# Patient Record
Sex: MALE | Race: WHITE | Employment: UNEMPLOYED | URBAN - METROPOLITAN AREA
[De-identification: names, ages, dates, MRNs, and addresses within clinical notes are randomized per-mention and may not be internally consistent; named-entity substitution may affect disease eponyms.]

---

## 2017-05-03 PROBLEM — R55 SYNCOPE, NEAR: Status: ACTIVE | Noted: 2017-05-03

## 2017-05-03 PROBLEM — F10.10 ALCOHOL ABUSE: Status: ACTIVE | Noted: 2017-05-03

## 2017-05-03 PROBLEM — N17.9 AKI (ACUTE KIDNEY INJURY) (HCC): Status: ACTIVE | Noted: 2017-05-03

## 2017-05-09 ENCOUNTER — TELEPHONE (OUTPATIENT)
Dept: FAMILY MEDICINE CLINIC | Age: 47
End: 2017-05-09

## 2017-05-10 ENCOUNTER — OFFICE VISIT (OUTPATIENT)
Dept: FAMILY MEDICINE CLINIC | Age: 47
End: 2017-05-10

## 2017-05-10 VITALS
SYSTOLIC BLOOD PRESSURE: 110 MMHG | HEART RATE: 100 BPM | WEIGHT: 182.2 LBS | DIASTOLIC BLOOD PRESSURE: 80 MMHG | BODY MASS INDEX: 23.38 KG/M2 | HEIGHT: 74 IN

## 2017-05-10 DIAGNOSIS — Z09 HOSPITAL DISCHARGE FOLLOW-UP: Primary | ICD-10-CM

## 2017-05-10 DIAGNOSIS — B35.1 ONYCHOMYCOSIS OF LEFT GREAT TOE: ICD-10-CM

## 2017-05-10 DIAGNOSIS — F41.9 ANXIETY: ICD-10-CM

## 2017-05-10 DIAGNOSIS — R55 NEAR SYNCOPE: ICD-10-CM

## 2017-05-10 DIAGNOSIS — R79.89 ELEVATED LFTS: ICD-10-CM

## 2017-05-10 DIAGNOSIS — N17.9 ACUTE KIDNEY INJURY (HCC): ICD-10-CM

## 2017-05-10 DIAGNOSIS — F10.10 ALCOHOL ABUSE: ICD-10-CM

## 2017-05-10 DIAGNOSIS — J44.9 CHRONIC OBSTRUCTIVE PULMONARY DISEASE, UNSPECIFIED COPD TYPE (HCC): ICD-10-CM

## 2017-05-10 DIAGNOSIS — I10 ESSENTIAL HYPERTENSION: ICD-10-CM

## 2017-05-10 PROCEDURE — 99214 OFFICE O/P EST MOD 30 MIN: CPT | Performed by: FAMILY MEDICINE

## 2017-05-10 RX ORDER — HYDROXYZINE PAMOATE 25 MG/1
25 CAPSULE ORAL 2 TIMES DAILY PRN
Qty: 60 CAPSULE | Refills: 5 | Status: SHIPPED | OUTPATIENT
Start: 2017-05-10 | End: 2017-10-06 | Stop reason: SDUPTHER

## 2017-05-10 RX ORDER — LISINOPRIL 10 MG/1
10 TABLET ORAL DAILY
Qty: 30 TABLET | Refills: 5 | Status: SHIPPED | OUTPATIENT
Start: 2017-05-10 | End: 2017-10-06 | Stop reason: SDUPTHER

## 2017-05-10 RX ORDER — PAROXETINE HYDROCHLORIDE 20 MG/1
20 TABLET, FILM COATED ORAL DAILY
Qty: 30 TABLET | Refills: 5 | Status: SHIPPED | OUTPATIENT
Start: 2017-05-10 | End: 2017-10-06 | Stop reason: SDUPTHER

## 2017-05-10 RX ORDER — FAMOTIDINE 40 MG/1
20 TABLET, FILM COATED ORAL 2 TIMES DAILY
Qty: 60 TABLET | Refills: 5 | Status: SHIPPED | OUTPATIENT
Start: 2017-05-10 | End: 2017-10-06 | Stop reason: SDUPTHER

## 2017-05-10 RX ORDER — NALTREXONE HYDROCHLORIDE 50 MG/1
50 TABLET, FILM COATED ORAL DAILY
Qty: 30 TABLET | Refills: 0 | Status: SHIPPED | OUTPATIENT
Start: 2017-05-10 | End: 2017-06-05 | Stop reason: SDUPTHER

## 2017-05-10 ASSESSMENT — PATIENT HEALTH QUESTIONNAIRE - PHQ9
SUM OF ALL RESPONSES TO PHQ9 QUESTIONS 1 & 2: 0
1. LITTLE INTEREST OR PLEASURE IN DOING THINGS: 0
SUM OF ALL RESPONSES TO PHQ QUESTIONS 1-9: 0
2. FEELING DOWN, DEPRESSED OR HOPELESS: 0

## 2017-06-05 ENCOUNTER — TELEPHONE (OUTPATIENT)
Dept: FAMILY MEDICINE CLINIC | Age: 47
End: 2017-06-05

## 2017-06-05 ENCOUNTER — OFFICE VISIT (OUTPATIENT)
Dept: FAMILY MEDICINE CLINIC | Age: 47
End: 2017-06-05

## 2017-06-05 VITALS
BODY MASS INDEX: 22.72 KG/M2 | HEIGHT: 74 IN | WEIGHT: 177 LBS | SYSTOLIC BLOOD PRESSURE: 136 MMHG | DIASTOLIC BLOOD PRESSURE: 80 MMHG | HEART RATE: 86 BPM

## 2017-06-05 DIAGNOSIS — F10.10 ALCOHOL ABUSE: ICD-10-CM

## 2017-06-05 PROCEDURE — 99213 OFFICE O/P EST LOW 20 MIN: CPT | Performed by: FAMILY MEDICINE

## 2017-06-05 RX ORDER — NALTREXONE HYDROCHLORIDE 50 MG/1
50 TABLET, FILM COATED ORAL DAILY
Qty: 30 TABLET | Refills: 1 | Status: SHIPPED | OUTPATIENT
Start: 2017-06-05 | End: 2017-10-06

## 2017-06-05 ASSESSMENT — ENCOUNTER SYMPTOMS
VOMITING: 1
NAUSEA: 1

## 2017-06-15 DIAGNOSIS — F41.9 ANXIETY: ICD-10-CM

## 2017-06-15 RX ORDER — TRAZODONE HYDROCHLORIDE 100 MG/1
100 TABLET ORAL NIGHTLY
Qty: 30 TABLET | Refills: 1 | Status: SHIPPED | OUTPATIENT
Start: 2017-06-15 | End: 2018-10-25

## 2017-10-06 ENCOUNTER — TELEPHONE (OUTPATIENT)
Dept: FAMILY MEDICINE CLINIC | Age: 47
End: 2017-10-06

## 2017-10-06 ENCOUNTER — OFFICE VISIT (OUTPATIENT)
Dept: FAMILY MEDICINE CLINIC | Age: 47
End: 2017-10-06

## 2017-10-06 VITALS
WEIGHT: 181.4 LBS | DIASTOLIC BLOOD PRESSURE: 90 MMHG | SYSTOLIC BLOOD PRESSURE: 130 MMHG | BODY MASS INDEX: 23.28 KG/M2 | HEIGHT: 74 IN | HEART RATE: 100 BPM | TEMPERATURE: 98.1 F

## 2017-10-06 DIAGNOSIS — F51.01 PRIMARY INSOMNIA: ICD-10-CM

## 2017-10-06 DIAGNOSIS — F41.9 ANXIETY: ICD-10-CM

## 2017-10-06 DIAGNOSIS — Z23 NEED FOR INFLUENZA VACCINATION: ICD-10-CM

## 2017-10-06 DIAGNOSIS — F10.10 ALCOHOL ABUSE: ICD-10-CM

## 2017-10-06 DIAGNOSIS — J44.9 CHRONIC OBSTRUCTIVE PULMONARY DISEASE, UNSPECIFIED COPD TYPE (HCC): ICD-10-CM

## 2017-10-06 DIAGNOSIS — I10 ESSENTIAL HYPERTENSION: Primary | ICD-10-CM

## 2017-10-06 PROCEDURE — 90471 IMMUNIZATION ADMIN: CPT | Performed by: FAMILY MEDICINE

## 2017-10-06 PROCEDURE — 99214 OFFICE O/P EST MOD 30 MIN: CPT | Performed by: FAMILY MEDICINE

## 2017-10-06 PROCEDURE — 90686 IIV4 VACC NO PRSV 0.5 ML IM: CPT | Performed by: FAMILY MEDICINE

## 2017-10-06 RX ORDER — HYDROXYZINE PAMOATE 25 MG/1
25 CAPSULE ORAL 2 TIMES DAILY PRN
Qty: 60 CAPSULE | Refills: 5 | Status: SHIPPED | OUTPATIENT
Start: 2017-10-06 | End: 2018-05-10 | Stop reason: SDUPTHER

## 2017-10-06 RX ORDER — FAMOTIDINE 40 MG/1
20 TABLET, FILM COATED ORAL 2 TIMES DAILY
Qty: 60 TABLET | Refills: 5 | Status: SHIPPED | OUTPATIENT
Start: 2017-10-06 | End: 2018-05-10 | Stop reason: SDUPTHER

## 2017-10-06 RX ORDER — PAROXETINE HYDROCHLORIDE 20 MG/1
20 TABLET, FILM COATED ORAL DAILY
Qty: 30 TABLET | Refills: 5 | Status: SHIPPED | OUTPATIENT
Start: 2017-10-06 | End: 2018-05-10 | Stop reason: SDUPTHER

## 2017-10-06 RX ORDER — LISINOPRIL 10 MG/1
10 TABLET ORAL DAILY
Qty: 30 TABLET | Refills: 5 | Status: SHIPPED | OUTPATIENT
Start: 2017-10-06 | End: 2018-05-10 | Stop reason: SDUPTHER

## 2017-10-06 RX ORDER — TRAZODONE HYDROCHLORIDE 50 MG/1
50 TABLET ORAL NIGHTLY
Qty: 30 TABLET | Refills: 5 | Status: SHIPPED | OUTPATIENT
Start: 2017-10-06 | End: 2018-05-10 | Stop reason: SDUPTHER

## 2017-10-06 RX ORDER — ALBUTEROL SULFATE 90 UG/1
2 AEROSOL, METERED RESPIRATORY (INHALATION) EVERY 6 HOURS PRN
Qty: 1 INHALER | Refills: 5 | Status: SHIPPED | OUTPATIENT
Start: 2017-10-06 | End: 2018-05-10 | Stop reason: SDUPTHER

## 2017-10-06 RX ORDER — NALTREXONE HYDROCHLORIDE 50 MG/1
50 TABLET, FILM COATED ORAL DAILY
Qty: 30 TABLET | Refills: 1 | Status: SHIPPED | OUTPATIENT
Start: 2017-10-06 | End: 2017-10-06 | Stop reason: CLARIF

## 2017-10-06 ASSESSMENT — ENCOUNTER SYMPTOMS: SHORTNESS OF BREATH: 1

## 2017-10-06 ASSESSMENT — COPD QUESTIONNAIRES: COPD: 1

## 2017-10-06 NOTE — MR AVS SNAPSHOT
After Visit Summary             Jose M Saldaña   10/6/2017 11:00 AM   Office Visit    Description:  Male : 1970   Provider:  Marycruz Man MD   Department[facility]  [facility]              Your Follow-Up and Future Appointments         Below is a list of your follow-up and future appointments. This may not be a complete list as you may have made appointments directly with providers that we are not aware of or your providers may have made some for you. Please call your providers to confirm appointments. It is important to keep your appointments. Please bring your current insurance card, photo ID, co-pay, and all medication bottles to your appointment. If self-pay, payment is expected at the time of service. Your To-Do List     Future Appointments Provider Department Dept Phone    3/29/2018 10:00 AM Marycruz Man MD [address] [place] [address] [phone]    Please arrive 15 minutes prior to appointment, bring photo ID and insurance card. Follow-Up    Return in about 25 weeks (around 3/30/2018) for Insomnia, Anxiety. Information from Your Visit        Department     Name Address Phone Fax    [address] [phone] [address] [place] [address] Were Seen for:         Comments    Essential hypertension   [[id number]]         Vital Signs     Blood Pressure Pulse Temperature Height Weight Body Mass Index    130/90 100 98.1 °F (36.7 °C) 6' 2\" (1.88 m) 181 lb 6.4 oz (82.3 kg) 23.29 kg/m2    Smoking Status                   Former Smoker              Today's Medication Changes          These changes are accurate as of: 10/6/17 11:37 AM.  If you have any questions, ask your nurse or doctor. CHANGE how you take these medications           * traZODone 100 MG tablet   Commonly known as:  DESYREL   Instructions: Take 100 mg by mouth nightly   Refills:  0   What changed:  Another medication with the same name was added.  Make sure you understand how and when to take each. Changed by:  Thamas Pallas, MD       * traZODone 100 MG tablet   Commonly known as:  DESYREL   Instructions: Take 1 tablet by mouth nightly   Quantity:  30 tablet   Refills:  1   What changed:  Another medication with the same name was added. Make sure you understand how and when to take each. Changed by:  Michelle Fried MD       * traZODone 50 MG tablet   Commonly known as:  DESYREL   Instructions: Take 1 tablet by mouth nightly   Quantity:  30 tablet   Refills:  5   What changed: You were already taking a medication with the same name, and this prescription was added. Make sure you understand how and when to take each. Changed by:  Michelle Fried MD       * Notice: This list has 3 medication(s) that are the same as other medications prescribed for you. Read the directions carefully, and ask your doctor or other care provider to review them with you.          Where to Get Your Medications      These medications were sent to [facility] [address] [place] [address]     Phone:  [phone]     albuterol sulfate  (90 Base) MCG/ACT inhaler    famotidine 40 MG tablet    hydrOXYzine 25 MG capsule    lisinopril 10 MG tablet    naltrexone 50 MG tablet    PARoxetine 20 MG tablet    tiotropium 18 MCG inhalation capsule    traZODone 50 MG tablet               Your Current Medications Are              naltrexone (DEPADE) 50 MG tablet Take 1 tablet by mouth daily    lisinopril (PRINIVIL;ZESTRIL) 10 MG tablet Take 1 tablet by mouth daily    tiotropium (SPIRIVA HANDIHALER) 18 MCG inhalation capsule Inhale 1 capsule into the lungs daily    famotidine (PEPCID) 40 MG tablet Take 0.5 tablets by mouth 2 times daily    PARoxetine (PAXIL) 20 MG tablet Take 1 tablet by mouth daily    albuterol sulfate HFA (PROVENTIL HFA) 108 (90 Base) MCG/ACT inhaler

## 2017-10-06 NOTE — TELEPHONE ENCOUNTER
Please let patient know that I sent naltrexone to his pharmacy on Friday. This was an error. I call the pharmacy and canceled it. However the naltrexone may show up on his after visit summary. Please be assured he is not taking this medication.

## 2017-10-06 NOTE — PROGRESS NOTES
Subjective:      Patient ID: Ajith Mercedes is a 50 y.o. male. Hypertension   This is a chronic problem. The current episode started 1 to 4 weeks ago. The problem has been waxing and waning since onset. The problem is uncontrolled (Stopped his lisinopril). Associated symptoms include shortness of breath. Past treatments include ACE inhibitors. The current treatment provides significant improvement. There are no compliance problems. COPD   He complains of shortness of breath. Primary symptoms comments: Has been taking the Spiriva only when he sweated of breath. Has been taking his puffer daily. . This is a chronic problem. The problem occurs every several days. The problem has been unchanged. Mental Health Problem   Primary symptoms comment: Anxiety doing okay. He did stop the Paxil, but realizes he should be taking. He promises to restart it. . This is a chronic problem. Insomnia: Takes the trazodone for this. The trazodone was making his mouth very dry at night. So he would take it sporadically. Now only takes it if he reallycannot have insomnia problems. Alcohol abuse: He has not had a drink in several weeks. Has stopped the naltrexone. Patient Active Problem List   Diagnosis    Panic attack    Anxiety    Abnormal LFTs    Steatohepatitis    Chronic obstructive pulmonary disease (HCC)    Cramps of lower extremity    Alcoholic hepatitis without ascites    Essential hypertension    Simple chronic bronchitis (HCC)    BETTY [facility]acute kidney injury) ([facility] [address]    Syncope, near    Alcohol abuse     Past Surgical History:   Procedure Laterality Date    FOOT SURGERY      right foot tendon release as a baby    TONSILLECTOMY           Review of Systems   Respiratory: Positive for shortness of breath. Cardiovascular: Negative. Endocrine: Positive for polydipsia (only when takes trazodone). Polyuria: only when takes trazodone. Psychiatric/Behavioral: Positive for sleep disturbance.  Negative for behavioral problems. Objective:   Physical Exam   Constitutional: He appears well-developed and well-nourished. HENT:   Head: Normocephalic and atraumatic. Cardiovascular: Normal rate, regular rhythm and normal heart sounds. Pulmonary/Chest: Effort normal and breath sounds normal.   Abdominal: Soft. He exhibits no mass. There is no tenderness. Psychiatric: He has a normal mood and affect.  His behavior is normal. Judgment and thought content normal.     Lab Review   Admission on 05/03/2017, Discharged on 05/05/2017   Component Date Value    Sodium 05/03/2017 136     Potassium 05/03/2017 4.0     Chloride 05/03/2017 88*    CO2 05/03/2017 23     BUN 05/03/2017 13     CREATININE 05/03/2017 1.5*    Glucose 05/03/2017 157*    Calcium 05/03/2017 8.8     Alb 05/03/2017 4.7     Total Protein 05/03/2017 7.3     Total Bilirubin 05/03/2017 1.6*    ALT 05/03/2017 106*    AST 05/03/2017 145*    Alkaline Phosphatase 05/03/2017 84     GFR Non- 05/03/2017 50*    GFR  05/03/2017 >60     Anion Gap 05/03/2017 25*    Color, UA 05/03/2017 DARK YELLOW*    Clarity, UA 05/03/2017 HAZY*    Glucose, Urine 05/03/2017 50*    Bilirubin Urine 05/03/2017 NEGATIVE     Ketones, Urine 05/03/2017 MODERATE*    Specific Gravity, UA 05/03/2017 1.016     Blood, Urine 05/03/2017 SMALL*    pH, Urine 05/03/2017 5.0     Protein, UA 05/03/2017 100*    Urobilinogen, Urine 05/03/2017 NORMAL     Nitrite Urine, Quantitat* 05/03/2017 NEGATIVE     Leukocyte Esterase, Urine 05/03/2017 NEGATIVE     RBC, UA 05/03/2017 1     WBC, UA 05/03/2017 1     Bacteria, UA 05/03/2017 RARE*    Mucus, UA 05/03/2017 MODERATE*    Hyaline Casts, UA 05/03/2017 >20     Granular Casts, UA 05/03/2017 9     Ventricular Rate 05/03/2017 85     Atrial Rate 05/03/2017 85     P-R Interval 05/03/2017 114     QRS Duration 05/03/2017 86     Q-T Interval 05/03/2017 396     QTc Calculation (Bazett) 05/03/2017 471     P Axis 05/03/2017 -15     R Axis 05/03/2017 72     T Axis 05/03/2017 78     Diagnosis 05/03/2017                      Value:Normal sinus rhythm  Normal ECG  No previous ECGs available  Confirmed by DAPHNEY SILVEIRA MD ([id number]) on 5/4/2017 9:23:08 AM      Lipase 05/03/2017 77*    Sodium, Ur 05/03/2017 45     Creatinine, Ur 05/03/2017 297.2     Osmolality, Ur 05/03/2017 470     Amylase 05/04/2017 76     WBC 05/04/2017 6.5     RBC 05/04/2017 4.12*    Hemoglobin 05/04/2017 13.1*    Hematocrit 05/04/2017 39.7*    MCV 05/04/2017 96.4     MCH 05/04/2017 31.8*    MCHC 05/04/2017 33.0     RDW 05/04/2017 14.5     Platelets 91/12/5023 218     MPV 05/04/2017 9.7     Differential Type 05/04/2017 AUTOMATED DIFFERENTIAL     Segs Relative 05/04/2017 71.0*    Lymphocytes % 05/04/2017 17.9*    Monocytes % 05/04/2017 8.4*    Eosinophils % 05/04/2017 1.4     Basophils % 05/04/2017 0.8     Segs Absolute 05/04/2017 4.7     Lymphocytes # 05/04/2017 1.2     Monocytes # 05/04/2017 0.6     Eosinophils # 05/04/2017 0.1     Basophils # 05/04/2017 0.1     Nucleated RBC % 05/04/2017 0.0     Total Nucleated RBC 05/04/2017 0.0     Total Immature Neutrophil 05/04/2017 0.03     Immature Neutrophil % 05/04/2017 0.5*    Sodium 05/04/2017 134*    Potassium 05/04/2017 4.4     Chloride 05/04/2017 94*    CO2 05/04/2017 26     BUN 05/04/2017 13     CREATININE 05/04/2017 0.9     Glucose 05/04/2017 121     Calcium 05/04/2017 8.5     Alb 05/04/2017 3.9     Total Protein 05/04/2017 5.6*    Total Bilirubin 05/04/2017 2.1*    ALT 05/04/2017 74*    AST 05/04/2017 103*    Alkaline Phosphatase 05/04/2017 81     GFR Non- 05/04/2017 >60     GFR  05/04/2017 >60     Anion Gap 05/04/2017 14     Magnesium 05/04/2017 1.5*    Phosphorus 05/04/2017 2.2*    TSH, High Sensitivity 05/04/2017 1.190        Assessment:      1.  Essential hypertension  lisinopril (PRINIVIL;ZESTRIL) 10 MG

## 2017-10-10 ENCOUNTER — TELEPHONE (OUTPATIENT)
Dept: FAMILY MEDICINE CLINIC | Age: 47
End: 2017-10-10

## 2017-10-10 NOTE — TELEPHONE ENCOUNTER
Received denial on Spiriva handihaler it's covered for patients under 23 year of age, called insurance and Spiriva Respimat is covered. Please send.

## 2018-05-10 ENCOUNTER — TELEPHONE (OUTPATIENT)
Dept: FAMILY MEDICINE CLINIC | Age: 48
End: 2018-05-10

## 2018-05-10 ENCOUNTER — OFFICE VISIT (OUTPATIENT)
Dept: FAMILY MEDICINE CLINIC | Age: 48
End: 2018-05-10

## 2018-05-10 VITALS
DIASTOLIC BLOOD PRESSURE: 100 MMHG | SYSTOLIC BLOOD PRESSURE: 150 MMHG | HEIGHT: 76 IN | BODY MASS INDEX: 20.38 KG/M2 | HEART RATE: 116 BPM | WEIGHT: 167.4 LBS

## 2018-05-10 DIAGNOSIS — S76.311A STRAIN OF RIGHT HAMSTRING: ICD-10-CM

## 2018-05-10 DIAGNOSIS — J44.9 CHRONIC OBSTRUCTIVE PULMONARY DISEASE, UNSPECIFIED COPD TYPE (HCC): ICD-10-CM

## 2018-05-10 DIAGNOSIS — I10 ESSENTIAL HYPERTENSION: Primary | ICD-10-CM

## 2018-05-10 DIAGNOSIS — F51.01 PRIMARY INSOMNIA: ICD-10-CM

## 2018-05-10 DIAGNOSIS — S86.111A GASTROCNEMIUS STRAIN, RIGHT, INITIAL ENCOUNTER: ICD-10-CM

## 2018-05-10 DIAGNOSIS — F41.9 ANXIETY: ICD-10-CM

## 2018-05-10 DIAGNOSIS — S89.91XD INJURY OF RIGHT KNEE, SUBSEQUENT ENCOUNTER: ICD-10-CM

## 2018-05-10 PROCEDURE — 99214 OFFICE O/P EST MOD 30 MIN: CPT | Performed by: FAMILY MEDICINE

## 2018-05-10 PROCEDURE — G8420 CALC BMI NORM PARAMETERS: HCPCS | Performed by: FAMILY MEDICINE

## 2018-05-10 PROCEDURE — 3023F SPIROM DOC REV: CPT | Performed by: FAMILY MEDICINE

## 2018-05-10 PROCEDURE — G8427 DOCREV CUR MEDS BY ELIG CLIN: HCPCS | Performed by: FAMILY MEDICINE

## 2018-05-10 PROCEDURE — 1036F TOBACCO NON-USER: CPT | Performed by: FAMILY MEDICINE

## 2018-05-10 PROCEDURE — G8926 SPIRO NO PERF OR DOC: HCPCS | Performed by: FAMILY MEDICINE

## 2018-05-10 RX ORDER — ALBUTEROL SULFATE 90 UG/1
2 AEROSOL, METERED RESPIRATORY (INHALATION) EVERY 6 HOURS PRN
Qty: 1 INHALER | Refills: 0 | Status: SHIPPED | OUTPATIENT
Start: 2018-05-10 | End: 2018-06-29 | Stop reason: SDUPTHER

## 2018-05-10 RX ORDER — TRAZODONE HYDROCHLORIDE 50 MG/1
50 TABLET ORAL NIGHTLY
Qty: 30 TABLET | Refills: 0 | Status: SHIPPED | OUTPATIENT
Start: 2018-05-10 | End: 2018-06-29

## 2018-05-10 RX ORDER — LISINOPRIL 10 MG/1
10 TABLET ORAL DAILY
Qty: 30 TABLET | Refills: 0 | Status: SHIPPED | OUTPATIENT
Start: 2018-05-10 | End: 2018-06-29

## 2018-05-10 RX ORDER — FAMOTIDINE 40 MG/1
20 TABLET, FILM COATED ORAL 2 TIMES DAILY
Qty: 60 TABLET | Refills: 0 | Status: SHIPPED | OUTPATIENT
Start: 2018-05-10 | End: 2018-06-29 | Stop reason: SDUPTHER

## 2018-05-10 RX ORDER — PAROXETINE HYDROCHLORIDE 20 MG/1
20 TABLET, FILM COATED ORAL DAILY
Qty: 30 TABLET | Refills: 0 | Status: SHIPPED | OUTPATIENT
Start: 2018-05-10 | End: 2018-06-29 | Stop reason: SDUPTHER

## 2018-05-10 RX ORDER — HYDROXYZINE PAMOATE 25 MG/1
25 CAPSULE ORAL 2 TIMES DAILY PRN
Qty: 60 CAPSULE | Refills: 0 | Status: SHIPPED | OUTPATIENT
Start: 2018-05-10 | End: 2018-06-29 | Stop reason: SDUPTHER

## 2018-05-10 ASSESSMENT — ENCOUNTER SYMPTOMS
RESPIRATORY NEGATIVE: 1
GASTROINTESTINAL NEGATIVE: 1

## 2018-06-21 ENCOUNTER — OFFICE VISIT (OUTPATIENT)
Dept: ORTHOPEDIC SURGERY | Age: 48
End: 2018-06-21

## 2018-06-21 VITALS — RESPIRATION RATE: 16 BRPM | WEIGHT: 167 LBS | BODY MASS INDEX: 20.76 KG/M2 | HEIGHT: 75 IN

## 2018-06-21 DIAGNOSIS — R52 PAIN: ICD-10-CM

## 2018-06-21 DIAGNOSIS — M54.10 RADICULOPATHY, UNSPECIFIED SPINAL REGION: ICD-10-CM

## 2018-06-21 DIAGNOSIS — S86.111A GASTROCNEMIUS STRAIN, RIGHT, INITIAL ENCOUNTER: Primary | ICD-10-CM

## 2018-06-21 PROCEDURE — G8427 DOCREV CUR MEDS BY ELIG CLIN: HCPCS | Performed by: ORTHOPAEDIC SURGERY

## 2018-06-21 PROCEDURE — 3017F COLORECTAL CA SCREEN DOC REV: CPT | Performed by: ORTHOPAEDIC SURGERY

## 2018-06-21 PROCEDURE — 99244 OFF/OP CNSLTJ NEW/EST MOD 40: CPT | Performed by: ORTHOPAEDIC SURGERY

## 2018-06-21 PROCEDURE — G8420 CALC BMI NORM PARAMETERS: HCPCS | Performed by: ORTHOPAEDIC SURGERY

## 2018-06-21 ASSESSMENT — ENCOUNTER SYMPTOMS
RESPIRATORY NEGATIVE: 1
GASTROINTESTINAL NEGATIVE: 1
EYES NEGATIVE: 1

## 2018-06-25 ENCOUNTER — TELEPHONE (OUTPATIENT)
Dept: FAMILY MEDICINE CLINIC | Age: 48
End: 2018-06-25

## 2018-06-25 NOTE — TELEPHONE ENCOUNTER
I tried to call patient, no answer. [facility]. Please see window for papers.

## 2018-06-28 NOTE — TELEPHONE ENCOUNTER
Patient is filling paroxetine and lisinopril at [facility] the non-adherence is from [facility].

## 2018-06-29 ENCOUNTER — OFFICE VISIT (OUTPATIENT)
Dept: FAMILY MEDICINE CLINIC | Age: 48
End: 2018-06-29

## 2018-06-29 VITALS
WEIGHT: 165 LBS | HEART RATE: 98 BPM | SYSTOLIC BLOOD PRESSURE: 162 MMHG | BODY MASS INDEX: 20.62 KG/M2 | DIASTOLIC BLOOD PRESSURE: 92 MMHG

## 2018-06-29 DIAGNOSIS — R20.2 BILATERAL NUMBNESS AND TINGLING OF ARMS AND LEGS: ICD-10-CM

## 2018-06-29 DIAGNOSIS — I10 ESSENTIAL HYPERTENSION: Primary | ICD-10-CM

## 2018-06-29 DIAGNOSIS — G89.29 CHRONIC PAIN OF RIGHT KNEE: ICD-10-CM

## 2018-06-29 DIAGNOSIS — R20.0 BILATERAL NUMBNESS AND TINGLING OF ARMS AND LEGS: ICD-10-CM

## 2018-06-29 DIAGNOSIS — K70.10 ALCOHOLIC HEPATITIS WITHOUT ASCITES: ICD-10-CM

## 2018-06-29 DIAGNOSIS — M25.561 CHRONIC PAIN OF RIGHT KNEE: ICD-10-CM

## 2018-06-29 DIAGNOSIS — J44.9 CHRONIC OBSTRUCTIVE PULMONARY DISEASE, UNSPECIFIED COPD TYPE (HCC): ICD-10-CM

## 2018-06-29 DIAGNOSIS — F41.9 ANXIETY: ICD-10-CM

## 2018-06-29 DIAGNOSIS — Z13.1 SCREENING FOR DIABETES MELLITUS: ICD-10-CM

## 2018-06-29 LAB
ANION GAP SERPL CALCULATED.3IONS-SCNC: 20 MMOL/L (ref 3–16)
BUN BLDV-MCNC: 13 MG/DL (ref 7–20)
CALCIUM SERPL-MCNC: 9.5 MG/DL (ref 8.3–10.6)
CHLORIDE BLD-SCNC: 95 MMOL/L (ref 99–110)
CHOLESTEROL, TOTAL: 201 MG/DL (ref 0–199)
CO2: 25 MMOL/L (ref 21–32)
CREAT SERPL-MCNC: 0.6 MG/DL (ref 0.9–1.3)
FOLATE: 11.48 NG/ML (ref 4.78–24.2)
GFR AFRICAN AMERICAN: >60
GFR NON-AFRICAN AMERICAN: >60
GLUCOSE BLD-MCNC: 104 MG/DL (ref 70–99)
HDLC SERPL-MCNC: 107 MG/DL (ref 40–60)
LDL CHOLESTEROL CALCULATED: 83 MG/DL
POTASSIUM SERPL-SCNC: 4.1 MMOL/L (ref 3.5–5.1)
SODIUM BLD-SCNC: 140 MMOL/L (ref 136–145)
TRIGL SERPL-MCNC: 53 MG/DL (ref 0–150)
VITAMIN B-12: 375 PG/ML (ref 211–911)
VLDLC SERPL CALC-MCNC: 11 MG/DL

## 2018-06-29 PROCEDURE — G8926 SPIRO NO PERF OR DOC: HCPCS | Performed by: FAMILY MEDICINE

## 2018-06-29 PROCEDURE — 1036F TOBACCO NON-USER: CPT | Performed by: FAMILY MEDICINE

## 2018-06-29 PROCEDURE — 3023F SPIROM DOC REV: CPT | Performed by: FAMILY MEDICINE

## 2018-06-29 PROCEDURE — 3017F COLORECTAL CA SCREEN DOC REV: CPT | Performed by: FAMILY MEDICINE

## 2018-06-29 PROCEDURE — 99214 OFFICE O/P EST MOD 30 MIN: CPT | Performed by: FAMILY MEDICINE

## 2018-06-29 PROCEDURE — G8420 CALC BMI NORM PARAMETERS: HCPCS | Performed by: FAMILY MEDICINE

## 2018-06-29 PROCEDURE — 36415 COLL VENOUS BLD VENIPUNCTURE: CPT | Performed by: FAMILY MEDICINE

## 2018-06-29 PROCEDURE — G8427 DOCREV CUR MEDS BY ELIG CLIN: HCPCS | Performed by: FAMILY MEDICINE

## 2018-06-29 RX ORDER — LISINOPRIL 30 MG/1
30 TABLET ORAL DAILY
Qty: 30 TABLET | Refills: 1 | Status: SHIPPED | OUTPATIENT
Start: 2018-06-29 | End: 2018-10-25 | Stop reason: SDUPTHER

## 2018-06-29 RX ORDER — PAROXETINE HYDROCHLORIDE 20 MG/1
20 TABLET, FILM COATED ORAL DAILY
Qty: 30 TABLET | Refills: 1 | Status: SHIPPED | OUTPATIENT
Start: 2018-06-29 | End: 2018-10-25 | Stop reason: SDUPTHER

## 2018-06-29 RX ORDER — LISINOPRIL 30 MG/1
30 TABLET ORAL DAILY
Qty: 30 TABLET | Refills: 0 | Status: SHIPPED | OUTPATIENT
Start: 2018-06-29 | End: 2018-06-29 | Stop reason: SDUPTHER

## 2018-06-29 RX ORDER — HYDROXYZINE PAMOATE 25 MG/1
25 CAPSULE ORAL 2 TIMES DAILY PRN
Qty: 60 CAPSULE | Refills: 1 | Status: SHIPPED | OUTPATIENT
Start: 2018-06-29 | End: 2018-10-25 | Stop reason: SDUPTHER

## 2018-06-29 RX ORDER — ALBUTEROL SULFATE 90 UG/1
2 AEROSOL, METERED RESPIRATORY (INHALATION) EVERY 6 HOURS PRN
Qty: 1 INHALER | Refills: 1 | Status: SHIPPED | OUTPATIENT
Start: 2018-06-29 | End: 2018-10-25 | Stop reason: SDUPTHER

## 2018-06-29 RX ORDER — FAMOTIDINE 40 MG/1
20 TABLET, FILM COATED ORAL 2 TIMES DAILY
Qty: 60 TABLET | Refills: 1 | Status: SHIPPED | OUTPATIENT
Start: 2018-06-29 | End: 2018-10-25 | Stop reason: SDUPTHER

## 2018-06-29 ASSESSMENT — PATIENT HEALTH QUESTIONNAIRE - PHQ9
SUM OF ALL RESPONSES TO PHQ QUESTIONS 1-9: 0
SUM OF ALL RESPONSES TO PHQ9 QUESTIONS 1 & 2: 0
1. LITTLE INTEREST OR PLEASURE IN DOING THINGS: 0
2. FEELING DOWN, DEPRESSED OR HOPELESS: 0

## 2018-06-29 ASSESSMENT — ENCOUNTER SYMPTOMS
COUGH: 1
SHORTNESS OF BREATH: 1

## 2018-06-29 ASSESSMENT — COPD QUESTIONNAIRES: COPD: 1

## 2018-06-30 LAB
ESTIMATED AVERAGE GLUCOSE: 93.9 MG/DL
HBA1C MFR BLD: 4.9 %

## 2018-07-16 ENCOUNTER — HOSPITAL ENCOUNTER (OUTPATIENT)
Dept: GENERAL RADIOLOGY | Age: 48
Discharge: HOME | End: 2018-07-16
Attending: FAMILY MEDICINE | Admitting: FAMILY MEDICINE

## 2018-07-16 DIAGNOSIS — R20.2 BILATERAL NUMBNESS AND TINGLING OF ARMS AND LEGS: ICD-10-CM

## 2018-07-16 DIAGNOSIS — R20.0 BILATERAL NUMBNESS AND TINGLING OF ARMS AND LEGS: ICD-10-CM

## 2018-10-10 ENCOUNTER — OFFICE VISIT (OUTPATIENT)
Dept: PHYSICAL MEDICINE AND REHAB | Age: 48
End: 2018-10-10
Payer: COMMERCIAL

## 2018-10-10 DIAGNOSIS — R20.2 PARESTHESIA AND PAIN OF BOTH UPPER EXTREMITIES: ICD-10-CM

## 2018-10-10 DIAGNOSIS — M79.602 PARESTHESIA AND PAIN OF BOTH UPPER EXTREMITIES: ICD-10-CM

## 2018-10-10 DIAGNOSIS — M54.17 LUMBOSACRAL RADICULOPATHY AT S1: Primary | ICD-10-CM

## 2018-10-10 DIAGNOSIS — M79.601 PARESTHESIA AND PAIN OF BOTH UPPER EXTREMITIES: ICD-10-CM

## 2018-10-10 DIAGNOSIS — G56.03 BILATERAL CARPAL TUNNEL SYNDROME: ICD-10-CM

## 2018-10-10 DIAGNOSIS — G25.81 RESTLESS LEG SYNDROME: ICD-10-CM

## 2018-10-10 PROCEDURE — 95886 MUSC TEST DONE W/N TEST COMP: CPT | Performed by: PHYSICAL MEDICINE & REHABILITATION

## 2018-10-10 PROCEDURE — 95912 NRV CNDJ TEST 11-12 STUDIES: CPT | Performed by: PHYSICAL MEDICINE & REHABILITATION

## 2018-10-11 ENCOUNTER — TELEPHONE (OUTPATIENT)
Dept: FAMILY MEDICINE CLINIC | Age: 48
End: 2018-10-11

## 2018-10-11 NOTE — TELEPHONE ENCOUNTER
It shows some nerve damage in the lower back nerve root. This would explain the pain in his leg. Will review this further and do another exam at the time of his appt.

## 2018-10-25 ENCOUNTER — OFFICE VISIT (OUTPATIENT)
Dept: FAMILY MEDICINE CLINIC | Age: 48
End: 2018-10-25
Payer: COMMERCIAL

## 2018-10-25 VITALS
BODY MASS INDEX: 21.01 KG/M2 | DIASTOLIC BLOOD PRESSURE: 100 MMHG | SYSTOLIC BLOOD PRESSURE: 140 MMHG | HEART RATE: 133 BPM | WEIGHT: 169 LBS | HEIGHT: 75 IN

## 2018-10-25 DIAGNOSIS — Z23 NEED FOR INFLUENZA VACCINATION: ICD-10-CM

## 2018-10-25 DIAGNOSIS — J44.9 CHRONIC OBSTRUCTIVE PULMONARY DISEASE, UNSPECIFIED COPD TYPE (HCC): ICD-10-CM

## 2018-10-25 DIAGNOSIS — I10 ESSENTIAL HYPERTENSION: Primary | ICD-10-CM

## 2018-10-25 DIAGNOSIS — F41.9 ANXIETY: ICD-10-CM

## 2018-10-25 DIAGNOSIS — R00.0 SINUS TACHYCARDIA: ICD-10-CM

## 2018-10-25 DIAGNOSIS — M54.17 LUMBOSACRAL RADICULOPATHY AT S1: ICD-10-CM

## 2018-10-25 PROBLEM — R55 SYNCOPE, NEAR: Status: RESOLVED | Noted: 2017-05-03 | Resolved: 2018-10-25

## 2018-10-25 PROCEDURE — 99214 OFFICE O/P EST MOD 30 MIN: CPT | Performed by: FAMILY MEDICINE

## 2018-10-25 PROCEDURE — G8482 FLU IMMUNIZE ORDER/ADMIN: HCPCS | Performed by: FAMILY MEDICINE

## 2018-10-25 PROCEDURE — 3017F COLORECTAL CA SCREEN DOC REV: CPT | Performed by: FAMILY MEDICINE

## 2018-10-25 PROCEDURE — G8427 DOCREV CUR MEDS BY ELIG CLIN: HCPCS | Performed by: FAMILY MEDICINE

## 2018-10-25 PROCEDURE — G8926 SPIRO NO PERF OR DOC: HCPCS | Performed by: FAMILY MEDICINE

## 2018-10-25 PROCEDURE — 90686 IIV4 VACC NO PRSV 0.5 ML IM: CPT | Performed by: FAMILY MEDICINE

## 2018-10-25 PROCEDURE — 3023F SPIROM DOC REV: CPT | Performed by: FAMILY MEDICINE

## 2018-10-25 PROCEDURE — G8420 CALC BMI NORM PARAMETERS: HCPCS | Performed by: FAMILY MEDICINE

## 2018-10-25 PROCEDURE — 90471 IMMUNIZATION ADMIN: CPT | Performed by: FAMILY MEDICINE

## 2018-10-25 PROCEDURE — 4004F PT TOBACCO SCREEN RCVD TLK: CPT | Performed by: FAMILY MEDICINE

## 2018-10-25 RX ORDER — PAROXETINE HYDROCHLORIDE 20 MG/1
20 TABLET, FILM COATED ORAL DAILY
Qty: 30 TABLET | Refills: 0 | Status: SHIPPED | OUTPATIENT
Start: 2018-10-25 | End: 2019-05-22 | Stop reason: SDUPTHER

## 2018-10-25 RX ORDER — TRAZODONE HYDROCHLORIDE 50 MG/1
1 TABLET ORAL NIGHTLY
Refills: 5 | COMMUNITY
Start: 2018-09-17 | End: 2018-10-25

## 2018-10-25 RX ORDER — TRAZODONE HYDROCHLORIDE 100 MG/1
100 TABLET ORAL NIGHTLY
Qty: 30 TABLET | Refills: 0 | Status: SHIPPED | OUTPATIENT
Start: 2018-10-25 | End: 2019-06-25

## 2018-10-25 RX ORDER — ALBUTEROL SULFATE 90 UG/1
2 AEROSOL, METERED RESPIRATORY (INHALATION) EVERY 6 HOURS PRN
Qty: 1 INHALER | Refills: 0 | Status: SHIPPED | OUTPATIENT
Start: 2018-10-25 | End: 2019-05-22 | Stop reason: SDUPTHER

## 2018-10-25 RX ORDER — LISINOPRIL 30 MG/1
30 TABLET ORAL DAILY
Qty: 30 TABLET | Refills: 0 | Status: SHIPPED | OUTPATIENT
Start: 2018-10-25 | End: 2019-05-22 | Stop reason: SDUPTHER

## 2018-10-25 RX ORDER — FAMOTIDINE 40 MG/1
20 TABLET, FILM COATED ORAL 2 TIMES DAILY
Qty: 60 TABLET | Refills: 0 | Status: SHIPPED | OUTPATIENT
Start: 2018-10-25 | End: 2019-05-22 | Stop reason: SDUPTHER

## 2018-10-25 RX ORDER — HYDROXYZINE PAMOATE 25 MG/1
25 CAPSULE ORAL 2 TIMES DAILY PRN
Qty: 60 CAPSULE | Refills: 0 | Status: SHIPPED | OUTPATIENT
Start: 2018-10-25 | End: 2019-05-22 | Stop reason: SDUPTHER

## 2018-10-25 RX ORDER — GABAPENTIN 100 MG/1
100 CAPSULE ORAL NIGHTLY
Qty: 30 CAPSULE | Refills: 0 | Status: SHIPPED | OUTPATIENT
Start: 2018-10-25 | End: 2019-05-22 | Stop reason: SDUPTHER

## 2018-10-26 ENCOUNTER — TELEPHONE (OUTPATIENT)
Dept: FAMILY MEDICINE CLINIC | Age: 48
End: 2018-10-26

## 2018-10-26 ASSESSMENT — ENCOUNTER SYMPTOMS
COUGH: 1
SHORTNESS OF BREATH: 1
CHEST TIGHTNESS: 0

## 2018-10-26 ASSESSMENT — COPD QUESTIONNAIRES: COPD: 1

## 2018-11-07 ENCOUNTER — TELEPHONE (OUTPATIENT)
Dept: FAMILY MEDICINE CLINIC | Age: 48
End: 2018-11-07

## 2019-02-25 ENCOUNTER — HOSPITAL ENCOUNTER (EMERGENCY)
Age: 49
Discharge: HOME | End: 2019-02-25
Attending: EMERGENCY MEDICINE
Payer: COMMERCIAL

## 2019-02-25 VITALS
WEIGHT: 169 LBS | RESPIRATION RATE: 12 BRPM | OXYGEN SATURATION: 95 % | DIASTOLIC BLOOD PRESSURE: 76 MMHG | SYSTOLIC BLOOD PRESSURE: 107 MMHG | BODY MASS INDEX: 21.01 KG/M2 | HEART RATE: 94 BPM | HEIGHT: 75 IN | TEMPERATURE: 98 F

## 2019-02-25 DIAGNOSIS — F10.920 ACUTE ALCOHOLIC INTOXICATION WITHOUT COMPLICATION (HCC): Primary | ICD-10-CM

## 2019-02-25 PROCEDURE — 99283 EMERGENCY DEPT VISIT LOW MDM: CPT

## 2019-05-22 ENCOUNTER — OFFICE VISIT (OUTPATIENT)
Dept: FAMILY MEDICINE CLINIC | Age: 49
End: 2019-05-22
Payer: COMMERCIAL

## 2019-05-22 VITALS
WEIGHT: 170.4 LBS | DIASTOLIC BLOOD PRESSURE: 70 MMHG | HEIGHT: 75 IN | SYSTOLIC BLOOD PRESSURE: 110 MMHG | BODY MASS INDEX: 21.19 KG/M2 | HEART RATE: 82 BPM

## 2019-05-22 DIAGNOSIS — M54.17 LUMBOSACRAL RADICULOPATHY AT S1: Primary | ICD-10-CM

## 2019-05-22 DIAGNOSIS — I10 ESSENTIAL HYPERTENSION: ICD-10-CM

## 2019-05-22 DIAGNOSIS — J44.9 CHRONIC OBSTRUCTIVE PULMONARY DISEASE, UNSPECIFIED COPD TYPE (HCC): ICD-10-CM

## 2019-05-22 DIAGNOSIS — F41.9 ANXIETY: ICD-10-CM

## 2019-05-22 PROCEDURE — 1036F TOBACCO NON-USER: CPT | Performed by: FAMILY MEDICINE

## 2019-05-22 PROCEDURE — 3017F COLORECTAL CA SCREEN DOC REV: CPT | Performed by: FAMILY MEDICINE

## 2019-05-22 PROCEDURE — G8926 SPIRO NO PERF OR DOC: HCPCS | Performed by: FAMILY MEDICINE

## 2019-05-22 PROCEDURE — 3023F SPIROM DOC REV: CPT | Performed by: FAMILY MEDICINE

## 2019-05-22 PROCEDURE — G8428 CUR MEDS NOT DOCUMENT: HCPCS | Performed by: FAMILY MEDICINE

## 2019-05-22 PROCEDURE — 36415 COLL VENOUS BLD VENIPUNCTURE: CPT | Performed by: FAMILY MEDICINE

## 2019-05-22 PROCEDURE — G8420 CALC BMI NORM PARAMETERS: HCPCS | Performed by: FAMILY MEDICINE

## 2019-05-22 PROCEDURE — 99214 OFFICE O/P EST MOD 30 MIN: CPT | Performed by: FAMILY MEDICINE

## 2019-05-22 RX ORDER — FAMOTIDINE 40 MG/1
20 TABLET, FILM COATED ORAL 2 TIMES DAILY
Qty: 60 TABLET | Refills: 5 | Status: SHIPPED | OUTPATIENT
Start: 2019-05-22 | End: 2019-12-31 | Stop reason: SDUPTHER

## 2019-05-22 RX ORDER — GABAPENTIN 100 MG/1
100 CAPSULE ORAL NIGHTLY
Qty: 30 CAPSULE | Refills: 5 | Status: SHIPPED | OUTPATIENT
Start: 2019-05-22 | End: 2019-06-25 | Stop reason: DRUGHIGH

## 2019-05-22 RX ORDER — LISINOPRIL 30 MG/1
30 TABLET ORAL DAILY
Qty: 30 TABLET | Refills: 5 | Status: SHIPPED | OUTPATIENT
Start: 2019-05-22 | End: 2019-05-22

## 2019-05-22 RX ORDER — ALBUTEROL SULFATE 90 UG/1
2 AEROSOL, METERED RESPIRATORY (INHALATION) EVERY 6 HOURS PRN
Qty: 1 INHALER | Refills: 5 | Status: SHIPPED | OUTPATIENT
Start: 2019-05-22 | End: 2019-12-31 | Stop reason: SDUPTHER

## 2019-05-22 RX ORDER — HYDROXYZINE PAMOATE 25 MG/1
25 CAPSULE ORAL 2 TIMES DAILY PRN
Qty: 60 CAPSULE | Refills: 5 | Status: SHIPPED | OUTPATIENT
Start: 2019-05-22 | End: 2019-06-19 | Stop reason: RX

## 2019-05-22 RX ORDER — ALBUTEROL SULFATE 90 UG/1
2 AEROSOL, METERED RESPIRATORY (INHALATION) EVERY 6 HOURS PRN
Qty: 1 INHALER | Refills: 0 | Status: SHIPPED | OUTPATIENT
Start: 2019-05-22 | End: 2019-05-22 | Stop reason: SDUPTHER

## 2019-05-22 RX ORDER — PAROXETINE HYDROCHLORIDE 20 MG/1
20 TABLET, FILM COATED ORAL DAILY
Qty: 30 TABLET | Refills: 5 | Status: SHIPPED | OUTPATIENT
Start: 2019-05-22 | End: 2019-12-31 | Stop reason: SDUPTHER

## 2019-05-22 ASSESSMENT — PATIENT HEALTH QUESTIONNAIRE - PHQ9
SUM OF ALL RESPONSES TO PHQ QUESTIONS 1-9: 0
1. LITTLE INTEREST OR PLEASURE IN DOING THINGS: 0
2. FEELING DOWN, DEPRESSED OR HOPELESS: 0
SUM OF ALL RESPONSES TO PHQ QUESTIONS 1-9: 0
SUM OF ALL RESPONSES TO PHQ9 QUESTIONS 1 & 2: 0

## 2019-05-22 NOTE — PROGRESS NOTES
early 2012    History of idiopathic seizure 2012    Hypertension     Panic attack        Past Surgical History:   Procedure Laterality Date    FOOT SURGERY      right foot tendon release as a baby    TONSILLECTOMY         Family History   Problem Relation Age of Onset    High Blood Pressure Mother     Other Mother         MS    COPD Mother     COPD Father     Alcohol Abuse Father          47    Tuberculosis Maternal Grandfather        Current Outpatient Medications on File Prior to Visit   Medication Sig Dispense Refill    traZODone (DESYREL) 100 MG tablet Take 1 tablet by mouth nightly 30 tablet 0    naproxen (NAPROSYN) 500 MG tablet Take 1 tablet by mouth 2 times daily as needed for Pain 20 tablet 0    ondansetron (ZOFRAN ODT) 4 MG disintegrating tablet Take 1 tablet by mouth every 6 hours 10 tablet 0    lidocaine (LIDODERM) 5 % Place 1 patch onto the skin daily 12 hours on, 12 hours off. 3 patch 0     No current facility-administered medications on file prior to visit. Objective:     Physical Exam   Constitutional: He appears well-developed and well-nourished. Obese   HENT:   Head: Normocephalic and atraumatic. Cardiovascular: Normal rate, regular rhythm, S1 normal, S2 normal and normal heart sounds. Pulmonary/Chest: Breath sounds normal. No respiratory distress. He has no wheezes. He has no rales. Neurological: He is alert. Skin: Skin is warm, dry and intact. Psychiatric: He has a normal mood and affect. His speech is normal and behavior is normal. Judgment and thought content normal. Cognition and memory are normal.   Nursing note and vitals reviewed. Vitals:    19 1508   BP: 110/70   Site: Left Upper Arm   Position: Sitting   Cuff Size: Medium Adult   Pulse: 82   Weight: 170 lb 6.4 oz (77.3 kg)   Height: 6' 3\" (1.905 m)     Body mass index is 21.3 kg/m².      Wt Readings from Last 3 Encounters:   19 170 lb 6.4 oz (77.3 kg)   19 169 lb (76.7 kg)   10/25/18 169 lb (76.7 kg)     BP Readings from Last 3 Encounters:   05/22/19 110/70   02/25/19 107/76   10/25/18 (!) 140/100      FINDINGS:      EMG of the lumbar and cervical paraspinals and all 4 limbs demonstrated lumbar paraspinal muscle membrane irritability bilaterally. Mild muscle membrane irritability with positive waves and fibrillations were seen in the left more than right S1 myotome muscles. A diminished number of larger, polyphasic motor units were seen in those irritable muscles. Motor unit configuration and recruitment were well preserved in the upper limbs. Lower limb sensory and motor latencies, evoked amplitudes and conduction velocities were normal. There was minor median sensory latency delays across each wrist while SNAP amplitudes were well preserved. Ulnar conductions were normal.        IMPRESSION:                  1. Abnormal EMG. The most significant finding is that of bilateral S1 spinal nerve root injuries (left more than right S1 radiculopathies). I believe this would explain most of his lower limb symptoms.                 2. Negligible median neuropathies at each wrist (electrically very mild carpal tunnel syndrome bilaterally).                3. No evidence of a concurrent plexopathy, generalized neuropathy or primary muscle disease.         Results for orders placed or performed in visit on 46/40/79   Basic Metabolic Panel   Result Value Ref Range    Sodium 140 136 - 145 mmol/L    Potassium 4.9 3.5 - 5.1 mmol/L    Chloride 98 (L) 99 - 110 mmol/L    CO2 28 21 - 32 mmol/L    Anion Gap 14 3 - 16    Glucose 136 (H) 70 - 99 mg/dL    BUN 26 (H) 7 - 20 mg/dL    CREATININE 0.7 (L) 0.9 - 1.3 mg/dL    GFR Non-African American >60 >60    GFR African American >60 >60    Calcium 10.0 8.3 - 10.6 mg/dL           Assessment:       Diagnosis Orders   1.  Lumbosacral radiculopathy at S1  MRI LUMBAR SPINE WO CONTRAST    gabapentin (NEURONTIN) 100 MG capsule    Amb External Referral To Neurosurgery   2. Anxiety  hydrOXYzine (VISTARIL) 25 MG capsule    PARoxetine (PAXIL) 20 MG tablet   3. Chronic obstructive pulmonary disease, unspecified COPD type (HCC)  tiotropium (SPIRIVA HANDIHALER) 18 MCG inhalation capsule    albuterol sulfate HFA (PROVENTIL HFA) 108 (90 Base) MCG/ACT inhaler   4. Essential hypertension  Basic Metabolic Panel    lisinopril (ZESTRIL) 30 MG tablet    DISCONTINUED: lisinopril (ZESTRIL) 30 MG tablet           Plan:    HTN stable. Continue current medication      See orders    Anxiety stable. RF all meds except Trazadone since sleeping OK and may no longer need it and has been without his medications anyway. COPD very well controlled since he has stopped smoking.

## 2019-05-23 LAB
ANION GAP SERPL CALCULATED.3IONS-SCNC: 14 MMOL/L (ref 3–16)
BUN BLDV-MCNC: 26 MG/DL (ref 7–20)
CALCIUM SERPL-MCNC: 10 MG/DL (ref 8.3–10.6)
CHLORIDE BLD-SCNC: 98 MMOL/L (ref 99–110)
CO2: 28 MMOL/L (ref 21–32)
CREAT SERPL-MCNC: 0.7 MG/DL (ref 0.9–1.3)
GFR AFRICAN AMERICAN: >60
GFR NON-AFRICAN AMERICAN: >60
GLUCOSE BLD-MCNC: 136 MG/DL (ref 70–99)
POTASSIUM SERPL-SCNC: 4.9 MMOL/L (ref 3.5–5.1)
SODIUM BLD-SCNC: 140 MMOL/L (ref 136–145)

## 2019-05-23 RX ORDER — LISINOPRIL 30 MG/1
30 TABLET ORAL DAILY
Qty: 30 TABLET | Refills: 5 | Status: SHIPPED | OUTPATIENT
Start: 2019-05-23 | End: 2019-12-31 | Stop reason: SDUPTHER

## 2019-05-23 ASSESSMENT — ENCOUNTER SYMPTOMS
CONSTIPATION: 0
BACK PAIN: 0
COUGH: 1
SHORTNESS OF BREATH: 1
CHEST TIGHTNESS: 0

## 2019-05-23 ASSESSMENT — COPD QUESTIONNAIRES: COPD: 1

## 2019-06-04 ENCOUNTER — HOSPITAL ENCOUNTER (OUTPATIENT)
Dept: MRI IMAGING | Age: 49
Discharge: HOME | End: 2019-06-04
Payer: COMMERCIAL

## 2019-06-04 DIAGNOSIS — M54.17 LUMBOSACRAL RADICULOPATHY AT S1: ICD-10-CM

## 2019-06-04 PROCEDURE — 72148 MRI LUMBAR SPINE W/O DYE: CPT

## 2019-06-18 ENCOUNTER — TELEPHONE (OUTPATIENT)
Dept: FAMILY MEDICINE CLINIC | Age: 49
End: 2019-06-18

## 2019-06-19 ENCOUNTER — TELEPHONE (OUTPATIENT)
Dept: FAMILY MEDICINE CLINIC | Age: 49
End: 2019-06-19

## 2019-06-19 DIAGNOSIS — F41.9 ANXIETY: Primary | ICD-10-CM

## 2019-06-19 DIAGNOSIS — M54.17 LUMBOSACRAL RADICULOPATHY AT S1: Primary | ICD-10-CM

## 2019-06-19 RX ORDER — HYDROXYZINE HYDROCHLORIDE 25 MG/1
25 TABLET, FILM COATED ORAL 2 TIMES DAILY
Qty: 60 TABLET | Refills: 4 | Status: SHIPPED | OUTPATIENT
Start: 2019-06-19 | End: 2019-12-31 | Stop reason: SDUPTHER

## 2019-06-19 NOTE — TELEPHONE ENCOUNTER
Patient called stating did not get vistaril from pharmacy. I called [facility] and they cannot get it. There is a nationwide back order. Is there anything else that can be sent?

## 2019-06-19 NOTE — TELEPHONE ENCOUNTER
I referred him almost a month ago to Dr. Maximino Gilford who can do a lot of the injections that the pain specialists do. This is why I chose Dr. Anali Quintanilla for him.

## 2019-06-19 NOTE — TELEPHONE ENCOUNTER
Are both the capsule and the tablet unavailable? If not, can the one that is available to be utilized?

## 2019-06-25 ENCOUNTER — OFFICE VISIT (OUTPATIENT)
Dept: FAMILY MEDICINE CLINIC | Age: 49
End: 2019-06-25
Payer: COMMERCIAL

## 2019-06-25 VITALS
SYSTOLIC BLOOD PRESSURE: 110 MMHG | HEIGHT: 75 IN | DIASTOLIC BLOOD PRESSURE: 74 MMHG | HEART RATE: 96 BPM | WEIGHT: 175 LBS | BODY MASS INDEX: 21.76 KG/M2

## 2019-06-25 DIAGNOSIS — M54.17 LUMBOSACRAL RADICULOPATHY AT S1: Primary | ICD-10-CM

## 2019-06-25 PROCEDURE — G8428 CUR MEDS NOT DOCUMENT: HCPCS | Performed by: FAMILY MEDICINE

## 2019-06-25 PROCEDURE — 1036F TOBACCO NON-USER: CPT | Performed by: FAMILY MEDICINE

## 2019-06-25 PROCEDURE — G8420 CALC BMI NORM PARAMETERS: HCPCS | Performed by: FAMILY MEDICINE

## 2019-06-25 PROCEDURE — 99213 OFFICE O/P EST LOW 20 MIN: CPT | Performed by: FAMILY MEDICINE

## 2019-06-25 PROCEDURE — 3017F COLORECTAL CA SCREEN DOC REV: CPT | Performed by: FAMILY MEDICINE

## 2019-06-25 RX ORDER — GABAPENTIN 100 MG/1
100 CAPSULE ORAL EVERY EVENING
COMMUNITY
End: 2019-06-25 | Stop reason: ALTCHOICE

## 2019-06-25 RX ORDER — GABAPENTIN 300 MG/1
300 CAPSULE ORAL 2 TIMES DAILY
Qty: 60 CAPSULE | Refills: 2 | Status: SHIPPED | OUTPATIENT
Start: 2019-06-25 | End: 2019-09-19 | Stop reason: SDUPTHER

## 2019-06-25 ASSESSMENT — ENCOUNTER SYMPTOMS: BACK PAIN: 0

## 2019-06-25 NOTE — PROGRESS NOTES
Patient ID: Perla Muñoz 1970    Chief Complaint   Patient presents with    Leg Pain     right leg         HPI   Right leg pain:   Severe. Can't tolerate it anymore. No back pain. Leg gets weak. Gabapentin helped slightly. Could not go to the neurosurgeon recommended due to insurance. Review of Systems   Musculoskeletal: Positive for gait problem. Negative for back pain. Psychiatric/Behavioral: Positive for sleep disturbance.        Patient Active Problem List   Diagnosis    Panic attack    Anxiety    Abnormal LFTs    Steatohepatitis    Chronic obstructive pulmonary disease (HCC)    Cramps of lower extremity    Alcoholic hepatitis without ascites    Essential hypertension    Simple chronic bronchitis (HCC)    BETTY [facility]) ([facility] [address]    Alcohol abuse    Lumbosacral radiculopathy at S1       Past Medical History:   Diagnosis Date    COPD (chronic obstructive pulmonary disease) ([facility] [address] 2013    History of alcohol abuse     Quit early 2012    History of idiopathic seizure 2012    Hypertension     Panic attack        Past Surgical History:   Procedure Laterality Date    FOOT SURGERY      right foot tendon release as a baby    TONSILLECTOMY         Family History   Problem Relation Age of Onset    High Blood Pressure Mother     Other Mother         MS    COPD Mother     COPD Father     Alcohol Abuse Father          47    Tuberculosis Maternal Grandfather        Current Outpatient Medications on File Prior to Visit   Medication Sig Dispense Refill    hydrOXYzine (ATARAX) 25 MG tablet Take 1 tablet by mouth 2 times daily 60 tablet 4    lisinopril (ZESTRIL) 30 MG tablet Take 1 tablet by mouth daily 30 tablet 5    tiotropium (SPIRIVA HANDIHALER) 18 MCG inhalation capsule Inhale 1 capsule into the lungs daily 30 capsule 5    PARoxetine (PAXIL) 20 MG tablet Take 1 tablet by mouth daily 30 tablet 5    famotidine (PEPCID) 40 MG tablet Take 0.5 tablets by mouth 2

## 2019-06-25 NOTE — PATIENT INSTRUCTIONS
Walking for Exercise: Care Instructions  Your Care Instructions    Walking is one of the easiest ways to get the exercise you need for good health. A brisk, 30-minute walk each day can help you feel better and have more energy. It can help you lower your risk of disease. Walking can help you keep your bones strong and your heart healthy. Check with your doctor before you start a walking plan if you have heart problems, other health issues, or you have not been active in a long time. Follow your doctor's instructions for safe levels of exercise. Follow-up care is a key part of your treatment and safety. Be sure to make and go to all appointments, and call your doctor if you are having problems. It's also a good idea to know your test results and keep a list of the medicines you take. How can you care for yourself at home? Getting started  · Start slowly and set a short-term goal. For example, walk for 5 or 10 minutes every day. · Bit by bit, increase the amount you walk every day. Try for at least 30 minutes on most days of the week. You also may want to swim, bike, or do other activities. · If finding enough time is a problem, it is fine to be active in blocks of 10 minutes or more throughout your day and week. · To get the heart-healthy benefits of walking, you need to walk briskly enough to increase your heart rate and breathing, but not so fast that you cannot talk comfortably. · Wear comfortable shoes that fit well and provide good support for your feet and ankles. Staying with your plan  · After you've made walking a habit, set a longer-term goal. You may want to set a goal of walking briskly for longer or walking farther. Experts say to do 2½ hours of moderate activity a week. A faster heartbeat is what defines moderate-level activity. · To stay motivated, walk with friends, coworkers, or pets. · Use a phone lorena or pedometer to track your steps each day. Set a goal to increase your steps.  Once you get there, set a higher goal. Aim for 10,000 steps a day. · If the weather keeps you from walking outside, go for walks at the mall with a friend. Local schools and churches may have indoor gyms where you can walk. Fitting a walk into your workday  · Park several blocks away from work, or get off the bus a few stops early. · Use the stairs instead of the elevator, at least for a few floors. · Suggest holding meetings with colleagues during a walk inside or outside the building. · Use the restroom that is the farthest from your desk or workstation. · Use your morning and afternoon breaks to take quick 15-minute walks. Staying safe  · Know your surroundings. Walk in a well-lighted, safe place. If it is dark, walk with a partner. Wear light-colored clothing. If you can, buy a vest or jacket that reflects light. · Carry a cell phone for emergencies. · Drink plenty of water. Take a water bottle with you when you walk. This is very important if it is hot out. · Be careful not to slip on wet or icy ground. You can buy \"grippers\" for your shoes to help keep you from slipping. · Pay attention to your walking surface. Use sidewalks and paths. · If you have breathing problems like asthma or COPD, ask your doctor when it is safe for you to walk outdoors. Cold, dry air, smog, pollen, or other things in the air could cause breathing problems. Where can you learn more? Go to https://[employer]. org and sign in to your [employer] account. Enter R159 in the [employer] box to learn more about \"Walking for Exercise: Care Instructions. \"     If you do not have an account, please click on the \"Sign Up Now\" link. Current as of: August 19, 2018  Content Version: 12.0  © 6795-7799 Healthwise, Incorporated. Care instructions adapted under license by [facility] ([facility]).  If you have questions about a medical condition or this instruction, always ask your healthcare professional. Belinda Davies disclaims any warranty or liability for your use of this information.

## 2019-09-18 ENCOUNTER — TELEPHONE (OUTPATIENT)
Dept: FAMILY MEDICINE CLINIC | Age: 49
End: 2019-09-18

## 2019-09-18 DIAGNOSIS — M54.17 LUMBOSACRAL RADICULOPATHY AT S1: ICD-10-CM

## 2019-09-19 DIAGNOSIS — M54.17 LUMBOSACRAL RADICULOPATHY AT S1: ICD-10-CM

## 2019-09-19 RX ORDER — GABAPENTIN 300 MG/1
300 CAPSULE ORAL 2 TIMES DAILY
Qty: 60 CAPSULE | Refills: 1 | Status: SHIPPED | OUTPATIENT
Start: 2019-09-19 | End: 2019-12-31 | Stop reason: SDUPTHER

## 2019-09-20 RX ORDER — GABAPENTIN 300 MG/1
CAPSULE ORAL
Qty: 60 CAPSULE | Refills: 2 | OUTPATIENT
Start: 2019-09-20

## 2019-12-31 ENCOUNTER — OFFICE VISIT (OUTPATIENT)
Dept: FAMILY MEDICINE CLINIC | Age: 49
End: 2019-12-31
Payer: COMMERCIAL

## 2019-12-31 ENCOUNTER — TELEPHONE (OUTPATIENT)
Dept: FAMILY MEDICINE CLINIC | Age: 49
End: 2019-12-31

## 2019-12-31 VITALS
BODY MASS INDEX: 23.38 KG/M2 | SYSTOLIC BLOOD PRESSURE: 120 MMHG | DIASTOLIC BLOOD PRESSURE: 82 MMHG | WEIGHT: 188 LBS | HEIGHT: 75 IN | HEART RATE: 92 BPM

## 2019-12-31 DIAGNOSIS — J44.9 CHRONIC OBSTRUCTIVE PULMONARY DISEASE, UNSPECIFIED COPD TYPE (HCC): ICD-10-CM

## 2019-12-31 DIAGNOSIS — H61.21 IMPACTED CERUMEN, RIGHT EAR: ICD-10-CM

## 2019-12-31 DIAGNOSIS — M54.17 LUMBOSACRAL RADICULOPATHY AT S1: ICD-10-CM

## 2019-12-31 DIAGNOSIS — F41.9 ANXIETY: ICD-10-CM

## 2019-12-31 DIAGNOSIS — R16.0 HEPATOMEGALY: ICD-10-CM

## 2019-12-31 DIAGNOSIS — Z12.11 SCREEN FOR COLON CANCER: ICD-10-CM

## 2019-12-31 DIAGNOSIS — I10 ESSENTIAL HYPERTENSION: Primary | ICD-10-CM

## 2019-12-31 DIAGNOSIS — K70.10 ALCOHOLIC HEPATITIS WITHOUT ASCITES: ICD-10-CM

## 2019-12-31 LAB
A/G RATIO: 2.4 (ref 1.1–2.2)
ALBUMIN SERPL-MCNC: 4.7 G/DL (ref 3.4–5)
ALP BLD-CCNC: 70 U/L (ref 40–129)
ALT SERPL-CCNC: 18 U/L (ref 10–40)
ANION GAP SERPL CALCULATED.3IONS-SCNC: 18 MMOL/L (ref 3–16)
AST SERPL-CCNC: 19 U/L (ref 15–37)
BILIRUB SERPL-MCNC: 1 MG/DL (ref 0–1)
BUN BLDV-MCNC: 11 MG/DL (ref 7–20)
CALCIUM SERPL-MCNC: 9.8 MG/DL (ref 8.3–10.6)
CHLORIDE BLD-SCNC: 98 MMOL/L (ref 99–110)
CO2: 22 MMOL/L (ref 21–32)
CREAT SERPL-MCNC: 0.7 MG/DL (ref 0.9–1.3)
GFR AFRICAN AMERICAN: >60
GFR NON-AFRICAN AMERICAN: >60
GLOBULIN: 2 G/DL
GLUCOSE BLD-MCNC: 109 MG/DL (ref 70–99)
POTASSIUM SERPL-SCNC: 4.5 MMOL/L (ref 3.5–5.1)
SODIUM BLD-SCNC: 138 MMOL/L (ref 136–145)
TOTAL PROTEIN: 6.7 G/DL (ref 6.4–8.2)

## 2019-12-31 PROCEDURE — 99214 OFFICE O/P EST MOD 30 MIN: CPT | Performed by: FAMILY MEDICINE

## 2019-12-31 PROCEDURE — G8926 SPIRO NO PERF OR DOC: HCPCS | Performed by: FAMILY MEDICINE

## 2019-12-31 PROCEDURE — G8427 DOCREV CUR MEDS BY ELIG CLIN: HCPCS | Performed by: FAMILY MEDICINE

## 2019-12-31 PROCEDURE — 1036F TOBACCO NON-USER: CPT | Performed by: FAMILY MEDICINE

## 2019-12-31 PROCEDURE — G8420 CALC BMI NORM PARAMETERS: HCPCS | Performed by: FAMILY MEDICINE

## 2019-12-31 PROCEDURE — G8482 FLU IMMUNIZE ORDER/ADMIN: HCPCS | Performed by: FAMILY MEDICINE

## 2019-12-31 PROCEDURE — 36415 COLL VENOUS BLD VENIPUNCTURE: CPT | Performed by: FAMILY MEDICINE

## 2019-12-31 PROCEDURE — 3017F COLORECTAL CA SCREEN DOC REV: CPT | Performed by: FAMILY MEDICINE

## 2019-12-31 PROCEDURE — 3023F SPIROM DOC REV: CPT | Performed by: FAMILY MEDICINE

## 2019-12-31 RX ORDER — ALBUTEROL SULFATE 90 UG/1
2 AEROSOL, METERED RESPIRATORY (INHALATION) EVERY 6 HOURS PRN
Qty: 1 INHALER | Refills: 5 | Status: SHIPPED | OUTPATIENT
Start: 2019-12-31 | End: 2020-01-01 | Stop reason: SDUPTHER

## 2019-12-31 RX ORDER — HYDROXYZINE HYDROCHLORIDE 25 MG/1
25 TABLET, FILM COATED ORAL 2 TIMES DAILY
Qty: 60 TABLET | Refills: 5 | Status: SHIPPED | OUTPATIENT
Start: 2019-12-31 | End: 2020-01-01

## 2019-12-31 RX ORDER — LISINOPRIL 30 MG/1
30 TABLET ORAL DAILY
Qty: 30 TABLET | Refills: 5 | Status: ON HOLD
Start: 2019-12-31 | End: 2020-01-01 | Stop reason: HOSPADM

## 2019-12-31 RX ORDER — PAROXETINE HYDROCHLORIDE 20 MG/1
20 TABLET, FILM COATED ORAL DAILY
Qty: 30 TABLET | Refills: 5 | Status: SHIPPED | OUTPATIENT
Start: 2019-12-31 | End: 2020-01-01 | Stop reason: SDUPTHER

## 2019-12-31 RX ORDER — GABAPENTIN 300 MG/1
300 CAPSULE ORAL 2 TIMES DAILY
Qty: 60 CAPSULE | Refills: 5 | Status: SHIPPED | OUTPATIENT
Start: 2019-12-31 | End: 2020-01-01

## 2019-12-31 RX ORDER — FAMOTIDINE 40 MG/1
20 TABLET, FILM COATED ORAL 2 TIMES DAILY
Qty: 60 TABLET | Refills: 5 | Status: SHIPPED | OUTPATIENT
Start: 2019-12-31 | End: 2020-01-01

## 2020-01-01 ENCOUNTER — APPOINTMENT (OUTPATIENT)
Dept: ULTRASOUND IMAGING | Age: 50
DRG: 282 | End: 2020-01-01
Payer: COMMERCIAL

## 2020-01-01 ENCOUNTER — HOSPITAL ENCOUNTER (INPATIENT)
Age: 50
LOS: 5 days | Discharge: HOME | End: 2020-10-29
Attending: EMERGENCY MEDICINE | Admitting: INTERNAL MEDICINE
Payer: COMMERCIAL

## 2020-01-01 ENCOUNTER — OFFICE VISIT (OUTPATIENT)
Dept: FAMILY MEDICINE CLINIC | Age: 50
End: 2020-01-01
Payer: COMMERCIAL

## 2020-01-01 ENCOUNTER — APPOINTMENT (OUTPATIENT)
Dept: GENERAL RADIOLOGY | Age: 50
End: 2020-01-01
Payer: COMMERCIAL

## 2020-01-01 ENCOUNTER — APPOINTMENT (OUTPATIENT)
Dept: GENERAL RADIOLOGY | Age: 50
DRG: 282 | End: 2020-01-01
Payer: COMMERCIAL

## 2020-01-01 ENCOUNTER — TELEPHONE (OUTPATIENT)
Dept: FAMILY MEDICINE CLINIC | Age: 50
End: 2020-01-01

## 2020-01-01 ENCOUNTER — APPOINTMENT (OUTPATIENT)
Dept: NUCLEAR MEDICINE | Age: 50
DRG: 282 | End: 2020-01-01
Payer: COMMERCIAL

## 2020-01-01 ENCOUNTER — APPOINTMENT (OUTPATIENT)
Dept: CT IMAGING | Age: 50
DRG: 282 | End: 2020-01-01
Payer: COMMERCIAL

## 2020-01-01 ENCOUNTER — HOSPITAL ENCOUNTER (OUTPATIENT)
Dept: GENERAL RADIOLOGY | Age: 50
Discharge: HOME | End: 2020-06-23
Payer: COMMERCIAL

## 2020-01-01 ENCOUNTER — APPOINTMENT (OUTPATIENT)
Dept: CT IMAGING | Age: 50
End: 2020-01-01
Payer: COMMERCIAL

## 2020-01-01 ENCOUNTER — APPOINTMENT (OUTPATIENT)
Dept: INTERVENTIONAL RADIOLOGY/VASCULAR | Age: 50
DRG: 282 | End: 2020-01-01
Payer: COMMERCIAL

## 2020-01-01 ENCOUNTER — HOSPITAL ENCOUNTER (OUTPATIENT)
Age: 50
Discharge: HOME | End: 2020-06-23
Payer: COMMERCIAL

## 2020-01-01 ENCOUNTER — HOSPITAL ENCOUNTER (INPATIENT)
Age: 50
LOS: 26 days | Discharge: NURSING, REHAB OR LONG-TERM CARE | DRG: 282 | End: 2020-04-11
Attending: EMERGENCY MEDICINE | Admitting: INTERNAL MEDICINE
Payer: COMMERCIAL

## 2020-01-01 VITALS
RESPIRATION RATE: 24 BRPM | SYSTOLIC BLOOD PRESSURE: 144 MMHG | DIASTOLIC BLOOD PRESSURE: 89 MMHG | HEART RATE: 97 BPM | OXYGEN SATURATION: 96 % | TEMPERATURE: 98.7 F | BODY MASS INDEX: 23.56 KG/M2 | HEIGHT: 74 IN | WEIGHT: 183.6 LBS

## 2020-01-01 VITALS
WEIGHT: 196.43 LBS | DIASTOLIC BLOOD PRESSURE: 87 MMHG | HEIGHT: 74 IN | BODY MASS INDEX: 25.21 KG/M2 | HEART RATE: 75 BPM | TEMPERATURE: 98.5 F | RESPIRATION RATE: 16 BRPM | SYSTOLIC BLOOD PRESSURE: 154 MMHG | OXYGEN SATURATION: 99 %

## 2020-01-01 VITALS
TEMPERATURE: 97.3 F | DIASTOLIC BLOOD PRESSURE: 100 MMHG | SYSTOLIC BLOOD PRESSURE: 128 MMHG | HEIGHT: 74 IN | BODY MASS INDEX: 24.79 KG/M2 | HEART RATE: 84 BPM | WEIGHT: 193.2 LBS

## 2020-01-01 VITALS
SYSTOLIC BLOOD PRESSURE: 132 MMHG | HEART RATE: 105 BPM | OXYGEN SATURATION: 96 % | HEIGHT: 74 IN | TEMPERATURE: 97 F | DIASTOLIC BLOOD PRESSURE: 84 MMHG | WEIGHT: 184.8 LBS | BODY MASS INDEX: 23.72 KG/M2

## 2020-01-01 VITALS
OXYGEN SATURATION: 96 % | HEART RATE: 104 BPM | WEIGHT: 189.8 LBS | DIASTOLIC BLOOD PRESSURE: 80 MMHG | SYSTOLIC BLOOD PRESSURE: 120 MMHG | TEMPERATURE: 97.2 F | BODY MASS INDEX: 24.37 KG/M2

## 2020-01-01 VITALS
BODY MASS INDEX: 24.9 KG/M2 | HEIGHT: 74 IN | DIASTOLIC BLOOD PRESSURE: 70 MMHG | TEMPERATURE: 98.1 F | HEART RATE: 96 BPM | SYSTOLIC BLOOD PRESSURE: 122 MMHG | WEIGHT: 194 LBS

## 2020-01-01 LAB
ABO/RH: NORMAL
ADENOVIRUS DETECTION BY PCR: NOT DETECTED
ADENOVIRUS DETECTION BY PCR: NOT DETECTED
ALBUMIN SERPL-MCNC: 1.9 GM/DL (ref 3.4–5)
ALBUMIN SERPL-MCNC: 2.5 GM/DL (ref 3.4–5)
ALBUMIN SERPL-MCNC: 3.6 GM/DL (ref 3.4–5)
ALBUMIN SERPL-MCNC: 3.9 GM/DL (ref 3.4–5)
ALBUMIN SERPL-MCNC: 4.3 GM/DL (ref 3.4–5)
ALBUMIN SERPL-MCNC: 4.5 GM/DL (ref 3.4–5)
ALP BLD-CCNC: 53 IU/L (ref 40–129)
ALP BLD-CCNC: 69 IU/L (ref 40–129)
ALP BLD-CCNC: 79 IU/L (ref 40–129)
ALP BLD-CCNC: 80 IU/L (ref 40–128)
ALP BLD-CCNC: 84 IU/L (ref 40–129)
ALP BLD-CCNC: 93 IU/L (ref 40–129)
ALT SERPL-CCNC: 113 U/L (ref 10–40)
ALT SERPL-CCNC: 208 U/L (ref 10–40)
ALT SERPL-CCNC: 246 U/L (ref 10–40)
ALT SERPL-CCNC: 333 U/L (ref 10–40)
ALT SERPL-CCNC: 50 U/L (ref 10–40)
ALT SERPL-CCNC: 73 U/L (ref 10–40)
AMPHETAMINES: NEGATIVE
AMPHETAMINES: NEGATIVE
ANION GAP SERPL CALCULATED.3IONS-SCNC: 10 MMOL/L (ref 4–16)
ANION GAP SERPL CALCULATED.3IONS-SCNC: 11 MMOL/L (ref 4–16)
ANION GAP SERPL CALCULATED.3IONS-SCNC: 12 MMOL/L (ref 4–16)
ANION GAP SERPL CALCULATED.3IONS-SCNC: 13 MMOL/L (ref 4–16)
ANION GAP SERPL CALCULATED.3IONS-SCNC: 14 MMOL/L (ref 4–16)
ANION GAP SERPL CALCULATED.3IONS-SCNC: 15 MMOL/L (ref 4–16)
ANION GAP SERPL CALCULATED.3IONS-SCNC: 16 MMOL/L (ref 4–16)
ANION GAP SERPL CALCULATED.3IONS-SCNC: 17 MMOL/L (ref 4–16)
ANION GAP SERPL CALCULATED.3IONS-SCNC: 17 MMOL/L (ref 4–16)
ANION GAP SERPL CALCULATED.3IONS-SCNC: 19 MMOL/L (ref 4–16)
ANION GAP SERPL CALCULATED.3IONS-SCNC: 20 MMOL/L (ref 4–16)
ANION GAP SERPL CALCULATED.3IONS-SCNC: 21 MMOL/L (ref 4–16)
ANION GAP SERPL CALCULATED.3IONS-SCNC: 22 MMOL/L (ref 4–16)
ANION GAP SERPL CALCULATED.3IONS-SCNC: 23 MMOL/L (ref 4–16)
ANION GAP SERPL CALCULATED.3IONS-SCNC: 25 MMOL/L (ref 4–16)
ANION GAP SERPL CALCULATED.3IONS-SCNC: 29 MMOL/L (ref 4–16)
ANION GAP SERPL CALCULATED.3IONS-SCNC: 39 MMOL/L (ref 4–16)
ANION GAP SERPL CALCULATED.3IONS-SCNC: 8 MMOL/L (ref 4–16)
ANTIBODY SCREEN: NEGATIVE
APTT: 25.8 SECONDS (ref 25.1–37.1)
APTT: 35 SECONDS (ref 25.1–37.1)
APTT: 47.3 SECONDS (ref 25.1–37.1)
AST SERPL-CCNC: 152 IU/L (ref 15–37)
AST SERPL-CCNC: 190 IU/L (ref 15–37)
AST SERPL-CCNC: 218 IU/L (ref 15–37)
AST SERPL-CCNC: 384 IU/L (ref 15–37)
AST SERPL-CCNC: 43 IU/L (ref 15–37)
AST SERPL-CCNC: 68 IU/L (ref 15–37)
BACTERIA: NEGATIVE /HPF
BANDED NEUTROPHILS ABSOLUTE COUNT: 1.66 K/CU MM
BANDED NEUTROPHILS RELATIVE PERCENT: 6 % (ref 5–11)
BARBITURATE SCREEN URINE: NEGATIVE
BARBITURATE SCREEN URINE: NEGATIVE
BASE EXCESS: ABNORMAL (ref 0–3.3)
BASOPHILS ABSOLUTE: 0 K/CU MM
BASOPHILS ABSOLUTE: 0 K/CU MM
BASOPHILS ABSOLUTE: 0.1 K/CU MM
BASOPHILS RELATIVE PERCENT: 0.2 % (ref 0–1)
BASOPHILS RELATIVE PERCENT: 0.2 % (ref 0–1)
BASOPHILS RELATIVE PERCENT: 0.8 % (ref 0–1)
BASOPHILS RELATIVE PERCENT: 0.8 % (ref 0–1)
BASOPHILS RELATIVE PERCENT: 1.1 % (ref 0–1)
BASOPHILS RELATIVE PERCENT: 1.1 % (ref 0–1)
BENZODIAZEPINE SCREEN, URINE: NEGATIVE
BENZODIAZEPINE SCREEN, URINE: NEGATIVE
BETA-HYDROXYBUTYRATE: >20.8 MG/DL (ref 0–3)
BILIRUB SERPL-MCNC: 0.6 MG/DL (ref 0–1)
BILIRUB SERPL-MCNC: 0.6 MG/DL (ref 0–1)
BILIRUB SERPL-MCNC: 0.9 MG/DL (ref 0–1)
BILIRUB SERPL-MCNC: 2.1 MG/DL (ref 0–1)
BILIRUB SERPL-MCNC: 2.2 MG/DL (ref 0–1)
BILIRUB SERPL-MCNC: 2.5 MG/DL (ref 0–1)
BILIRUBIN DIRECT: 0.3 MG/DL (ref 0–0.3)
BILIRUBIN DIRECT: 0.3 MG/DL (ref 0–0.3)
BILIRUBIN DIRECT: 0.6 MG/DL (ref 0–0.3)
BILIRUBIN URINE: NEGATIVE MG/DL
BILIRUBIN, INDIRECT: 0.3 MG/DL (ref 0–0.7)
BILIRUBIN, INDIRECT: 0.6 MG/DL (ref 0–0.7)
BILIRUBIN, INDIRECT: 1.9 MG/DL (ref 0–0.7)
BLOOD, URINE: ABNORMAL
BLOOD, URINE: ABNORMAL
BLOOD, URINE: NEGATIVE
BORDETELLA PERTUSSIS PCR: NOT DETECTED
BORDETELLA PERTUSSIS PCR: NOT DETECTED
BUN BLDV-MCNC: 11 MG/DL (ref 6–23)
BUN BLDV-MCNC: 11 MG/DL (ref 6–23)
BUN BLDV-MCNC: 12 MG/DL (ref 6–23)
BUN BLDV-MCNC: 15 MG/DL (ref 6–23)
BUN BLDV-MCNC: 16 MG/DL (ref 6–23)
BUN BLDV-MCNC: 20 MG/DL (ref 6–23)
BUN BLDV-MCNC: 20 MG/DL (ref 6–23)
BUN BLDV-MCNC: 28 MG/DL (ref 6–23)
BUN BLDV-MCNC: 36 MG/DL (ref 6–23)
BUN BLDV-MCNC: 43 MG/DL (ref 6–23)
BUN BLDV-MCNC: 46 MG/DL (ref 6–23)
BUN BLDV-MCNC: 46 MG/DL (ref 6–23)
BUN BLDV-MCNC: 48 MG/DL (ref 6–23)
BUN BLDV-MCNC: 50 MG/DL (ref 6–23)
BUN BLDV-MCNC: 51 MG/DL (ref 6–23)
BUN BLDV-MCNC: 53 MG/DL (ref 6–23)
BUN BLDV-MCNC: 58 MG/DL (ref 6–23)
BUN BLDV-MCNC: 6 MG/DL (ref 6–23)
BUN BLDV-MCNC: 60 MG/DL (ref 6–23)
BUN BLDV-MCNC: 60 MG/DL (ref 6–23)
BUN BLDV-MCNC: 63 MG/DL (ref 6–23)
BUN BLDV-MCNC: 65 MG/DL (ref 6–23)
BUN BLDV-MCNC: 69 MG/DL (ref 6–23)
BUN BLDV-MCNC: 7 MG/DL (ref 6–23)
BUN BLDV-MCNC: 70 MG/DL (ref 6–23)
BUN BLDV-MCNC: 70 MG/DL (ref 6–23)
BUN BLDV-MCNC: 71 MG/DL (ref 6–23)
BUN BLDV-MCNC: 72 MG/DL (ref 6–23)
BUN BLDV-MCNC: 73 MG/DL (ref 6–23)
BUN BLDV-MCNC: 74 MG/DL (ref 6–23)
BUN BLDV-MCNC: 76 MG/DL (ref 6–23)
BUN BLDV-MCNC: 76 MG/DL (ref 6–23)
BUN BLDV-MCNC: 83 MG/DL (ref 6–23)
BUN BLDV-MCNC: 90 MG/DL (ref 6–23)
BUN BLDV-MCNC: 95 MG/DL (ref 6–23)
BUN BLDV-MCNC: 97 MG/DL (ref 6–23)
BUN BLDV-MCNC: 99 MG/DL (ref 6–23)
BUN BLDV-MCNC: NORMAL MG/DL (ref 6–23)
BUN BLDV-MCNC: NORMAL MG/DL (ref 6–23)
CALCIUM SERPL-MCNC: 7.1 MG/DL (ref 8.3–10.6)
CALCIUM SERPL-MCNC: 7.2 MG/DL (ref 8.3–10.6)
CALCIUM SERPL-MCNC: 7.2 MG/DL (ref 8.3–10.6)
CALCIUM SERPL-MCNC: 7.3 MG/DL (ref 8.3–10.6)
CALCIUM SERPL-MCNC: 7.3 MG/DL (ref 8.3–10.6)
CALCIUM SERPL-MCNC: 7.4 MG/DL (ref 8.3–10.6)
CALCIUM SERPL-MCNC: 7.5 MG/DL (ref 8.3–10.6)
CALCIUM SERPL-MCNC: 7.6 MG/DL (ref 8.3–10.6)
CALCIUM SERPL-MCNC: 7.7 MG/DL (ref 8.3–10.6)
CALCIUM SERPL-MCNC: 7.8 MG/DL (ref 8.3–10.6)
CALCIUM SERPL-MCNC: 7.8 MG/DL (ref 8.3–10.6)
CALCIUM SERPL-MCNC: 7.9 MG/DL (ref 8.3–10.6)
CALCIUM SERPL-MCNC: 8.1 MG/DL (ref 8.3–10.6)
CALCIUM SERPL-MCNC: 8.4 MG/DL (ref 8.3–10.6)
CALCIUM SERPL-MCNC: 8.4 MG/DL (ref 8.3–10.6)
CALCIUM SERPL-MCNC: 8.5 MG/DL (ref 8.3–10.6)
CALCIUM SERPL-MCNC: 8.6 MG/DL (ref 8.3–10.6)
CALCIUM SERPL-MCNC: 8.6 MG/DL (ref 8.3–10.6)
CALCIUM SERPL-MCNC: 8.7 MG/DL (ref 8.3–10.6)
CALCIUM SERPL-MCNC: 8.8 MG/DL (ref 8.3–10.6)
CALCIUM SERPL-MCNC: 8.9 MG/DL (ref 8.3–10.6)
CALCIUM SERPL-MCNC: NORMAL MG/DL (ref 8.3–10.6)
CALCIUM SERPL-MCNC: NORMAL MG/DL (ref 8.3–10.6)
CANNABINOID SCREEN URINE: NEGATIVE
CANNABINOID SCREEN URINE: NEGATIVE
CARBON MONOXIDE, BLOOD: 2.1 % (ref 0–5)
CELLULAR CASTS: 17 /LPF
CHLAMYDOPHILA PNEUMONIA PCR: NOT DETECTED
CHLAMYDOPHILA PNEUMONIA PCR: NOT DETECTED
CHLORIDE BLD-SCNC: 100 MMOL/L (ref 99–110)
CHLORIDE BLD-SCNC: 101 MMOL/L (ref 99–110)
CHLORIDE BLD-SCNC: 102 MMOL/L (ref 99–110)
CHLORIDE BLD-SCNC: 103 MMOL/L (ref 99–110)
CHLORIDE BLD-SCNC: 104 MMOL/L (ref 99–110)
CHLORIDE BLD-SCNC: 105 MMOL/L (ref 99–110)
CHLORIDE BLD-SCNC: 105 MMOL/L (ref 99–110)
CHLORIDE BLD-SCNC: 108 MMOL/L (ref 99–110)
CHLORIDE BLD-SCNC: 109 MMOL/L (ref 99–110)
CHLORIDE BLD-SCNC: 109 MMOL/L (ref 99–110)
CHLORIDE BLD-SCNC: 112 MMOL/L (ref 99–110)
CHLORIDE BLD-SCNC: 112 MMOL/L (ref 99–110)
CHLORIDE BLD-SCNC: 115 MMOL/L (ref 99–110)
CHLORIDE BLD-SCNC: 120 MMOL/L (ref 99–110)
CHLORIDE BLD-SCNC: 120 MMOL/L (ref 99–110)
CHLORIDE BLD-SCNC: 121 MMOL/L (ref 99–110)
CHLORIDE BLD-SCNC: 121 MMOL/L (ref 99–110)
CHLORIDE BLD-SCNC: 125 MMOL/L (ref 99–110)
CHLORIDE BLD-SCNC: 71 MMOL/L (ref 99–110)
CHLORIDE BLD-SCNC: 78 MMOL/L (ref 99–110)
CHLORIDE BLD-SCNC: 80 MMOL/L (ref 99–110)
CHLORIDE BLD-SCNC: 83 MMOL/L (ref 99–110)
CHLORIDE BLD-SCNC: 84 MMOL/L (ref 99–110)
CHLORIDE BLD-SCNC: 86 MMOL/L (ref 99–110)
CHLORIDE BLD-SCNC: 87 MMOL/L (ref 99–110)
CHLORIDE BLD-SCNC: 87 MMOL/L (ref 99–110)
CHLORIDE BLD-SCNC: 91 MMOL/L (ref 99–110)
CHLORIDE BLD-SCNC: 93 MMOL/L (ref 99–110)
CHLORIDE BLD-SCNC: 95 MMOL/L (ref 99–110)
CHLORIDE BLD-SCNC: 96 MMOL/L (ref 99–110)
CHLORIDE BLD-SCNC: 96 MMOL/L (ref 99–110)
CHLORIDE BLD-SCNC: 97 MMOL/L (ref 99–110)
CHLORIDE BLD-SCNC: 97 MMOL/L (ref 99–110)
CHLORIDE BLD-SCNC: 98 MMOL/L (ref 99–110)
CHLORIDE BLD-SCNC: 99 MMOL/L (ref 99–110)
CHLORIDE BLD-SCNC: NORMAL MMOL/L (ref 99–110)
CHLORIDE BLD-SCNC: NORMAL MMOL/L (ref 99–110)
CHLORIDE URINE RANDOM: 15 MMOL/L (ref 43–210)
CHOLESTEROL: 104 MG/DL
CHP ED QC CHECK: YES
CLARITY: ABNORMAL
CLARITY: CLEAR
CLARITY: CLEAR
CLOSTRIDIUM DIFFICILE, PCR: ABNORMAL
CLOSTRIDIUM DIFFICILE, PCR: ABNORMAL
CO2 CONTENT: 21.6 MMOL/L (ref 19–24)
CO2: 14 MMOL/L (ref 21–32)
CO2: 15 MMOL/L (ref 21–32)
CO2: 16 MMOL/L (ref 21–32)
CO2: 17 MMOL/L (ref 21–32)
CO2: 18 MMOL/L (ref 21–32)
CO2: 19 MMOL/L (ref 21–32)
CO2: 20 MMOL/L (ref 21–32)
CO2: 21 MMOL/L (ref 21–32)
CO2: 21 MMOL/L (ref 21–32)
CO2: 22 MMOL/L (ref 21–32)
CO2: 23 MMOL/L (ref 21–32)
CO2: 24 MMOL/L (ref 21–32)
CO2: 25 MMOL/L (ref 21–32)
CO2: 26 MMOL/L (ref 21–32)
CO2: 27 MMOL/L (ref 21–32)
CO2: 28 MMOL/L (ref 21–32)
CO2: NORMAL MMOL/L (ref 21–32)
CO2: NORMAL MMOL/L (ref 21–32)
COCAINE METABOLITE: NEGATIVE
COCAINE METABOLITE: NEGATIVE
COLOR: ABNORMAL
COLOR: YELLOW
COLOR: YELLOW
COMMENT: ABNORMAL
COMPONENT: NORMAL
CORONAVIRUS 229E PCR: NOT DETECTED
CORONAVIRUS 229E PCR: NOT DETECTED
CORONAVIRUS HKU1 PCR: NOT DETECTED
CORONAVIRUS HKU1 PCR: NOT DETECTED
CORONAVIRUS NL63 PCR: NOT DETECTED
CORONAVIRUS NL63 PCR: NOT DETECTED
CORONAVIRUS OC43 PCR: NOT DETECTED
CORONAVIRUS OC43 PCR: NOT DETECTED
CREAT SERPL-MCNC: 0.6 MG/DL (ref 0.9–1.3)
CREAT SERPL-MCNC: 0.7 MG/DL (ref 0.9–1.3)
CREAT SERPL-MCNC: 0.8 MG/DL (ref 0.9–1.3)
CREAT SERPL-MCNC: 1.1 MG/DL (ref 0.9–1.3)
CREAT SERPL-MCNC: 1.1 MG/DL (ref 0.9–1.3)
CREAT SERPL-MCNC: 1.2 MG/DL (ref 0.9–1.3)
CREAT SERPL-MCNC: 1.4 MG/DL (ref 0.9–1.3)
CREAT SERPL-MCNC: 1.5 MG/DL (ref 0.9–1.3)
CREAT SERPL-MCNC: 1.7 MG/DL (ref 0.9–1.3)
CREAT SERPL-MCNC: 2.1 MG/DL (ref 0.9–1.3)
CREAT SERPL-MCNC: 2.1 MG/DL (ref 0.9–1.3)
CREAT SERPL-MCNC: 2.2 MG/DL (ref 0.9–1.3)
CREAT SERPL-MCNC: 2.2 MG/DL (ref 0.9–1.3)
CREAT SERPL-MCNC: 2.5 MG/DL (ref 0.9–1.3)
CREAT SERPL-MCNC: 2.5 MG/DL (ref 0.9–1.3)
CREAT SERPL-MCNC: 2.7 MG/DL (ref 0.9–1.3)
CREAT SERPL-MCNC: 2.8 MG/DL (ref 0.9–1.3)
CREAT SERPL-MCNC: 2.9 MG/DL (ref 0.9–1.3)
CREAT SERPL-MCNC: 3.3 MG/DL (ref 0.9–1.3)
CREAT SERPL-MCNC: 3.4 MG/DL (ref 0.9–1.3)
CREAT SERPL-MCNC: 3.8 MG/DL (ref 0.9–1.3)
CREAT SERPL-MCNC: 3.9 MG/DL (ref 0.9–1.3)
CREAT SERPL-MCNC: 4.4 MG/DL (ref 0.9–1.3)
CREAT SERPL-MCNC: 4.8 MG/DL (ref 0.9–1.3)
CREAT SERPL-MCNC: 5.1 MG/DL (ref 0.9–1.3)
CREAT SERPL-MCNC: 5.4 MG/DL (ref 0.9–1.3)
CREAT SERPL-MCNC: 5.5 MG/DL (ref 0.9–1.3)
CREAT SERPL-MCNC: 5.7 MG/DL (ref 0.9–1.3)
CREAT SERPL-MCNC: 5.9 MG/DL (ref 0.9–1.3)
CREAT SERPL-MCNC: NORMAL MG/DL (ref 0.9–1.3)
CREAT SERPL-MCNC: NORMAL MG/DL (ref 0.9–1.3)
CROSSMATCH RESULT: NORMAL
CULTURE: NORMAL
D DIMER: 1015 NG/ML(DDU)
DIFFERENTIAL TYPE: ABNORMAL
DOSE AMOUNT: ABNORMAL
DOSE AMOUNT: NORMAL
DOSE TIME: ABNORMAL
DOSE TIME: NORMAL
EKG ATRIAL RATE: 104 BPM
EKG ATRIAL RATE: 107 BPM
EKG ATRIAL RATE: 84 BPM
EKG ATRIAL RATE: 88 BPM
EKG DIAGNOSIS: NORMAL
EKG P AXIS: 28 DEGREES
EKG P AXIS: 33 DEGREES
EKG P AXIS: 73 DEGREES
EKG P AXIS: 76 DEGREES
EKG P-R INTERVAL: 120 MS
EKG P-R INTERVAL: 124 MS
EKG P-R INTERVAL: 132 MS
EKG P-R INTERVAL: 132 MS
EKG Q-T INTERVAL: 398 MS
EKG Q-T INTERVAL: 402 MS
EKG Q-T INTERVAL: 404 MS
EKG Q-T INTERVAL: 424 MS
EKG QRS DURATION: 100 MS
EKG QRS DURATION: 86 MS
EKG QRS DURATION: 88 MS
EKG QRS DURATION: 96 MS
EKG QTC CALCULATION (BAZETT): 475 MS
EKG QTC CALCULATION (BAZETT): 488 MS
EKG QTC CALCULATION (BAZETT): 531 MS
EKG QTC CALCULATION (BAZETT): 557 MS
EKG R AXIS: 33 DEGREES
EKG R AXIS: 55 DEGREES
EKG R AXIS: 80 DEGREES
EKG R AXIS: 82 DEGREES
EKG T AXIS: 53 DEGREES
EKG T AXIS: 63 DEGREES
EKG T AXIS: 80 DEGREES
EKG T AXIS: 89 DEGREES
EKG VENTRICULAR RATE: 104 BPM
EKG VENTRICULAR RATE: 107 BPM
EKG VENTRICULAR RATE: 84 BPM
EKG VENTRICULAR RATE: 88 BPM
EOSINOPHILS ABSOLUTE: 0 K/CU MM
EOSINOPHILS ABSOLUTE: 0 K/CU MM
EOSINOPHILS ABSOLUTE: 0.1 K/CU MM
EOSINOPHILS ABSOLUTE: 0.2 K/CU MM
EOSINOPHILS ABSOLUTE: 0.3 K/CU MM
EOSINOPHILS ABSOLUTE: 0.9 K/CU MM
EOSINOPHILS RELATIVE PERCENT: 0 % (ref 0–3)
EOSINOPHILS RELATIVE PERCENT: 0 % (ref 0–3)
EOSINOPHILS RELATIVE PERCENT: 2.7 % (ref 0–3)
EOSINOPHILS RELATIVE PERCENT: 2.8 % (ref 0–3)
EOSINOPHILS RELATIVE PERCENT: 5.6 % (ref 0–3)
EOSINOPHILS RELATIVE PERCENT: 9.3 % (ref 0–3)
ESTIMATED AVERAGE GLUCOSE: 97 MG/DL
FOLATE: 15.7 NG/ML (ref 3.1–17.5)
GFR AFRICAN AMERICAN: 12 ML/MIN/1.73M2
GFR AFRICAN AMERICAN: 13 ML/MIN/1.73M2
GFR AFRICAN AMERICAN: 13 ML/MIN/1.73M2
GFR AFRICAN AMERICAN: 14 ML/MIN/1.73M2
GFR AFRICAN AMERICAN: 15 ML/MIN/1.73M2
GFR AFRICAN AMERICAN: 16 ML/MIN/1.73M2
GFR AFRICAN AMERICAN: 17 ML/MIN/1.73M2
GFR AFRICAN AMERICAN: 20 ML/MIN/1.73M2
GFR AFRICAN AMERICAN: 20 ML/MIN/1.73M2
GFR AFRICAN AMERICAN: 23 ML/MIN/1.73M2
GFR AFRICAN AMERICAN: 24 ML/MIN/1.73M2
GFR AFRICAN AMERICAN: 28 ML/MIN/1.73M2
GFR AFRICAN AMERICAN: 29 ML/MIN/1.73M2
GFR AFRICAN AMERICAN: 30 ML/MIN/1.73M2
GFR AFRICAN AMERICAN: 33 ML/MIN/1.73M2
GFR AFRICAN AMERICAN: 33 ML/MIN/1.73M2
GFR AFRICAN AMERICAN: 38 ML/MIN/1.73M2
GFR AFRICAN AMERICAN: 38 ML/MIN/1.73M2
GFR AFRICAN AMERICAN: 40 ML/MIN/1.73M2
GFR AFRICAN AMERICAN: 40 ML/MIN/1.73M2
GFR AFRICAN AMERICAN: 52 ML/MIN/1.73M2
GFR AFRICAN AMERICAN: 60 ML/MIN/1.73M2
GFR AFRICAN AMERICAN: >60 ML/MIN/1.73M2
GFR AFRICAN AMERICAN: NORMAL ML/MIN/1.73M2
GFR AFRICAN AMERICAN: NORMAL ML/MIN/1.73M2
GFR NON-AFRICAN AMERICAN: 10 ML/MIN/1.73M2
GFR NON-AFRICAN AMERICAN: 11 ML/MIN/1.73M2
GFR NON-AFRICAN AMERICAN: 12 ML/MIN/1.73M2
GFR NON-AFRICAN AMERICAN: 13 ML/MIN/1.73M2
GFR NON-AFRICAN AMERICAN: 14 ML/MIN/1.73M2
GFR NON-AFRICAN AMERICAN: 16 ML/MIN/1.73M2
GFR NON-AFRICAN AMERICAN: 17 ML/MIN/1.73M2
GFR NON-AFRICAN AMERICAN: 19 ML/MIN/1.73M2
GFR NON-AFRICAN AMERICAN: 20 ML/MIN/1.73M2
GFR NON-AFRICAN AMERICAN: 23 ML/MIN/1.73M2
GFR NON-AFRICAN AMERICAN: 24 ML/MIN/1.73M2
GFR NON-AFRICAN AMERICAN: 25 ML/MIN/1.73M2
GFR NON-AFRICAN AMERICAN: 27 ML/MIN/1.73M2
GFR NON-AFRICAN AMERICAN: 27 ML/MIN/1.73M2
GFR NON-AFRICAN AMERICAN: 32 ML/MIN/1.73M2
GFR NON-AFRICAN AMERICAN: 32 ML/MIN/1.73M2
GFR NON-AFRICAN AMERICAN: 33 ML/MIN/1.73M2
GFR NON-AFRICAN AMERICAN: 33 ML/MIN/1.73M2
GFR NON-AFRICAN AMERICAN: 43 ML/MIN/1.73M2
GFR NON-AFRICAN AMERICAN: 49 ML/MIN/1.73M2
GFR NON-AFRICAN AMERICAN: 53 ML/MIN/1.73M2
GFR NON-AFRICAN AMERICAN: >60 ML/MIN/1.73M2
GFR NON-AFRICAN AMERICAN: NORMAL ML/MIN/1.73M2
GFR NON-AFRICAN AMERICAN: NORMAL ML/MIN/1.73M2
GLUCOSE BLD-MCNC: 101 MG/DL (ref 70–99)
GLUCOSE BLD-MCNC: 101 MG/DL (ref 70–99)
GLUCOSE BLD-MCNC: 105 MG/DL (ref 70–99)
GLUCOSE BLD-MCNC: 106 MG/DL (ref 70–99)
GLUCOSE BLD-MCNC: 108 MG/DL (ref 70–99)
GLUCOSE BLD-MCNC: 108 MG/DL (ref 70–99)
GLUCOSE BLD-MCNC: 109 MG/DL (ref 70–99)
GLUCOSE BLD-MCNC: 109 MG/DL (ref 70–99)
GLUCOSE BLD-MCNC: 110 MG/DL (ref 70–99)
GLUCOSE BLD-MCNC: 111 MG/DL (ref 70–99)
GLUCOSE BLD-MCNC: 112 MG/DL (ref 70–99)
GLUCOSE BLD-MCNC: 112 MG/DL (ref 70–99)
GLUCOSE BLD-MCNC: 113 MG/DL (ref 70–99)
GLUCOSE BLD-MCNC: 114 MG/DL (ref 70–99)
GLUCOSE BLD-MCNC: 115 MG/DL (ref 70–99)
GLUCOSE BLD-MCNC: 117 MG/DL (ref 70–99)
GLUCOSE BLD-MCNC: 118 MG/DL (ref 70–99)
GLUCOSE BLD-MCNC: 119 MG/DL (ref 70–99)
GLUCOSE BLD-MCNC: 119 MG/DL (ref 70–99)
GLUCOSE BLD-MCNC: 120 MG/DL (ref 70–99)
GLUCOSE BLD-MCNC: 120 MG/DL (ref 70–99)
GLUCOSE BLD-MCNC: 121 MG/DL (ref 70–99)
GLUCOSE BLD-MCNC: 122 MG/DL (ref 70–99)
GLUCOSE BLD-MCNC: 123 MG/DL (ref 70–99)
GLUCOSE BLD-MCNC: 123 MG/DL (ref 70–99)
GLUCOSE BLD-MCNC: 124 MG/DL (ref 70–99)
GLUCOSE BLD-MCNC: 125 MG/DL (ref 70–99)
GLUCOSE BLD-MCNC: 128 MG/DL (ref 70–99)
GLUCOSE BLD-MCNC: 136 MG/DL (ref 70–99)
GLUCOSE BLD-MCNC: 140 MG/DL (ref 70–99)
GLUCOSE BLD-MCNC: 142 MG/DL (ref 70–99)
GLUCOSE BLD-MCNC: 144 MG/DL (ref 70–99)
GLUCOSE BLD-MCNC: 145 MG/DL (ref 70–99)
GLUCOSE BLD-MCNC: 145 MG/DL (ref 70–99)
GLUCOSE BLD-MCNC: 146 MG/DL (ref 70–99)
GLUCOSE BLD-MCNC: 146 MG/DL (ref 70–99)
GLUCOSE BLD-MCNC: 147 MG/DL (ref 70–99)
GLUCOSE BLD-MCNC: 148 MG/DL (ref 70–99)
GLUCOSE BLD-MCNC: 149 MG/DL (ref 70–99)
GLUCOSE BLD-MCNC: 154 MG/DL (ref 70–99)
GLUCOSE BLD-MCNC: 156 MG/DL (ref 70–99)
GLUCOSE BLD-MCNC: 158 MG/DL (ref 70–99)
GLUCOSE BLD-MCNC: 162 MG/DL (ref 70–99)
GLUCOSE BLD-MCNC: 162 MG/DL (ref 70–99)
GLUCOSE BLD-MCNC: 167 MG/DL (ref 70–99)
GLUCOSE BLD-MCNC: 180 MG/DL (ref 70–99)
GLUCOSE BLD-MCNC: 187 MG/DL (ref 70–99)
GLUCOSE BLD-MCNC: 189 MG/DL (ref 70–99)
GLUCOSE BLD-MCNC: 196 MG/DL (ref 70–99)
GLUCOSE BLD-MCNC: 196 MG/DL (ref 70–99)
GLUCOSE BLD-MCNC: 218 MG/DL (ref 70–99)
GLUCOSE BLD-MCNC: 530 MG/DL (ref 70–99)
GLUCOSE BLD-MCNC: 66 MG/DL (ref 70–99)
GLUCOSE BLD-MCNC: 664 MG/DL (ref 70–99)
GLUCOSE BLD-MCNC: 73 MG/DL (ref 70–99)
GLUCOSE BLD-MCNC: 84 MG/DL
GLUCOSE BLD-MCNC: 84 MG/DL (ref 70–99)
GLUCOSE BLD-MCNC: 86 MG/DL (ref 70–99)
GLUCOSE BLD-MCNC: 88 MG/DL (ref 70–99)
GLUCOSE BLD-MCNC: 88 MG/DL (ref 70–99)
GLUCOSE BLD-MCNC: 91 MG/DL (ref 70–99)
GLUCOSE BLD-MCNC: 94 MG/DL (ref 70–99)
GLUCOSE BLD-MCNC: 95 MG/DL (ref 70–99)
GLUCOSE BLD-MCNC: 96 MG/DL (ref 70–99)
GLUCOSE BLD-MCNC: 96 MG/DL (ref 70–99)
GLUCOSE BLD-MCNC: 98 MG/DL (ref 70–99)
GLUCOSE BLD-MCNC: 99 MG/DL (ref 70–99)
GLUCOSE BLD-MCNC: NORMAL MG/DL (ref 70–99)
GLUCOSE, URINE: 50 MG/DL
GLUCOSE, URINE: NEGATIVE MG/DL
GLUCOSE, URINE: NEGATIVE MG/DL
GRAM SMEAR: NORMAL
GRANULAR CASTS: 2 /LPF
HBA1C MFR BLD: 5 % (ref 4.2–6.3)
HCO3 ARTERIAL: 20.6 MMOL/L (ref 18–23)
HCT VFR BLD CALC: 21.1 % (ref 42–52)
HCT VFR BLD CALC: 21.6 % (ref 42–52)
HCT VFR BLD CALC: 21.7 % (ref 42–52)
HCT VFR BLD CALC: 22 % (ref 42–52)
HCT VFR BLD CALC: 22.2 % (ref 42–52)
HCT VFR BLD CALC: 22.3 % (ref 42–52)
HCT VFR BLD CALC: 22.3 % (ref 42–52)
HCT VFR BLD CALC: 23.6 % (ref 42–52)
HCT VFR BLD CALC: 25.3 % (ref 42–52)
HCT VFR BLD CALC: 25.4 % (ref 42–52)
HCT VFR BLD CALC: 25.5 % (ref 42–52)
HCT VFR BLD CALC: 25.6 % (ref 42–52)
HCT VFR BLD CALC: 25.6 % (ref 42–52)
HCT VFR BLD CALC: 26.2 % (ref 42–52)
HCT VFR BLD CALC: 26.5 % (ref 42–52)
HCT VFR BLD CALC: 26.7 % (ref 42–52)
HCT VFR BLD CALC: 26.8 % (ref 42–52)
HCT VFR BLD CALC: 28.1 % (ref 42–52)
HCT VFR BLD CALC: 28.1 % (ref 42–52)
HCT VFR BLD CALC: 30.7 % (ref 42–52)
HCT VFR BLD CALC: 32.9 % (ref 42–52)
HCT VFR BLD CALC: 33.5 % (ref 42–52)
HCT VFR BLD CALC: 34 % (ref 42–52)
HCT VFR BLD CALC: 34.1 % (ref 42–52)
HCT VFR BLD CALC: 36 % (ref 42–52)
HCT VFR BLD CALC: 38 % (ref 42–52)
HCT VFR BLD CALC: 43.5 % (ref 42–52)
HDLC SERPL-MCNC: 37 MG/DL
HEMOCCULT STL QL: POSITIVE
HEMOGLOBIN: 10 GM/DL (ref 13.5–18)
HEMOGLOBIN: 10.1 GM/DL (ref 13.5–18)
HEMOGLOBIN: 10.6 GM/DL (ref 13.5–18)
HEMOGLOBIN: 11.1 GM/DL (ref 13.5–18)
HEMOGLOBIN: 11.4 GM/DL (ref 13.5–18)
HEMOGLOBIN: 11.5 GM/DL (ref 13.5–18)
HEMOGLOBIN: 11.9 GM/DL (ref 13.5–18)
HEMOGLOBIN: 13.6 GM/DL (ref 13.5–18)
HEMOGLOBIN: 15.3 GM/DL (ref 13.5–18)
HEMOGLOBIN: 7 GM/DL (ref 13.5–18)
HEMOGLOBIN: 7.4 GM/DL (ref 13.5–18)
HEMOGLOBIN: 7.8 GM/DL (ref 13.5–18)
HEMOGLOBIN: 8.1 GM/DL (ref 13.5–18)
HEMOGLOBIN: 8.2 GM/DL (ref 13.5–18)
HEMOGLOBIN: 8.2 GM/DL (ref 13.5–18)
HEMOGLOBIN: 8.4 GM/DL (ref 13.5–18)
HEMOGLOBIN: 8.5 GM/DL (ref 13.5–18)
HEMOGLOBIN: 8.6 GM/DL (ref 13.5–18)
HEMOGLOBIN: 8.6 GM/DL (ref 13.5–18)
HEMOGLOBIN: 8.9 GM/DL (ref 13.5–18)
HEMOGLOBIN: ABNORMAL GM/DL (ref 13.5–18)
HUMAN METAPNEUMOVIRUS PCR: NOT DETECTED
HUMAN METAPNEUMOVIRUS PCR: NOT DETECTED
HYALINE CASTS: 10 /LPF
HYALINE CASTS: >20 /LPF
IMMATURE NEUTROPHIL %: 0.4 % (ref 0–0.43)
IMMATURE NEUTROPHIL %: 0.6 % (ref 0–0.43)
IMMATURE NEUTROPHIL %: 0.6 % (ref 0–0.43)
IMMATURE NEUTROPHIL %: 0.7 % (ref 0–0.43)
IMMATURE NEUTROPHIL %: 0.8 % (ref 0–0.43)
IMMATURE NEUTROPHIL %: 1.6 % (ref 0–0.43)
INFLUENZA A BY PCR: NOT DETECTED
INFLUENZA A BY PCR: NOT DETECTED
INFLUENZA A H1 (2009) PCR: NOT DETECTED
INFLUENZA A H1 (2009) PCR: NOT DETECTED
INFLUENZA A H1 PANDEMIC PCR: NOT DETECTED
INFLUENZA A H1 PANDEMIC PCR: NOT DETECTED
INFLUENZA A H3 PCR: NOT DETECTED
INFLUENZA A H3 PCR: NOT DETECTED
INFLUENZA B BY PCR: NOT DETECTED
INFLUENZA B BY PCR: NOT DETECTED
INR BLD: 1.07 INDEX
INR BLD: 1.55 INDEX
INR BLD: 2.51 INDEX
IRON: 10 UG/DL (ref 59–158)
KETONES, URINE: ABNORMAL MG/DL
KETONES, URINE: ABNORMAL MG/DL
KETONES, URINE: NEGATIVE MG/DL
LACTATE: 1.7 MMOL/L (ref 0.4–2)
LACTATE: 1.8 MMOL/L (ref 0.4–2)
LACTATE: ABNORMAL MMOL/L (ref 0.4–2)
LDL CHOLESTEROL DIRECT: 45 MG/DL
LEGIONELLA URINARY AG: NEGATIVE
LEUKOCYTE ESTERASE, URINE: NEGATIVE
LIPASE: 246 IU/L (ref 13–60)
LIPASE: >3000 IU/L (ref 13–60)
LV EF: 35 %
LV EF: 53 %
LVEF MODALITY: NORMAL
LVEF MODALITY: NORMAL
LYMPHOCYTES ABSOLUTE: 0.3 K/CU MM
LYMPHOCYTES ABSOLUTE: 0.3 K/CU MM
LYMPHOCYTES ABSOLUTE: 0.4 K/CU MM
LYMPHOCYTES ABSOLUTE: 0.8 K/CU MM
LYMPHOCYTES ABSOLUTE: 0.9 K/CU MM
LYMPHOCYTES ABSOLUTE: 1.1 K/CU MM
LYMPHOCYTES ABSOLUTE: 1.6 K/CU MM
LYMPHOCYTES RELATIVE PERCENT: 1 % (ref 24–44)
LYMPHOCYTES RELATIVE PERCENT: 1.2 % (ref 24–44)
LYMPHOCYTES RELATIVE PERCENT: 1.8 % (ref 24–44)
LYMPHOCYTES RELATIVE PERCENT: 12 % (ref 24–44)
LYMPHOCYTES RELATIVE PERCENT: 16.7 % (ref 24–44)
LYMPHOCYTES RELATIVE PERCENT: 17.8 % (ref 24–44)
LYMPHOCYTES RELATIVE PERCENT: 20.6 % (ref 24–44)
Lab: NORMAL
MAGNESIUM: 1.1 MG/DL (ref 1.8–2.4)
MAGNESIUM: 1.5 MG/DL (ref 1.8–2.4)
MAGNESIUM: 1.5 MG/DL (ref 1.8–2.4)
MAGNESIUM: 1.6 MG/DL (ref 1.8–2.4)
MAGNESIUM: 1.8 MG/DL (ref 1.8–2.4)
MAGNESIUM: 1.9 MG/DL (ref 1.8–2.4)
MAGNESIUM: 2 MG/DL (ref 1.8–2.4)
MAGNESIUM: 2.1 MG/DL (ref 1.8–2.4)
MAGNESIUM: 2.1 MG/DL (ref 1.8–2.4)
MAGNESIUM: 2.3 MG/DL (ref 1.8–2.4)
MCH RBC QN AUTO: 29.8 PG (ref 27–31)
MCH RBC QN AUTO: 30 PG (ref 27–31)
MCH RBC QN AUTO: 30.2 PG (ref 27–31)
MCH RBC QN AUTO: 30.3 PG (ref 27–31)
MCH RBC QN AUTO: 30.4 PG (ref 27–31)
MCH RBC QN AUTO: 30.5 PG (ref 27–31)
MCH RBC QN AUTO: 30.6 PG (ref 27–31)
MCH RBC QN AUTO: 30.6 PG (ref 27–31)
MCH RBC QN AUTO: 30.7 PG (ref 27–31)
MCH RBC QN AUTO: 30.7 PG (ref 27–31)
MCH RBC QN AUTO: 30.8 PG (ref 27–31)
MCH RBC QN AUTO: 31.1 PG (ref 27–31)
MCH RBC QN AUTO: 31.4 PG (ref 27–31)
MCH RBC QN AUTO: 31.9 PG (ref 27–31)
MCH RBC QN AUTO: 31.9 PG (ref 27–31)
MCH RBC QN AUTO: 33.4 PG (ref 27–31)
MCH RBC QN AUTO: 33.8 PG (ref 27–31)
MCH RBC QN AUTO: 33.9 PG (ref 27–31)
MCH RBC QN AUTO: 34 PG (ref 27–31)
MCHC RBC AUTO-ENTMCNC: 31.3 % (ref 32–36)
MCHC RBC AUTO-ENTMCNC: 31.4 % (ref 32–36)
MCHC RBC AUTO-ENTMCNC: 31.5 % (ref 32–36)
MCHC RBC AUTO-ENTMCNC: 31.5 % (ref 32–36)
MCHC RBC AUTO-ENTMCNC: 31.6 % (ref 32–36)
MCHC RBC AUTO-ENTMCNC: 31.7 % (ref 32–36)
MCHC RBC AUTO-ENTMCNC: 31.8 % (ref 32–36)
MCHC RBC AUTO-ENTMCNC: 31.9 % (ref 32–36)
MCHC RBC AUTO-ENTMCNC: 32.2 % (ref 32–36)
MCHC RBC AUTO-ENTMCNC: 32.3 % (ref 32–36)
MCHC RBC AUTO-ENTMCNC: 32.9 % (ref 32–36)
MCHC RBC AUTO-ENTMCNC: 33.1 % (ref 32–36)
MCHC RBC AUTO-ENTMCNC: 33.2 % (ref 32–36)
MCHC RBC AUTO-ENTMCNC: 33.2 % (ref 32–36)
MCHC RBC AUTO-ENTMCNC: 33.4 % (ref 32–36)
MCHC RBC AUTO-ENTMCNC: 33.8 % (ref 32–36)
MCHC RBC AUTO-ENTMCNC: 34 % (ref 32–36)
MCHC RBC AUTO-ENTMCNC: 35.2 % (ref 32–36)
MCHC RBC AUTO-ENTMCNC: 35.6 % (ref 32–36)
MCHC RBC AUTO-ENTMCNC: 35.8 % (ref 32–36)
MCV RBC AUTO: 100 FL (ref 78–100)
MCV RBC AUTO: 101.1 FL (ref 78–100)
MCV RBC AUTO: 101.8 FL (ref 78–100)
MCV RBC AUTO: 102.4 FL (ref 78–100)
MCV RBC AUTO: 87.8 FL (ref 78–100)
MCV RBC AUTO: 88.4 FL (ref 78–100)
MCV RBC AUTO: 90.8 FL (ref 78–100)
MCV RBC AUTO: 92.2 FL (ref 78–100)
MCV RBC AUTO: 93.3 FL (ref 78–100)
MCV RBC AUTO: 93.7 FL (ref 78–100)
MCV RBC AUTO: 93.8 FL (ref 78–100)
MCV RBC AUTO: 94.3 FL (ref 78–100)
MCV RBC AUTO: 94.4 FL (ref 78–100)
MCV RBC AUTO: 94.5 FL (ref 78–100)
MCV RBC AUTO: 94.9 FL (ref 78–100)
MCV RBC AUTO: 95 FL (ref 78–100)
MCV RBC AUTO: 95.1 FL (ref 78–100)
MCV RBC AUTO: 95.3 FL (ref 78–100)
MCV RBC AUTO: 95.5 FL (ref 78–100)
MCV RBC AUTO: 95.6 FL (ref 78–100)
MCV RBC AUTO: 95.7 FL (ref 78–100)
MCV RBC AUTO: 96.1 FL (ref 78–100)
MCV RBC AUTO: 96.4 FL (ref 78–100)
MCV RBC AUTO: 96.4 FL (ref 78–100)
MCV RBC AUTO: 97 FL (ref 78–100)
MCV RBC AUTO: 97 FL (ref 78–100)
METHEMOGLOBIN ARTERIAL: 1.1 %
MONOCYTES ABSOLUTE: 0.5 K/CU MM
MONOCYTES ABSOLUTE: 0.6 K/CU MM
MONOCYTES ABSOLUTE: 0.8 K/CU MM
MONOCYTES ABSOLUTE: 1.2 K/CU MM
MONOCYTES ABSOLUTE: 1.3 K/CU MM
MONOCYTES ABSOLUTE: 1.3 K/CU MM
MONOCYTES ABSOLUTE: 1.7 K/CU MM
MONOCYTES RELATIVE PERCENT: 10.2 % (ref 0–4)
MONOCYTES RELATIVE PERCENT: 10.6 % (ref 0–4)
MONOCYTES RELATIVE PERCENT: 10.9 % (ref 0–4)
MONOCYTES RELATIVE PERCENT: 13.3 % (ref 0–4)
MONOCYTES RELATIVE PERCENT: 4.9 % (ref 0–4)
MONOCYTES RELATIVE PERCENT: 5.6 % (ref 0–4)
MONOCYTES RELATIVE PERCENT: 6 % (ref 0–4)
MUCUS: ABNORMAL HPF
MYCOPLASMA PNEUMONIAE PCR: NOT DETECTED
MYCOPLASMA PNEUMONIAE PCR: NOT DETECTED
NITRITE URINE, QUANTITATIVE: NEGATIVE
NUCLEATED RBC %: 0 %
O2 SATURATION: 90 % (ref 96–97)
OPIATES, URINE: NEGATIVE
OPIATES, URINE: NEGATIVE
OSMOLALITY URINE: 636 MOS/L (ref 292–1090)
OSMOLALITY: 299 MOS/L (ref 280–300)
OXYCODONE: NEGATIVE
OXYCODONE: NORMAL
PARAINFLUENZA 1 PCR: NOT DETECTED
PARAINFLUENZA 1 PCR: NOT DETECTED
PARAINFLUENZA 2 PCR: NOT DETECTED
PARAINFLUENZA 2 PCR: NOT DETECTED
PARAINFLUENZA 3 PCR: NOT DETECTED
PARAINFLUENZA 3 PCR: NOT DETECTED
PARAINFLUENZA 4 PCR: NOT DETECTED
PARAINFLUENZA 4 PCR: NOT DETECTED
PCO2 ARTERIAL: 31 MMHG (ref 32–45)
PCT TRANSFERRIN: 8 % (ref 10–44)
PDW BLD-RTO: 13.2 % (ref 11.7–14.9)
PDW BLD-RTO: 13.2 % (ref 11.7–14.9)
PDW BLD-RTO: 13.4 % (ref 11.7–14.9)
PDW BLD-RTO: 13.6 % (ref 11.7–14.9)
PDW BLD-RTO: 13.8 % (ref 11.7–14.9)
PDW BLD-RTO: 13.9 % (ref 11.7–14.9)
PDW BLD-RTO: 14.3 % (ref 11.7–14.9)
PDW BLD-RTO: 15.5 % (ref 11.7–14.9)
PDW BLD-RTO: 15.6 % (ref 11.7–14.9)
PDW BLD-RTO: 15.7 % (ref 11.7–14.9)
PDW BLD-RTO: 15.8 % (ref 11.7–14.9)
PDW BLD-RTO: 15.8 % (ref 11.7–14.9)
PDW BLD-RTO: 15.9 % (ref 11.7–14.9)
PDW BLD-RTO: 16.1 % (ref 11.7–14.9)
PDW BLD-RTO: 16.1 % (ref 11.7–14.9)
PDW BLD-RTO: 16.4 % (ref 11.7–14.9)
PDW BLD-RTO: 17.1 % (ref 11.7–14.9)
PDW BLD-RTO: 17.2 % (ref 11.7–14.9)
PH BLOOD: 7.43 (ref 7.34–7.45)
PH, URINE: 5 (ref 5–8)
PHENCYCLIDINE, URINE: NEGATIVE
PHENCYCLIDINE, URINE: NEGATIVE
PHOSPHORUS: 3.1 MG/DL (ref 2.5–4.9)
PHOSPHORUS: 3.4 MG/DL (ref 2.5–4.9)
PHOSPHORUS: 4.1 MG/DL (ref 2.5–4.9)
PHOSPHORUS: 4.8 MG/DL (ref 2.5–4.9)
PHOSPHORUS: 4.9 MG/DL (ref 2.5–4.9)
PHOSPHORUS: 5.9 MG/DL (ref 2.5–4.9)
PLATELET # BLD: 165 K/CU MM (ref 140–440)
PLATELET # BLD: 168 K/CU MM (ref 140–440)
PLATELET # BLD: 176 K/CU MM (ref 140–440)
PLATELET # BLD: 182 K/CU MM (ref 140–440)
PLATELET # BLD: 219 K/CU MM (ref 140–440)
PLATELET # BLD: 280 K/CU MM (ref 140–440)
PLATELET # BLD: 298 K/CU MM (ref 140–440)
PLATELET # BLD: 302 K/CU MM (ref 140–440)
PLATELET # BLD: 317 K/CU MM (ref 140–440)
PLATELET # BLD: 325 K/CU MM (ref 140–440)
PLATELET # BLD: 336 K/CU MM (ref 140–440)
PLATELET # BLD: 377 K/CU MM (ref 140–440)
PLATELET # BLD: 385 K/CU MM (ref 140–440)
PLATELET # BLD: 427 K/CU MM (ref 140–440)
PLATELET # BLD: 428 K/CU MM (ref 140–440)
PLATELET # BLD: 458 K/CU MM (ref 140–440)
PLATELET # BLD: 470 K/CU MM (ref 140–440)
PLATELET # BLD: 477 K/CU MM (ref 140–440)
PLATELET # BLD: 541 K/CU MM (ref 140–440)
PLATELET # BLD: 592 K/CU MM (ref 140–440)
PLATELET # BLD: 622 K/CU MM (ref 140–440)
PLATELET # BLD: 628 K/CU MM (ref 140–440)
PLATELET # BLD: 647 K/CU MM (ref 140–440)
PLATELET # BLD: 670 K/CU MM (ref 140–440)
PLATELET # BLD: 675 K/CU MM (ref 140–440)
PLATELET # BLD: 714 K/CU MM (ref 140–440)
PMV BLD AUTO: 10.2 FL (ref 7.5–11.1)
PMV BLD AUTO: 10.3 FL (ref 7.5–11.1)
PMV BLD AUTO: 10.3 FL (ref 7.5–11.1)
PMV BLD AUTO: 10.4 FL (ref 7.5–11.1)
PMV BLD AUTO: 10.5 FL (ref 7.5–11.1)
PMV BLD AUTO: 10.6 FL (ref 7.5–11.1)
PMV BLD AUTO: 10.7 FL (ref 7.5–11.1)
PMV BLD AUTO: 10.8 FL (ref 7.5–11.1)
PMV BLD AUTO: 8.6 FL (ref 7.5–11.1)
PMV BLD AUTO: 8.6 FL (ref 7.5–11.1)
PMV BLD AUTO: 8.8 FL (ref 7.5–11.1)
PMV BLD AUTO: 9 FL (ref 7.5–11.1)
PMV BLD AUTO: 9.1 FL (ref 7.5–11.1)
PMV BLD AUTO: 9.1 FL (ref 7.5–11.1)
PMV BLD AUTO: 9.3 FL (ref 7.5–11.1)
PMV BLD AUTO: 9.4 FL (ref 7.5–11.1)
PMV BLD AUTO: 9.5 FL (ref 7.5–11.1)
PMV BLD AUTO: 9.7 FL (ref 7.5–11.1)
PMV BLD AUTO: 9.7 FL (ref 7.5–11.1)
PMV BLD AUTO: 9.8 FL (ref 7.5–11.1)
PMV BLD AUTO: 9.9 FL (ref 7.5–11.1)
PO2 ARTERIAL: 54 MMHG (ref 75–100)
POTASSIUM SERPL-SCNC: 2.9 MMOL/L (ref 3.5–5.1)
POTASSIUM SERPL-SCNC: 3.2 MMOL/L (ref 3.5–5.1)
POTASSIUM SERPL-SCNC: 3.2 MMOL/L (ref 3.5–5.1)
POTASSIUM SERPL-SCNC: 3.3 MMOL/L (ref 3.5–5.1)
POTASSIUM SERPL-SCNC: 3.4 MMOL/L (ref 3.5–5.1)
POTASSIUM SERPL-SCNC: 3.5 MMOL/L (ref 3.5–5.1)
POTASSIUM SERPL-SCNC: 3.6 MMOL/L (ref 3.5–5.1)
POTASSIUM SERPL-SCNC: 3.7 MMOL/L (ref 3.5–5.1)
POTASSIUM SERPL-SCNC: 3.8 MMOL/L (ref 3.5–5.1)
POTASSIUM SERPL-SCNC: 3.9 MMOL/L (ref 3.5–5.1)
POTASSIUM SERPL-SCNC: 4.1 MMOL/L (ref 3.5–5.1)
POTASSIUM SERPL-SCNC: 4.1 MMOL/L (ref 3.5–5.1)
POTASSIUM SERPL-SCNC: 4.3 MMOL/L (ref 3.5–5.1)
POTASSIUM SERPL-SCNC: 4.4 MMOL/L (ref 3.5–5.1)
POTASSIUM SERPL-SCNC: 4.5 MMOL/L (ref 3.5–5.1)
POTASSIUM SERPL-SCNC: 4.6 MMOL/L (ref 3.5–5.1)
POTASSIUM SERPL-SCNC: 4.6 MMOL/L (ref 3.5–5.1)
POTASSIUM SERPL-SCNC: 5 MMOL/L (ref 3.5–5.1)
POTASSIUM SERPL-SCNC: 5.4 MMOL/L (ref 3.5–5.1)
POTASSIUM SERPL-SCNC: ABNORMAL MMOL/L (ref 3.5–5.1)
POTASSIUM SERPL-SCNC: ABNORMAL MMOL/L (ref 3.5–5.1)
POTASSIUM SERPL-SCNC: NORMAL MMOL/L (ref 3.5–5.1)
POTASSIUM SERPL-SCNC: NORMAL MMOL/L (ref 3.5–5.1)
POTASSIUM, UR: 43.5 MMOL/L (ref 22–119)
PREALBUMIN: ABNORMAL MG/DL (ref 20–40)
PRO-BNP: 2311 PG/ML
PRO-BNP: ABNORMAL PG/ML
PROCALCITONIN: 0.9
PROCALCITONIN: 1.37
PROTEIN UA: 100 MG/DL
PROTEIN UA: 100 MG/DL
PROTEIN UA: 30 MG/DL
PROTHROMBIN TIME: 12.9 SECONDS (ref 11.7–14.5)
PROTHROMBIN TIME: 18.8 SECONDS (ref 11.7–14.5)
PROTHROMBIN TIME: 30.7 SECONDS (ref 11.7–14.5)
RBC # BLD: 2.24 M/CU MM (ref 4.6–6.2)
RBC # BLD: 2.25 M/CU MM (ref 4.6–6.2)
RBC # BLD: 2.3 M/CU MM (ref 4.6–6.2)
RBC # BLD: 2.31 M/CU MM (ref 4.6–6.2)
RBC # BLD: 2.33 M/CU MM (ref 4.6–6.2)
RBC # BLD: 2.35 M/CU MM (ref 4.6–6.2)
RBC # BLD: 2.36 M/CU MM (ref 4.6–6.2)
RBC # BLD: 2.56 M/CU MM (ref 4.6–6.2)
RBC # BLD: 2.64 M/CU MM (ref 4.6–6.2)
RBC # BLD: 2.66 M/CU MM (ref 4.6–6.2)
RBC # BLD: 2.68 M/CU MM (ref 4.6–6.2)
RBC # BLD: 2.7 M/CU MM (ref 4.6–6.2)
RBC # BLD: 2.7 M/CU MM (ref 4.6–6.2)
RBC # BLD: 2.75 M/CU MM (ref 4.6–6.2)
RBC # BLD: 2.8 M/CU MM (ref 4.6–6.2)
RBC # BLD: 2.81 M/CU MM (ref 4.6–6.2)
RBC # BLD: 2.95 M/CU MM (ref 4.6–6.2)
RBC # BLD: 3.18 M/CU MM (ref 4.6–6.2)
RBC # BLD: 3.27 M/CU MM (ref 4.6–6.2)
RBC # BLD: 3.29 M/CU MM (ref 4.6–6.2)
RBC # BLD: 3.35 M/CU MM (ref 4.6–6.2)
RBC # BLD: 3.4 M/CU MM (ref 4.6–6.2)
RBC # BLD: 3.44 M/CU MM (ref 4.6–6.2)
RBC # BLD: 3.56 M/CU MM (ref 4.6–6.2)
RBC # BLD: 4.33 M/CU MM (ref 4.6–6.2)
RBC # BLD: 4.79 M/CU MM (ref 4.6–6.2)
RBC # BLD: ABNORMAL 10*6/UL
RBC URINE: 1 /HPF (ref 0–3)
RBC URINE: ABNORMAL /HPF (ref 0–3)
RBC URINE: ABNORMAL /HPF (ref 0–3)
REASON FOR REJECTION: NORMAL
REJECTED TEST: NORMAL
RHINOVIRUS ENTEROVIRUS PCR: NOT DETECTED
RHINOVIRUS ENTEROVIRUS PCR: NOT DETECTED
RSV PCR: NOT DETECTED
RSV PCR: NOT DETECTED
SEGMENTED NEUTROPHILS ABSOLUTE COUNT: 21.9 K/CU MM
SEGMENTED NEUTROPHILS ABSOLUTE COUNT: 24 K/CU MM
SEGMENTED NEUTROPHILS ABSOLUTE COUNT: 24.9 K/CU MM
SEGMENTED NEUTROPHILS ABSOLUTE COUNT: 3.3 K/CU MM
SEGMENTED NEUTROPHILS ABSOLUTE COUNT: 3.4 K/CU MM
SEGMENTED NEUTROPHILS ABSOLUTE COUNT: 5.2 K/CU MM
SEGMENTED NEUTROPHILS ABSOLUTE COUNT: 5.3 K/CU MM
SEGMENTED NEUTROPHILS RELATIVE PERCENT: 57.8 % (ref 36–66)
SEGMENTED NEUTROPHILS RELATIVE PERCENT: 61.2 % (ref 36–66)
SEGMENTED NEUTROPHILS RELATIVE PERCENT: 69 % (ref 36–66)
SEGMENTED NEUTROPHILS RELATIVE PERCENT: 73.4 % (ref 36–66)
SEGMENTED NEUTROPHILS RELATIVE PERCENT: 87 % (ref 36–66)
SEGMENTED NEUTROPHILS RELATIVE PERCENT: 90.8 % (ref 36–66)
SEGMENTED NEUTROPHILS RELATIVE PERCENT: 92.9 % (ref 36–66)
SODIUM BLD-SCNC: 122 MMOL/L (ref 135–145)
SODIUM BLD-SCNC: 123 MMOL/L (ref 135–145)
SODIUM BLD-SCNC: 124 MMOL/L (ref 135–145)
SODIUM BLD-SCNC: 124 MMOL/L (ref 135–145)
SODIUM BLD-SCNC: 125 MMOL/L (ref 135–145)
SODIUM BLD-SCNC: 126 MMOL/L (ref 135–145)
SODIUM BLD-SCNC: 127 MMOL/L (ref 135–145)
SODIUM BLD-SCNC: 128 MMOL/L (ref 135–145)
SODIUM BLD-SCNC: 130 MMOL/L (ref 135–145)
SODIUM BLD-SCNC: 131 MMOL/L (ref 135–145)
SODIUM BLD-SCNC: 131 MMOL/L (ref 135–145)
SODIUM BLD-SCNC: 132 MMOL/L (ref 135–145)
SODIUM BLD-SCNC: 133 MMOL/L (ref 135–145)
SODIUM BLD-SCNC: 134 MMOL/L (ref 135–145)
SODIUM BLD-SCNC: 134 MMOL/L (ref 135–145)
SODIUM BLD-SCNC: 135 MMOL/L (ref 135–145)
SODIUM BLD-SCNC: 135 MMOL/L (ref 135–145)
SODIUM BLD-SCNC: 136 MMOL/L (ref 135–145)
SODIUM BLD-SCNC: 136 MMOL/L (ref 135–145)
SODIUM BLD-SCNC: 137 MMOL/L (ref 135–145)
SODIUM BLD-SCNC: 138 MMOL/L (ref 135–145)
SODIUM BLD-SCNC: 139 MMOL/L (ref 135–145)
SODIUM BLD-SCNC: 139 MMOL/L (ref 135–145)
SODIUM BLD-SCNC: 140 MMOL/L (ref 135–145)
SODIUM BLD-SCNC: 140 MMOL/L (ref 135–145)
SODIUM BLD-SCNC: 141 MMOL/L (ref 135–145)
SODIUM BLD-SCNC: 143 MMOL/L (ref 135–145)
SODIUM BLD-SCNC: 144 MMOL/L (ref 135–145)
SODIUM BLD-SCNC: 145 MMOL/L (ref 135–145)
SODIUM BLD-SCNC: 147 MMOL/L (ref 135–145)
SODIUM BLD-SCNC: 150 MMOL/L (ref 135–145)
SODIUM BLD-SCNC: 151 MMOL/L (ref 135–145)
SODIUM BLD-SCNC: 151 MMOL/L (ref 135–145)
SODIUM BLD-SCNC: 153 MMOL/L (ref 135–145)
SODIUM BLD-SCNC: 155 MMOL/L (ref 135–145)
SODIUM BLD-SCNC: 156 MMOL/L (ref 135–145)
SODIUM BLD-SCNC: 157 MMOL/L (ref 135–145)
SODIUM BLD-SCNC: NORMAL MMOL/L
SODIUM BLD-SCNC: NORMAL MMOL/L
SODIUM URINE: 27 MMOL/L (ref 35–167)
SPECIFIC GRAVITY UA: 1.01 (ref 1–1.03)
SPECIFIC GRAVITY UA: 1.02 (ref 1–1.03)
SPECIFIC GRAVITY UA: 1.02 (ref 1–1.03)
SPECIMEN: NORMAL
STATUS: NORMAL
STREP PNEUMONIAE ANTIGEN: NORMAL
T4 FREE: 0.98 NG/DL (ref 0.9–1.8)
TARGET CELLS: ABNORMAL
TOTAL CK: 63 IU/L (ref 38–174)
TOTAL IMMATURE NEUTOROPHIL: 0.03 K/CU MM
TOTAL IMMATURE NEUTOROPHIL: 0.06 K/CU MM
TOTAL IMMATURE NEUTOROPHIL: 0.22 K/CU MM
TOTAL IMMATURE NEUTOROPHIL: 0.38 K/CU MM
TOTAL IRON BINDING CAPACITY: 128 UG/DL (ref 250–450)
TOTAL NUCLEATED RBC: 0 K/CU MM
TOTAL PROTEIN: 3.1 GM/DL (ref 6.4–8.2)
TOTAL PROTEIN: 4.7 GM/DL (ref 6.4–8.2)
TOTAL PROTEIN: 5.5 GM/DL (ref 6.4–8.2)
TOTAL PROTEIN: 5.8 GM/DL (ref 6.4–8.2)
TOTAL PROTEIN: 6.4 GM/DL (ref 6.4–8.2)
TOTAL PROTEIN: 6.9 GM/DL (ref 6.4–8.2)
TRANSFUSION STATUS: NORMAL
TRICHOMONAS: ABNORMAL /HPF
TRIGL SERPL-MCNC: 107 MG/DL
TRIGL SERPL-MCNC: 78 MG/DL
TROPONIN T: 0.03 NG/ML
TROPONIN T: <0.01 NG/ML
TSH HIGH SENSITIVITY: 3.57 UIU/ML (ref 0.27–4.2)
UNIT DIVISION: 0
UNIT NUMBER: NORMAL
UNSATURATED IRON BINDING CAPACITY: 118 UG/DL (ref 110–370)
UROBILINOGEN, URINE: 2 MG/DL (ref 0.2–1)
UROBILINOGEN, URINE: NORMAL MG/DL (ref 0.2–1)
UROBILINOGEN, URINE: NORMAL MG/DL (ref 0.2–1)
VANCOMYCIN RANDOM: 16.9 UG/ML
VANCOMYCIN RANDOM: 18.7 UG/ML
VANCOMYCIN RANDOM: 18.9 UG/ML
VANCOMYCIN RANDOM: 25.3 UG/ML
VANCOMYCIN RANDOM: 8.4 UG/ML
VANCOMYCIN RANDOM: 9.6 UG/ML
VANCOMYCIN RANDOM: NORMAL UG/ML
VANCOMYCIN TROUGH: 23.7 UG/ML (ref 10–20)
VITAMIN B-12: 1046 PG/ML (ref 211–911)
WBC # BLD: 10.4 K/CU MM (ref 4–10.5)
WBC # BLD: 11.8 K/CU MM (ref 4–10.5)
WBC # BLD: 12.2 K/CU MM (ref 4–10.5)
WBC # BLD: 13.6 K/CU MM (ref 4–10.5)
WBC # BLD: 13.6 K/CU MM (ref 4–10.5)
WBC # BLD: 13.7 K/CU MM (ref 4–10.5)
WBC # BLD: 14.2 K/CU MM (ref 4–10.5)
WBC # BLD: 14.2 K/CU MM (ref 4–10.5)
WBC # BLD: 15.1 K/CU MM (ref 4–10.5)
WBC # BLD: 15.6 K/CU MM (ref 4–10.5)
WBC # BLD: 16.2 K/CU MM (ref 4–10.5)
WBC # BLD: 16.6 K/CU MM (ref 4–10.5)
WBC # BLD: 17.5 K/CU MM (ref 4–10.5)
WBC # BLD: 17.6 K/CU MM (ref 4–10.5)
WBC # BLD: 18.5 K/CU MM (ref 4–10.5)
WBC # BLD: 20.9 K/CU MM (ref 4–10.5)
WBC # BLD: 24 K/CU MM (ref 4–10.5)
WBC # BLD: 26.8 K/CU MM (ref 4–10.5)
WBC # BLD: 27.7 K/CU MM (ref 4–10.5)
WBC # BLD: 4.8 K/CU MM (ref 4–10.5)
WBC # BLD: 4.9 K/CU MM (ref 4–10.5)
WBC # BLD: 5.4 K/CU MM (ref 4–10.5)
WBC # BLD: 7.1 K/CU MM (ref 4–10.5)
WBC # BLD: 8.8 K/CU MM (ref 4–10.5)
WBC # BLD: 9.7 K/CU MM (ref 4–10.5)
WBC # BLD: ABNORMAL K/CU MM (ref 4–10.5)
WBC UA: 1 /HPF (ref 0–2)
WBC UA: 3 /HPF (ref 0–2)
WBC UA: ABNORMAL /HPF (ref 0–2)
YEAST: ABNORMAL /HPF

## 2020-01-01 PROCEDURE — 2580000003 HC RX 258: Performed by: NURSE PRACTITIONER

## 2020-01-01 PROCEDURE — 85025 COMPLETE CBC W/AUTO DIFF WBC: CPT

## 2020-01-01 PROCEDURE — 2580000003 HC RX 258: Performed by: HOSPITALIST

## 2020-01-01 PROCEDURE — 74018 RADEX ABDOMEN 1 VIEW: CPT

## 2020-01-01 PROCEDURE — 6360000004 HC RX CONTRAST MEDICATION: Performed by: EMERGENCY MEDICINE

## 2020-01-01 PROCEDURE — 6360000002 HC RX W HCPCS: Performed by: NURSE PRACTITIONER

## 2020-01-01 PROCEDURE — 94761 N-INVAS EAR/PLS OXIMETRY MLT: CPT

## 2020-01-01 PROCEDURE — 6370000000 HC RX 637 (ALT 250 FOR IP): Performed by: HOSPITALIST

## 2020-01-01 PROCEDURE — 83735 ASSAY OF MAGNESIUM: CPT

## 2020-01-01 PROCEDURE — 87040 BLOOD CULTURE FOR BACTERIA: CPT

## 2020-01-01 PROCEDURE — 6370000000 HC RX 637 (ALT 250 FOR IP): Performed by: PHYSICIAN ASSISTANT

## 2020-01-01 PROCEDURE — 2140000000 HC CCU INTERMEDIATE R&B

## 2020-01-01 PROCEDURE — 71045 X-RAY EXAM CHEST 1 VIEW: CPT

## 2020-01-01 PROCEDURE — 6370000000 HC RX 637 (ALT 250 FOR IP): Performed by: FAMILY MEDICINE

## 2020-01-01 PROCEDURE — 84300 ASSAY OF URINE SODIUM: CPT

## 2020-01-01 PROCEDURE — 71275 CT ANGIOGRAPHY CHEST: CPT

## 2020-01-01 PROCEDURE — 85027 COMPLETE CBC AUTOMATED: CPT

## 2020-01-01 PROCEDURE — 02HV33Z INSERTION OF INFUSION DEVICE INTO SUPERIOR VENA CAVA, PERCUTANEOUS APPROACH: ICD-10-PCS | Performed by: HOSPITALIST

## 2020-01-01 PROCEDURE — 97530 THERAPEUTIC ACTIVITIES: CPT

## 2020-01-01 PROCEDURE — 84145 PROCALCITONIN (PCT): CPT

## 2020-01-01 PROCEDURE — G8482 FLU IMMUNIZE ORDER/ADMIN: HCPCS | Performed by: FAMILY MEDICINE

## 2020-01-01 PROCEDURE — 80051 ELECTROLYTE PANEL: CPT

## 2020-01-01 PROCEDURE — 6360000002 HC RX W HCPCS: Performed by: FAMILY MEDICINE

## 2020-01-01 PROCEDURE — 76705 ECHO EXAM OF ABDOMEN: CPT

## 2020-01-01 PROCEDURE — 80048 BASIC METABOLIC PNL TOTAL CA: CPT

## 2020-01-01 PROCEDURE — 2060000000 HC ICU INTERMEDIATE R&B

## 2020-01-01 PROCEDURE — 99255 IP/OBS CONSLTJ NEW/EST HI 80: CPT | Performed by: SURGERY

## 2020-01-01 PROCEDURE — 2580000003 HC RX 258: Performed by: EMERGENCY MEDICINE

## 2020-01-01 PROCEDURE — C9113 INJ PANTOPRAZOLE SODIUM, VIA: HCPCS | Performed by: NURSE PRACTITIONER

## 2020-01-01 PROCEDURE — 82962 GLUCOSE BLOOD TEST: CPT

## 2020-01-01 PROCEDURE — 36569 INSJ PICC 5 YR+ W/O IMAGING: CPT

## 2020-01-01 PROCEDURE — 85379 FIBRIN DEGRADATION QUANT: CPT

## 2020-01-01 PROCEDURE — G8427 DOCREV CUR MEDS BY ELIG CLIN: HCPCS | Performed by: FAMILY MEDICINE

## 2020-01-01 PROCEDURE — 2580000003 HC RX 258: Performed by: FAMILY MEDICINE

## 2020-01-01 PROCEDURE — 6370000000 HC RX 637 (ALT 250 FOR IP): Performed by: INTERNAL MEDICINE

## 2020-01-01 PROCEDURE — 80202 ASSAY OF VANCOMYCIN: CPT

## 2020-01-01 PROCEDURE — 83880 ASSAY OF NATRIURETIC PEPTIDE: CPT

## 2020-01-01 PROCEDURE — 99253 IP/OBS CNSLTJ NEW/EST LOW 45: CPT | Performed by: INTERNAL MEDICINE

## 2020-01-01 PROCEDURE — 6360000002 HC RX W HCPCS: Performed by: INTERNAL MEDICINE

## 2020-01-01 PROCEDURE — 93308 TTE F-UP OR LMTD: CPT

## 2020-01-01 PROCEDURE — 70450 CT HEAD/BRAIN W/O DYE: CPT

## 2020-01-01 PROCEDURE — 2000000000 HC ICU R&B

## 2020-01-01 PROCEDURE — 3023F SPIROM DOC REV: CPT | Performed by: FAMILY MEDICINE

## 2020-01-01 PROCEDURE — 93010 ELECTROCARDIOGRAM REPORT: CPT | Performed by: INTERNAL MEDICINE

## 2020-01-01 PROCEDURE — 2500000003 HC RX 250 WO HCPCS: Performed by: NURSE PRACTITIONER

## 2020-01-01 PROCEDURE — 82550 ASSAY OF CK (CPK): CPT

## 2020-01-01 PROCEDURE — 2580000003 HC RX 258

## 2020-01-01 PROCEDURE — 2580000003 HC RX 258: Performed by: INTERNAL MEDICINE

## 2020-01-01 PROCEDURE — 6370000000 HC RX 637 (ALT 250 FOR IP): Performed by: NURSE PRACTITIONER

## 2020-01-01 PROCEDURE — 3017F COLORECTAL CA SCREEN DOC REV: CPT | Performed by: FAMILY MEDICINE

## 2020-01-01 PROCEDURE — 80061 LIPID PANEL: CPT

## 2020-01-01 PROCEDURE — 2500000003 HC RX 250 WO HCPCS: Performed by: HOSPITALIST

## 2020-01-01 PROCEDURE — 78452 HT MUSCLE IMAGE SPECT MULT: CPT

## 2020-01-01 PROCEDURE — 94640 AIRWAY INHALATION TREATMENT: CPT

## 2020-01-01 PROCEDURE — 6360000002 HC RX W HCPCS: Performed by: HOSPITALIST

## 2020-01-01 PROCEDURE — C1729 CATH, DRAINAGE: HCPCS

## 2020-01-01 PROCEDURE — A9540 TC99M MAA: HCPCS | Performed by: HOSPITALIST

## 2020-01-01 PROCEDURE — 97116 GAIT TRAINING THERAPY: CPT

## 2020-01-01 PROCEDURE — 36592 COLLECT BLOOD FROM PICC: CPT

## 2020-01-01 PROCEDURE — 99211 OFF/OP EST MAY X REQ PHY/QHP: CPT

## 2020-01-01 PROCEDURE — 71046 X-RAY EXAM CHEST 2 VIEWS: CPT

## 2020-01-01 PROCEDURE — 6360000002 HC RX W HCPCS

## 2020-01-01 PROCEDURE — 83690 ASSAY OF LIPASE: CPT

## 2020-01-01 PROCEDURE — 93005 ELECTROCARDIOGRAM TRACING: CPT | Performed by: EMERGENCY MEDICINE

## 2020-01-01 PROCEDURE — 36415 COLL VENOUS BLD VENIPUNCTURE: CPT

## 2020-01-01 PROCEDURE — 84100 ASSAY OF PHOSPHORUS: CPT

## 2020-01-01 PROCEDURE — 97535 SELF CARE MNGMENT TRAINING: CPT

## 2020-01-01 PROCEDURE — 93306 TTE W/DOPPLER COMPLETE: CPT

## 2020-01-01 PROCEDURE — 93017 CV STRESS TEST TRACING ONLY: CPT

## 2020-01-01 PROCEDURE — 82010 KETONE BODYS QUAN: CPT

## 2020-01-01 PROCEDURE — 1200000000 HC SEMI PRIVATE

## 2020-01-01 PROCEDURE — 85610 PROTHROMBIN TIME: CPT

## 2020-01-01 PROCEDURE — 0W993ZZ DRAINAGE OF RIGHT PLEURAL CAVITY, PERCUTANEOUS APPROACH: ICD-10-PCS | Performed by: RADIOLOGY

## 2020-01-01 PROCEDURE — 87633 RESP VIRUS 12-25 TARGETS: CPT

## 2020-01-01 PROCEDURE — 84132 ASSAY OF SERUM POTASSIUM: CPT

## 2020-01-01 PROCEDURE — 1036F TOBACCO NON-USER: CPT | Performed by: FAMILY MEDICINE

## 2020-01-01 PROCEDURE — 99232 SBSQ HOSP IP/OBS MODERATE 35: CPT | Performed by: SURGERY

## 2020-01-01 PROCEDURE — 87486 CHLMYD PNEUM DNA AMP PROBE: CPT

## 2020-01-01 PROCEDURE — 84478 ASSAY OF TRIGLYCERIDES: CPT

## 2020-01-01 PROCEDURE — 99253 IP/OBS CNSLTJ NEW/EST LOW 45: CPT | Performed by: SURGERY

## 2020-01-01 PROCEDURE — 83605 ASSAY OF LACTIC ACID: CPT

## 2020-01-01 PROCEDURE — 3430000000 HC RX DIAGNOSTIC RADIOPHARMACEUTICAL: Performed by: HOSPITALIST

## 2020-01-01 PROCEDURE — C1751 CATH, INF, PER/CENT/MIDLINE: HCPCS

## 2020-01-01 PROCEDURE — 82746 ASSAY OF FOLIC ACID SERUM: CPT

## 2020-01-01 PROCEDURE — 80307 DRUG TEST PRSMV CHEM ANLYZR: CPT

## 2020-01-01 PROCEDURE — 2700000000 HC OXYGEN THERAPY PER DAY

## 2020-01-01 PROCEDURE — 94664 DEMO&/EVAL PT USE INHALER: CPT

## 2020-01-01 PROCEDURE — G8420 CALC BMI NORM PARAMETERS: HCPCS | Performed by: FAMILY MEDICINE

## 2020-01-01 PROCEDURE — 80053 COMPREHEN METABOLIC PANEL: CPT

## 2020-01-01 PROCEDURE — 2500000003 HC RX 250 WO HCPCS: Performed by: INTERNAL MEDICINE

## 2020-01-01 PROCEDURE — 36430 TRANSFUSION BLD/BLD COMPNT: CPT

## 2020-01-01 PROCEDURE — 84134 ASSAY OF PREALBUMIN: CPT

## 2020-01-01 PROCEDURE — 87798 DETECT AGENT NOS DNA AMP: CPT

## 2020-01-01 PROCEDURE — 82272 OCCULT BLD FECES 1-3 TESTS: CPT

## 2020-01-01 PROCEDURE — 87899 AGENT NOS ASSAY W/OPTIC: CPT

## 2020-01-01 PROCEDURE — 97162 PT EVAL MOD COMPLEX 30 MIN: CPT

## 2020-01-01 PROCEDURE — 84484 ASSAY OF TROPONIN QUANT: CPT

## 2020-01-01 PROCEDURE — 97110 THERAPEUTIC EXERCISES: CPT

## 2020-01-01 PROCEDURE — 85730 THROMBOPLASTIN TIME PARTIAL: CPT

## 2020-01-01 PROCEDURE — 99285 EMERGENCY DEPT VISIT HI MDM: CPT

## 2020-01-01 PROCEDURE — 76775 US EXAM ABDO BACK WALL LIM: CPT

## 2020-01-01 PROCEDURE — 99222 1ST HOSP IP/OBS MODERATE 55: CPT | Performed by: INTERNAL MEDICINE

## 2020-01-01 PROCEDURE — 93970 EXTREMITY STUDY: CPT

## 2020-01-01 PROCEDURE — 84443 ASSAY THYROID STIM HORMONE: CPT

## 2020-01-01 PROCEDURE — 6370000000 HC RX 637 (ALT 250 FOR IP): Performed by: SURGERY

## 2020-01-01 PROCEDURE — 83550 IRON BINDING TEST: CPT

## 2020-01-01 PROCEDURE — 96372 THER/PROPH/DIAG INJ SC/IM: CPT | Performed by: FAMILY MEDICINE

## 2020-01-01 PROCEDURE — 85018 HEMOGLOBIN: CPT

## 2020-01-01 PROCEDURE — 86901 BLOOD TYPING SEROLOGIC RH(D): CPT

## 2020-01-01 PROCEDURE — 93005 ELECTROCARDIOGRAM TRACING: CPT | Performed by: FAMILY MEDICINE

## 2020-01-01 PROCEDURE — G8926 SPIRO NO PERF OR DOC: HCPCS | Performed by: FAMILY MEDICINE

## 2020-01-01 PROCEDURE — G0328 FECAL BLOOD SCRN IMMUNOASSAY: HCPCS

## 2020-01-01 PROCEDURE — 86900 BLOOD TYPING SEROLOGIC ABO: CPT

## 2020-01-01 PROCEDURE — 90686 IIV4 VACC NO PRSV 0.5 ML IM: CPT | Performed by: FAMILY MEDICINE

## 2020-01-01 PROCEDURE — 2709999900 HC NON-CHARGEABLE SUPPLY

## 2020-01-01 PROCEDURE — 99215 OFFICE O/P EST HI 40 MIN: CPT | Performed by: FAMILY MEDICINE

## 2020-01-01 PROCEDURE — 71250 CT THORAX DX C-: CPT

## 2020-01-01 PROCEDURE — 82803 BLOOD GASES ANY COMBINATION: CPT

## 2020-01-01 PROCEDURE — 83540 ASSAY OF IRON: CPT

## 2020-01-01 PROCEDURE — 99213 OFFICE O/P EST LOW 20 MIN: CPT | Performed by: FAMILY MEDICINE

## 2020-01-01 PROCEDURE — 6360000002 HC RX W HCPCS: Performed by: EMERGENCY MEDICINE

## 2020-01-01 PROCEDURE — 80076 HEPATIC FUNCTION PANEL: CPT

## 2020-01-01 PROCEDURE — 87324 CLOSTRIDIUM AG IA: CPT

## 2020-01-01 PROCEDURE — 81001 URINALYSIS AUTO W/SCOPE: CPT

## 2020-01-01 PROCEDURE — 86922 COMPATIBILITY TEST ANTIGLOB: CPT

## 2020-01-01 PROCEDURE — 99291 CRITICAL CARE FIRST HOUR: CPT

## 2020-01-01 PROCEDURE — 82607 VITAMIN B-12: CPT

## 2020-01-01 PROCEDURE — 83935 ASSAY OF URINE OSMOLALITY: CPT

## 2020-01-01 PROCEDURE — 99232 SBSQ HOSP IP/OBS MODERATE 35: CPT | Performed by: INTERNAL MEDICINE

## 2020-01-01 PROCEDURE — 32555 ASPIRATE PLEURA W/ IMAGING: CPT

## 2020-01-01 PROCEDURE — 97161 PT EVAL LOW COMPLEX 20 MIN: CPT

## 2020-01-01 PROCEDURE — 83721 ASSAY OF BLOOD LIPOPROTEIN: CPT

## 2020-01-01 PROCEDURE — 84133 ASSAY OF URINE POTASSIUM: CPT

## 2020-01-01 PROCEDURE — 83930 ASSAY OF BLOOD OSMOLALITY: CPT

## 2020-01-01 PROCEDURE — 0D9670Z DRAINAGE OF STOMACH WITH DRAINAGE DEVICE, VIA NATURAL OR ARTIFICIAL OPENING: ICD-10-PCS | Performed by: SURGERY

## 2020-01-01 PROCEDURE — 99214 OFFICE O/P EST MOD 30 MIN: CPT | Performed by: FAMILY MEDICINE

## 2020-01-01 PROCEDURE — 85014 HEMATOCRIT: CPT

## 2020-01-01 PROCEDURE — 86850 RBC ANTIBODY SCREEN: CPT

## 2020-01-01 PROCEDURE — 2580000003 HC RX 258: Performed by: PHYSICIAN ASSISTANT

## 2020-01-01 PROCEDURE — 0W9B3ZZ DRAINAGE OF LEFT PLEURAL CAVITY, PERCUTANEOUS APPROACH: ICD-10-PCS | Performed by: RADIOLOGY

## 2020-01-01 PROCEDURE — 90471 IMMUNIZATION ADMIN: CPT | Performed by: FAMILY MEDICINE

## 2020-01-01 PROCEDURE — 74176 CT ABD & PELVIS W/O CONTRAST: CPT

## 2020-01-01 PROCEDURE — 93925 LOWER EXTREMITY STUDY: CPT

## 2020-01-01 PROCEDURE — 82436 ASSAY OF URINE CHLORIDE: CPT

## 2020-01-01 PROCEDURE — C9113 INJ PANTOPRAZOLE SODIUM, VIA: HCPCS

## 2020-01-01 PROCEDURE — 85007 BL SMEAR W/DIFF WBC COUNT: CPT

## 2020-01-01 PROCEDURE — A9500 TC99M SESTAMIBI: HCPCS | Performed by: INTERNAL MEDICINE

## 2020-01-01 PROCEDURE — 3430000000 HC RX DIAGNOSTIC RADIOPHARMACEUTICAL: Performed by: INTERNAL MEDICINE

## 2020-01-01 PROCEDURE — 87581 M.PNEUMON DNA AMP PROBE: CPT

## 2020-01-01 PROCEDURE — 96374 THER/PROPH/DIAG INJ IV PUSH: CPT

## 2020-01-01 PROCEDURE — 6360000002 HC RX W HCPCS: Performed by: PHYSICIAN ASSISTANT

## 2020-01-01 PROCEDURE — 83036 HEMOGLOBIN GLYCOSYLATED A1C: CPT

## 2020-01-01 PROCEDURE — 82248 BILIRUBIN DIRECT: CPT

## 2020-01-01 PROCEDURE — 87449 NOS EACH ORGANISM AG IA: CPT

## 2020-01-01 PROCEDURE — 78580 LUNG PERFUSION IMAGING: CPT

## 2020-01-01 PROCEDURE — P9016 RBC LEUKOCYTES REDUCED: HCPCS

## 2020-01-01 PROCEDURE — 76937 US GUIDE VASCULAR ACCESS: CPT

## 2020-01-01 PROCEDURE — 97165 OT EVAL LOW COMPLEX 30 MIN: CPT

## 2020-01-01 PROCEDURE — 97166 OT EVAL MOD COMPLEX 45 MIN: CPT

## 2020-01-01 PROCEDURE — 73630 X-RAY EXAM OF FOOT: CPT

## 2020-01-01 PROCEDURE — 84439 ASSAY OF FREE THYROXINE: CPT

## 2020-01-01 RX ORDER — LORAZEPAM 1 MG/1
1 TABLET ORAL
Status: DISCONTINUED | OUTPATIENT
Start: 2020-01-01 | End: 2020-01-01 | Stop reason: HOSPADM

## 2020-01-01 RX ORDER — CARVEDILOL 25 MG/1
25 TABLET ORAL 2 TIMES DAILY WITH MEALS
Status: DISCONTINUED | OUTPATIENT
Start: 2020-01-01 | End: 2020-01-01 | Stop reason: HOSPADM

## 2020-01-01 RX ORDER — POTASSIUM CHLORIDE 7.45 MG/ML
10 INJECTION INTRAVENOUS PRN
Status: DISCONTINUED | OUTPATIENT
Start: 2020-01-01 | End: 2020-01-01 | Stop reason: HOSPADM

## 2020-01-01 RX ORDER — PAROXETINE HYDROCHLORIDE 20 MG/1
20 TABLET, FILM COATED ORAL DAILY
Status: DISCONTINUED | OUTPATIENT
Start: 2020-01-01 | End: 2020-01-01 | Stop reason: HOSPADM

## 2020-01-01 RX ORDER — SODIUM CHLORIDE 9 MG/ML
INJECTION, SOLUTION INTRAVENOUS CONTINUOUS
Status: DISCONTINUED | OUTPATIENT
Start: 2020-01-01 | End: 2020-01-01

## 2020-01-01 RX ORDER — DEXTROSE MONOHYDRATE 25 G/50ML
12.5 INJECTION, SOLUTION INTRAVENOUS PRN
Status: DISCONTINUED | OUTPATIENT
Start: 2020-01-01 | End: 2020-01-01 | Stop reason: HOSPADM

## 2020-01-01 RX ORDER — ALBUTEROL SULFATE 90 UG/1
2 AEROSOL, METERED RESPIRATORY (INHALATION) EVERY 6 HOURS PRN
Status: DISCONTINUED | OUTPATIENT
Start: 2020-01-01 | End: 2020-01-01

## 2020-01-01 RX ORDER — SODIUM CHLORIDE 0.9 % (FLUSH) 0.9 %
10 SYRINGE (ML) INJECTION EVERY 12 HOURS SCHEDULED
Status: DISCONTINUED | OUTPATIENT
Start: 2020-01-01 | End: 2020-01-01 | Stop reason: HOSPADM

## 2020-01-01 RX ORDER — PANTOPRAZOLE SODIUM 40 MG/1
40 TABLET, DELAYED RELEASE ORAL DAILY PRN
Status: DISCONTINUED | OUTPATIENT
Start: 2020-01-01 | End: 2020-01-01 | Stop reason: HOSPADM

## 2020-01-01 RX ORDER — LISINOPRIL 20 MG/1
20 TABLET ORAL DAILY
Status: DISCONTINUED | OUTPATIENT
Start: 2020-01-01 | End: 2020-01-01 | Stop reason: HOSPADM

## 2020-01-01 RX ORDER — DOXYCYCLINE HYCLATE 100 MG
100 TABLET ORAL 2 TIMES DAILY
Qty: 20 TABLET | Refills: 0 | OUTPATIENT
Start: 2020-01-01 | End: 2020-01-01

## 2020-01-01 RX ORDER — AMLODIPINE BESYLATE 5 MG/1
5 TABLET ORAL DAILY
Status: DISCONTINUED | OUTPATIENT
Start: 2020-01-01 | End: 2020-01-01

## 2020-01-01 RX ORDER — LISINOPRIL 10 MG/1
10 TABLET ORAL DAILY
Qty: 30 TABLET | Refills: 2 | Status: SHIPPED | OUTPATIENT
Start: 2020-01-01 | End: 2020-01-01 | Stop reason: SDUPTHER

## 2020-01-01 RX ORDER — CARVEDILOL 25 MG/1
25 TABLET ORAL 2 TIMES DAILY WITH MEALS
Qty: 60 TABLET | Refills: 3 | Status: SHIPPED | OUTPATIENT
Start: 2020-01-01

## 2020-01-01 RX ORDER — GABAPENTIN 300 MG/1
300 CAPSULE ORAL 3 TIMES DAILY
Qty: 90 CAPSULE | Refills: 0 | Status: SHIPPED
Start: 2020-01-01 | End: 2020-01-01 | Stop reason: DRUGHIGH

## 2020-01-01 RX ORDER — HYDRALAZINE HYDROCHLORIDE 50 MG/1
50 TABLET, FILM COATED ORAL EVERY 8 HOURS SCHEDULED
Qty: 90 TABLET | Refills: 3 | Status: SHIPPED | OUTPATIENT
Start: 2020-01-01

## 2020-01-01 RX ORDER — POTASSIUM CHLORIDE 20 MEQ/1
40 TABLET, EXTENDED RELEASE ORAL ONCE
Status: COMPLETED | OUTPATIENT
Start: 2020-01-01 | End: 2020-01-01

## 2020-01-01 RX ORDER — SODIUM BICARBONATE 650 MG/1
650 TABLET ORAL 3 TIMES DAILY
Status: DISCONTINUED | OUTPATIENT
Start: 2020-01-01 | End: 2020-01-01

## 2020-01-01 RX ORDER — HYDRALAZINE HYDROCHLORIDE 50 MG/1
50 TABLET, FILM COATED ORAL EVERY 8 HOURS SCHEDULED
Status: DISCONTINUED | OUTPATIENT
Start: 2020-01-01 | End: 2020-01-01 | Stop reason: HOSPADM

## 2020-01-01 RX ORDER — GABAPENTIN 600 MG/1
600 TABLET ORAL 2 TIMES DAILY
Qty: 60 TABLET | Refills: 5 | Status: SHIPPED | OUTPATIENT
Start: 2020-01-01 | End: 2020-01-01

## 2020-01-01 RX ORDER — POLYETHYLENE GLYCOL 3350 17 G/17G
17 POWDER, FOR SOLUTION ORAL DAILY PRN
Status: DISCONTINUED | OUTPATIENT
Start: 2020-01-01 | End: 2020-01-01 | Stop reason: HOSPADM

## 2020-01-01 RX ORDER — LORAZEPAM 2 MG/ML
1 INJECTION INTRAMUSCULAR
Status: DISCONTINUED | OUTPATIENT
Start: 2020-01-01 | End: 2020-01-01

## 2020-01-01 RX ORDER — SODIUM CHLORIDE 0.9 % (FLUSH) 0.9 %
10 SYRINGE (ML) INJECTION EVERY 12 HOURS SCHEDULED
Status: DISCONTINUED | OUTPATIENT
Start: 2020-01-01 | End: 2020-01-01

## 2020-01-01 RX ORDER — NICOTINE POLACRILEX 4 MG
15 LOZENGE BUCCAL PRN
Status: DISCONTINUED | OUTPATIENT
Start: 2020-01-01 | End: 2020-01-01 | Stop reason: SDUPTHER

## 2020-01-01 RX ORDER — MAGNESIUM SULFATE IN WATER 40 MG/ML
2 INJECTION, SOLUTION INTRAVENOUS ONCE
Status: COMPLETED | OUTPATIENT
Start: 2020-01-01 | End: 2020-01-01

## 2020-01-01 RX ORDER — PHENOBARBITAL SODIUM 65 MG/ML
65 INJECTION INTRAMUSCULAR EVERY 8 HOURS SCHEDULED
Status: COMPLETED | OUTPATIENT
Start: 2020-01-01 | End: 2020-01-01

## 2020-01-01 RX ORDER — CEFTRIAXONE SODIUM 250 MG/1
250 INJECTION, POWDER, FOR SOLUTION INTRAMUSCULAR; INTRAVENOUS ONCE
Status: COMPLETED | OUTPATIENT
Start: 2020-01-01 | End: 2020-01-01

## 2020-01-01 RX ORDER — MAGNESIUM SULFATE 4 G/50ML
4 INJECTION INTRAVENOUS ONCE
Status: COMPLETED | OUTPATIENT
Start: 2020-01-01 | End: 2020-01-01

## 2020-01-01 RX ORDER — MULTIVITAMIN WITH FOLIC ACID 400 MCG
1 TABLET ORAL DAILY
Status: DISCONTINUED | OUTPATIENT
Start: 2020-01-01 | End: 2020-01-01

## 2020-01-01 RX ORDER — CEFDINIR 300 MG/1
300 CAPSULE ORAL EVERY 12 HOURS SCHEDULED
Status: COMPLETED | OUTPATIENT
Start: 2020-01-01 | End: 2020-01-01

## 2020-01-01 RX ORDER — LORAZEPAM 1 MG/1
1 TABLET ORAL
Status: DISCONTINUED | OUTPATIENT
Start: 2020-01-01 | End: 2020-01-01

## 2020-01-01 RX ORDER — PAROXETINE HYDROCHLORIDE 20 MG/1
20 TABLET, FILM COATED ORAL DAILY
Qty: 30 TABLET | Refills: 0 | Status: SHIPPED | OUTPATIENT
Start: 2020-01-01 | End: 2020-01-01 | Stop reason: SDUPTHER

## 2020-01-01 RX ORDER — LORAZEPAM 2 MG/ML
4 INJECTION INTRAMUSCULAR
Status: DISCONTINUED | OUTPATIENT
Start: 2020-01-01 | End: 2020-01-01

## 2020-01-01 RX ORDER — LORAZEPAM 1 MG/1
3 TABLET ORAL
Status: DISCONTINUED | OUTPATIENT
Start: 2020-01-01 | End: 2020-01-01

## 2020-01-01 RX ORDER — HYDROXYZINE HYDROCHLORIDE 25 MG/1
1 TABLET, FILM COATED ORAL 2 TIMES DAILY
COMMUNITY
Start: 2020-01-01 | End: 2020-01-01 | Stop reason: DRUGHIGH

## 2020-01-01 RX ORDER — LANOLIN ALCOHOL/MO/W.PET/CERES
100 CREAM (GRAM) TOPICAL DAILY
Qty: 30 TABLET | Refills: 0 | Status: SHIPPED | OUTPATIENT
Start: 2020-01-01

## 2020-01-01 RX ORDER — ACETAMINOPHEN 650 MG/1
650 SUPPOSITORY RECTAL EVERY 6 HOURS PRN
Status: CANCELLED | OUTPATIENT
Start: 2020-01-01

## 2020-01-01 RX ORDER — LANOLIN ALCOHOL/MO/W.PET/CERES
100 CREAM (GRAM) TOPICAL DAILY
Qty: 30 TABLET | Refills: 3 | Status: ON HOLD | OUTPATIENT
Start: 2020-01-01 | End: 2020-01-01 | Stop reason: HOSPADM

## 2020-01-01 RX ORDER — SODIUM CHLORIDE 9 MG/ML
1000 INJECTION, SOLUTION INTRAVENOUS CONTINUOUS
Status: DISCONTINUED | OUTPATIENT
Start: 2020-01-01 | End: 2020-01-01

## 2020-01-01 RX ORDER — SODIUM BICARBONATE 650 MG/1
1300 TABLET ORAL 3 TIMES DAILY
Status: DISCONTINUED | OUTPATIENT
Start: 2020-01-01 | End: 2020-01-01

## 2020-01-01 RX ORDER — LORAZEPAM 2 MG/1
2 TABLET ORAL 2 TIMES DAILY
COMMUNITY
End: 2020-01-01 | Stop reason: ALTCHOICE

## 2020-01-01 RX ORDER — LORAZEPAM 1 MG/1
4 TABLET ORAL
Status: DISCONTINUED | OUTPATIENT
Start: 2020-01-01 | End: 2020-01-01

## 2020-01-01 RX ORDER — PANTOPRAZOLE SODIUM 40 MG/10ML
40 INJECTION, POWDER, LYOPHILIZED, FOR SOLUTION INTRAVENOUS DAILY
Status: DISCONTINUED | OUTPATIENT
Start: 2020-01-01 | End: 2020-01-01

## 2020-01-01 RX ORDER — PAROXETINE HYDROCHLORIDE 20 MG/1
20 TABLET, FILM COATED ORAL DAILY
Qty: 30 TABLET | Refills: 5 | Status: SHIPPED | OUTPATIENT
Start: 2020-01-01

## 2020-01-01 RX ORDER — THIAMINE HYDROCHLORIDE 100 MG/ML
100 INJECTION, SOLUTION INTRAMUSCULAR; INTRAVENOUS DAILY
Status: DISCONTINUED | OUTPATIENT
Start: 2020-01-01 | End: 2020-01-01

## 2020-01-01 RX ORDER — AMLODIPINE BESYLATE 10 MG/1
10 TABLET ORAL DAILY
Status: DISCONTINUED | OUTPATIENT
Start: 2020-01-01 | End: 2020-01-01 | Stop reason: HOSPADM

## 2020-01-01 RX ORDER — FOLIC ACID 1 MG/1
1 TABLET ORAL DAILY
Status: DISCONTINUED | OUTPATIENT
Start: 2020-01-01 | End: 2020-01-01 | Stop reason: HOSPADM

## 2020-01-01 RX ORDER — FUROSEMIDE 10 MG/ML
40 INJECTION INTRAMUSCULAR; INTRAVENOUS ONCE
Status: COMPLETED | OUTPATIENT
Start: 2020-01-01 | End: 2020-01-01

## 2020-01-01 RX ORDER — ALBUMIN, HUMAN INJ 5% 5 %
SOLUTION INTRAVENOUS
Status: DISPENSED
Start: 2020-01-01 | End: 2020-01-01

## 2020-01-01 RX ORDER — POTASSIUM CHLORIDE AND SODIUM CHLORIDE 900; 300 MG/100ML; MG/100ML
INJECTION, SOLUTION INTRAVENOUS CONTINUOUS
Status: DISCONTINUED | OUTPATIENT
Start: 2020-01-01 | End: 2020-01-01

## 2020-01-01 RX ORDER — TRAMADOL HYDROCHLORIDE 50 MG/1
50 TABLET ORAL 2 TIMES DAILY
COMMUNITY
End: 2020-01-01

## 2020-01-01 RX ORDER — AMOXICILLIN AND CLAVULANATE POTASSIUM 875; 125 MG/1; MG/1
1 TABLET, FILM COATED ORAL 2 TIMES DAILY
Qty: 20 TABLET | Refills: 0 | Status: SHIPPED | OUTPATIENT
Start: 2020-01-01 | End: 2020-01-01

## 2020-01-01 RX ORDER — ACETAMINOPHEN 325 MG/1
650 TABLET ORAL EVERY 4 HOURS PRN
Status: DISCONTINUED | OUTPATIENT
Start: 2020-01-01 | End: 2020-01-01 | Stop reason: HOSPADM

## 2020-01-01 RX ORDER — 0.9 % SODIUM CHLORIDE 0.9 %
20 INTRAVENOUS SOLUTION INTRAVENOUS ONCE
Status: COMPLETED | OUTPATIENT
Start: 2020-01-01 | End: 2020-01-01

## 2020-01-01 RX ORDER — HYDROXYZINE HYDROCHLORIDE 25 MG/1
25 TABLET, FILM COATED ORAL 2 TIMES DAILY
COMMUNITY
End: 2020-01-01 | Stop reason: SDUPTHER

## 2020-01-01 RX ORDER — LANOLIN ALCOHOL/MO/W.PET/CERES
3 CREAM (GRAM) TOPICAL ONCE
Status: COMPLETED | OUTPATIENT
Start: 2020-01-01 | End: 2020-01-01

## 2020-01-01 RX ORDER — POTASSIUM CHLORIDE 20 MEQ/1
20 TABLET, EXTENDED RELEASE ORAL ONCE
Status: COMPLETED | OUTPATIENT
Start: 2020-01-01 | End: 2020-01-01

## 2020-01-01 RX ORDER — SODIUM CHLORIDE 0.9 % (FLUSH) 0.9 %
10 SYRINGE (ML) INJECTION PRN
Status: DISCONTINUED | OUTPATIENT
Start: 2020-01-01 | End: 2020-01-01 | Stop reason: HOSPADM

## 2020-01-01 RX ORDER — M-VIT,TX,IRON,MINS/CALC/FOLIC 27MG-0.4MG
1 TABLET ORAL DAILY
Status: DISCONTINUED | OUTPATIENT
Start: 2020-01-01 | End: 2020-01-01 | Stop reason: HOSPADM

## 2020-01-01 RX ORDER — DEXTROSE MONOHYDRATE 25 G/50ML
12.5 INJECTION, SOLUTION INTRAVENOUS PRN
Status: DISCONTINUED | OUTPATIENT
Start: 2020-01-01 | End: 2020-01-01 | Stop reason: SDUPTHER

## 2020-01-01 RX ORDER — THIAMINE MONONITRATE (VIT B1) 100 MG
100 TABLET ORAL DAILY
Status: DISCONTINUED | OUTPATIENT
Start: 2020-01-01 | End: 2020-01-01 | Stop reason: HOSPADM

## 2020-01-01 RX ORDER — LINEZOLID 2 MG/ML
600 INJECTION, SOLUTION INTRAVENOUS EVERY 12 HOURS
Status: DISCONTINUED | OUTPATIENT
Start: 2020-01-01 | End: 2020-01-01

## 2020-01-01 RX ORDER — LORAZEPAM 2 MG/ML
2 INJECTION INTRAMUSCULAR
Status: DISCONTINUED | OUTPATIENT
Start: 2020-01-01 | End: 2020-01-01

## 2020-01-01 RX ORDER — SODIUM CHLORIDE 0.9 % (FLUSH) 0.9 %
10 SYRINGE (ML) INJECTION PRN
Status: DISCONTINUED | OUTPATIENT
Start: 2020-01-01 | End: 2020-01-01

## 2020-01-01 RX ORDER — GABAPENTIN 300 MG/1
600 CAPSULE ORAL 2 TIMES DAILY
Status: DISCONTINUED | OUTPATIENT
Start: 2020-01-01 | End: 2020-01-01 | Stop reason: HOSPADM

## 2020-01-01 RX ORDER — LORAZEPAM 2 MG/ML
1 INJECTION INTRAMUSCULAR ONCE
Status: COMPLETED | OUTPATIENT
Start: 2020-01-01 | End: 2020-01-01

## 2020-01-01 RX ORDER — LORAZEPAM 2 MG/ML
3 INJECTION INTRAMUSCULAR
Status: DISCONTINUED | OUTPATIENT
Start: 2020-01-01 | End: 2020-01-01

## 2020-01-01 RX ORDER — TRAMADOL HYDROCHLORIDE 50 MG/1
50 TABLET ORAL EVERY 8 HOURS PRN
Qty: 30 TABLET | Refills: 0 | Status: SHIPPED | OUTPATIENT
Start: 2020-01-01 | End: 2020-01-01

## 2020-01-01 RX ORDER — HYDROXYZINE 50 MG/1
50 TABLET, FILM COATED ORAL 3 TIMES DAILY PRN
COMMUNITY
End: 2020-01-01 | Stop reason: DRUGHIGH

## 2020-01-01 RX ORDER — DEXTROSE MONOHYDRATE 50 MG/ML
INJECTION, SOLUTION INTRAVENOUS CONTINUOUS
Status: DISCONTINUED | OUTPATIENT
Start: 2020-01-01 | End: 2020-01-01

## 2020-01-01 RX ORDER — DEXTROSE MONOHYDRATE 50 MG/ML
INJECTION, SOLUTION INTRAVENOUS
Status: COMPLETED
Start: 2020-01-01 | End: 2020-01-01

## 2020-01-01 RX ORDER — TRAMADOL HYDROCHLORIDE 50 MG/1
50 TABLET ORAL EVERY 8 HOURS PRN
Qty: 15 TABLET | Refills: 0 | Status: SHIPPED | OUTPATIENT
Start: 2020-01-01 | End: 2020-01-01

## 2020-01-01 RX ORDER — DEXTROSE MONOHYDRATE 25 G/50ML
25 INJECTION, SOLUTION INTRAVENOUS ONCE
Status: COMPLETED | OUTPATIENT
Start: 2020-01-01 | End: 2020-01-01

## 2020-01-01 RX ORDER — MAGNESIUM SULFATE 1 G/100ML
1 INJECTION INTRAVENOUS PRN
Status: DISCONTINUED | OUTPATIENT
Start: 2020-01-01 | End: 2020-01-01 | Stop reason: HOSPADM

## 2020-01-01 RX ORDER — METOPROLOL TARTRATE 50 MG/1
50 TABLET, FILM COATED ORAL 2 TIMES DAILY
Status: DISCONTINUED | OUTPATIENT
Start: 2020-01-01 | End: 2020-01-01

## 2020-01-01 RX ORDER — TRISODIUM CITRATE DIHYDRATE AND CITRIC ACID MONOHYDRATE 500; 334 MG/5ML; MG/5ML
30 SOLUTION ORAL 4 TIMES DAILY
Status: DISCONTINUED | OUTPATIENT
Start: 2020-01-01 | End: 2020-01-01

## 2020-01-01 RX ORDER — FERROUS SULFATE 325(65) MG
325 TABLET ORAL 2 TIMES DAILY WITH MEALS
Qty: 30 TABLET | Refills: 3 | Status: SHIPPED | OUTPATIENT
Start: 2020-01-01

## 2020-01-01 RX ORDER — 0.9 % SODIUM CHLORIDE 0.9 %
1000 INTRAVENOUS SOLUTION INTRAVENOUS ONCE
Status: COMPLETED | OUTPATIENT
Start: 2020-01-01 | End: 2020-01-01

## 2020-01-01 RX ORDER — LISINOPRIL 10 MG/1
10 TABLET ORAL DAILY
Qty: 30 TABLET | Refills: 5 | Status: ON HOLD | OUTPATIENT
Start: 2020-01-01 | End: 2020-01-01 | Stop reason: HOSPADM

## 2020-01-01 RX ORDER — LORAZEPAM 1 MG/1
1 TABLET ORAL EVERY 8 HOURS PRN
Status: DISCONTINUED | OUTPATIENT
Start: 2020-01-01 | End: 2020-01-01 | Stop reason: HOSPADM

## 2020-01-01 RX ORDER — METOPROLOL TARTRATE 50 MG/1
100 TABLET, FILM COATED ORAL 2 TIMES DAILY
Status: DISCONTINUED | OUTPATIENT
Start: 2020-01-01 | End: 2020-01-01

## 2020-01-01 RX ORDER — POTASSIUM CHLORIDE 20 MEQ/1
20 TABLET, EXTENDED RELEASE ORAL
Status: COMPLETED | OUTPATIENT
Start: 2020-01-01 | End: 2020-01-01

## 2020-01-01 RX ORDER — AZITHROMYCIN 500 MG/1
500 TABLET, FILM COATED ORAL DAILY
Qty: 1 PACKET | Refills: 0 | Status: SHIPPED | OUTPATIENT
Start: 2020-01-01 | End: 2020-01-01

## 2020-01-01 RX ORDER — LISINOPRIL 20 MG/1
20 TABLET ORAL DAILY
Qty: 30 TABLET | Refills: 0 | Status: SHIPPED | OUTPATIENT
Start: 2020-01-01

## 2020-01-01 RX ORDER — LORAZEPAM 1 MG/1
4 TABLET ORAL
Status: DISCONTINUED | OUTPATIENT
Start: 2020-01-01 | End: 2020-01-01 | Stop reason: HOSPADM

## 2020-01-01 RX ORDER — LORAZEPAM 1 MG/1
2 TABLET ORAL
Status: DISCONTINUED | OUTPATIENT
Start: 2020-01-01 | End: 2020-01-01

## 2020-01-01 RX ORDER — TOLVAPTAN 15 MG/1
15 TABLET ORAL ONCE
Status: COMPLETED | OUTPATIENT
Start: 2020-01-01 | End: 2020-01-01

## 2020-01-01 RX ORDER — MORPHINE SULFATE 4 MG/ML
4 INJECTION, SOLUTION INTRAMUSCULAR; INTRAVENOUS ONCE
Status: COMPLETED | OUTPATIENT
Start: 2020-01-01 | End: 2020-01-01

## 2020-01-01 RX ORDER — TIOTROPIUM BROMIDE 18 UG/1
18 CAPSULE ORAL; RESPIRATORY (INHALATION) DAILY
Qty: 30 CAPSULE | Refills: 5 | Status: SHIPPED | OUTPATIENT
Start: 2020-01-01

## 2020-01-01 RX ORDER — NICOTINE 21 MG/24HR
1 PATCH, TRANSDERMAL 24 HOURS TRANSDERMAL DAILY
Status: CANCELLED | OUTPATIENT
Start: 2020-01-01

## 2020-01-01 RX ORDER — PANTOPRAZOLE SODIUM 40 MG/1
40 TABLET, DELAYED RELEASE ORAL
Status: DISCONTINUED | OUTPATIENT
Start: 2020-01-01 | End: 2020-01-01 | Stop reason: HOSPADM

## 2020-01-01 RX ORDER — LISINOPRIL 10 MG/1
10 TABLET ORAL DAILY
Qty: 30 TABLET | Refills: 0 | Status: SHIPPED | OUTPATIENT
Start: 2020-01-01 | End: 2020-01-01 | Stop reason: SDUPTHER

## 2020-01-01 RX ORDER — DEXTROSE MONOHYDRATE 50 MG/ML
100 INJECTION, SOLUTION INTRAVENOUS PRN
Status: DISCONTINUED | OUTPATIENT
Start: 2020-01-01 | End: 2020-01-01 | Stop reason: HOSPADM

## 2020-01-01 RX ORDER — PANTOPRAZOLE SODIUM 40 MG/10ML
INJECTION, POWDER, LYOPHILIZED, FOR SOLUTION INTRAVENOUS
Status: COMPLETED
Start: 2020-01-01 | End: 2020-01-01

## 2020-01-01 RX ORDER — PAROXETINE HYDROCHLORIDE 20 MG/1
20 TABLET, FILM COATED ORAL DAILY
Qty: 30 TABLET | Refills: 2 | Status: SHIPPED | OUTPATIENT
Start: 2020-01-01 | End: 2020-01-01 | Stop reason: SDUPTHER

## 2020-01-01 RX ORDER — FERROUS SULFATE 325(65) MG
325 TABLET ORAL 2 TIMES DAILY WITH MEALS
Status: DISCONTINUED | OUTPATIENT
Start: 2020-01-01 | End: 2020-01-01 | Stop reason: HOSPADM

## 2020-01-01 RX ORDER — MORPHINE SULFATE 2 MG/ML
2 INJECTION, SOLUTION INTRAMUSCULAR; INTRAVENOUS EVERY 4 HOURS PRN
Status: DISCONTINUED | OUTPATIENT
Start: 2020-01-01 | End: 2020-01-01

## 2020-01-01 RX ORDER — OXYCODONE HYDROCHLORIDE 5 MG/1
5 TABLET ORAL EVERY 6 HOURS PRN
Status: DISCONTINUED | OUTPATIENT
Start: 2020-01-01 | End: 2020-01-01

## 2020-01-01 RX ORDER — ONDANSETRON 4 MG/1
4 TABLET, ORALLY DISINTEGRATING ORAL EVERY 8 HOURS PRN
Status: DISCONTINUED | OUTPATIENT
Start: 2020-01-01 | End: 2020-01-01 | Stop reason: HOSPADM

## 2020-01-01 RX ORDER — LISINOPRIL 10 MG/1
10 TABLET ORAL DAILY
Status: DISCONTINUED | OUTPATIENT
Start: 2020-01-01 | End: 2020-01-01

## 2020-01-01 RX ORDER — ALBUTEROL SULFATE 2.5 MG/3ML
SOLUTION RESPIRATORY (INHALATION)
Status: COMPLETED
Start: 2020-01-01 | End: 2020-01-01

## 2020-01-01 RX ORDER — TIOTROPIUM BROMIDE 18 UG/1
18 CAPSULE ORAL; RESPIRATORY (INHALATION) DAILY
Qty: 30 CAPSULE | Refills: 2 | Status: SHIPPED | OUTPATIENT
Start: 2020-01-01 | End: 2020-01-01 | Stop reason: SDUPTHER

## 2020-01-01 RX ORDER — SODIUM CHLORIDE, SODIUM LACTATE, POTASSIUM CHLORIDE, CALCIUM CHLORIDE 600; 310; 30; 20 MG/100ML; MG/100ML; MG/100ML; MG/100ML
INJECTION, SOLUTION INTRAVENOUS CONTINUOUS
Status: DISCONTINUED | OUTPATIENT
Start: 2020-01-01 | End: 2020-01-01

## 2020-01-01 RX ORDER — PANTOPRAZOLE SODIUM 40 MG/1
40 TABLET, DELAYED RELEASE ORAL DAILY PRN
Qty: 30 TABLET | Refills: 2 | Status: SHIPPED | OUTPATIENT
Start: 2020-01-01

## 2020-01-01 RX ORDER — VANCOMYCIN HYDROCHLORIDE 1 G/200ML
1000 INJECTION, SOLUTION INTRAVENOUS ONCE
Status: COMPLETED | OUTPATIENT
Start: 2020-01-01 | End: 2020-01-01

## 2020-01-01 RX ORDER — ACETAMINOPHEN 325 MG/1
650 TABLET ORAL EVERY 6 HOURS PRN
Status: CANCELLED | OUTPATIENT
Start: 2020-01-01

## 2020-01-01 RX ORDER — AMLODIPINE BESYLATE 10 MG/1
10 TABLET ORAL DAILY
Qty: 30 TABLET | Refills: 0 | Status: SHIPPED | OUTPATIENT
Start: 2020-01-01

## 2020-01-01 RX ORDER — POTASSIUM CHLORIDE 20 MEQ/1
20 TABLET, EXTENDED RELEASE ORAL 2 TIMES DAILY WITH MEALS
Status: DISCONTINUED | OUTPATIENT
Start: 2020-01-01 | End: 2020-01-01

## 2020-01-01 RX ORDER — DEXTROSE MONOHYDRATE 50 MG/ML
100 INJECTION, SOLUTION INTRAVENOUS PRN
Status: DISCONTINUED | OUTPATIENT
Start: 2020-01-01 | End: 2020-01-01 | Stop reason: SDUPTHER

## 2020-01-01 RX ORDER — PROMETHAZINE HYDROCHLORIDE 12.5 MG/1
12.5 TABLET ORAL EVERY 6 HOURS PRN
Status: DISCONTINUED | OUTPATIENT
Start: 2020-01-01 | End: 2020-01-01 | Stop reason: HOSPADM

## 2020-01-01 RX ORDER — ALBUTEROL SULFATE 90 UG/1
2 AEROSOL, METERED RESPIRATORY (INHALATION) EVERY 6 HOURS PRN
Qty: 1 INHALER | Refills: 1 | Status: SHIPPED | OUTPATIENT
Start: 2020-01-01

## 2020-01-01 RX ORDER — LORAZEPAM 2 MG/ML
4 INJECTION INTRAMUSCULAR
Status: DISCONTINUED | OUTPATIENT
Start: 2020-01-01 | End: 2020-01-01 | Stop reason: HOSPADM

## 2020-01-01 RX ORDER — LIDOCAINE 4 G/G
1 PATCH TOPICAL DAILY
Qty: 15 PATCH | Refills: 0 | Status: SHIPPED | OUTPATIENT
Start: 2020-01-01

## 2020-01-01 RX ORDER — POTASSIUM CHLORIDE 20 MEQ/1
40 TABLET, EXTENDED RELEASE ORAL PRN
Status: DISCONTINUED | OUTPATIENT
Start: 2020-01-01 | End: 2020-01-01 | Stop reason: HOSPADM

## 2020-01-01 RX ORDER — HYDROXYZINE HYDROCHLORIDE 25 MG/1
25 TABLET, FILM COATED ORAL 2 TIMES DAILY PRN
Qty: 60 TABLET | Refills: 5 | Status: SHIPPED | OUTPATIENT
Start: 2020-01-01

## 2020-01-01 RX ORDER — TRAMADOL HYDROCHLORIDE 50 MG/1
50 TABLET ORAL EVERY 6 HOURS PRN
Status: DISCONTINUED | OUTPATIENT
Start: 2020-01-01 | End: 2020-01-01 | Stop reason: HOSPADM

## 2020-01-01 RX ORDER — GABAPENTIN 300 MG/1
300 CAPSULE ORAL 2 TIMES DAILY
Status: DISCONTINUED | OUTPATIENT
Start: 2020-01-01 | End: 2020-01-01

## 2020-01-01 RX ORDER — LORAZEPAM 1 MG/1
3 TABLET ORAL
Status: DISCONTINUED | OUTPATIENT
Start: 2020-01-01 | End: 2020-01-01 | Stop reason: HOSPADM

## 2020-01-01 RX ORDER — NICOTINE POLACRILEX 4 MG
15 LOZENGE BUCCAL PRN
Status: DISCONTINUED | OUTPATIENT
Start: 2020-01-01 | End: 2020-01-01 | Stop reason: HOSPADM

## 2020-01-01 RX ORDER — NICOTINE 21 MG/24HR
1 PATCH, TRANSDERMAL 24 HOURS TRANSDERMAL DAILY
Status: DISCONTINUED | OUTPATIENT
Start: 2020-01-01 | End: 2020-01-01 | Stop reason: HOSPADM

## 2020-01-01 RX ORDER — GABAPENTIN 600 MG/1
600 TABLET ORAL 3 TIMES DAILY
Qty: 90 TABLET | Refills: 2 | Status: SHIPPED | OUTPATIENT
Start: 2020-01-01 | End: 2020-01-01 | Stop reason: SDUPTHER

## 2020-01-01 RX ORDER — PANTOPRAZOLE SODIUM 40 MG/1
40 TABLET, DELAYED RELEASE ORAL DAILY PRN
Qty: 30 TABLET | Refills: 1 | Status: SHIPPED | OUTPATIENT
Start: 2020-01-01 | End: 2020-01-01 | Stop reason: SDUPTHER

## 2020-01-01 RX ORDER — DOXYCYCLINE HYCLATE 100 MG
100 TABLET ORAL 2 TIMES DAILY
Qty: 20 TABLET | Refills: 0 | Status: SHIPPED | OUTPATIENT
Start: 2020-01-01 | End: 2020-01-01

## 2020-01-01 RX ORDER — LEVOFLOXACIN 500 MG/1
500 TABLET, FILM COATED ORAL DAILY
Status: DISCONTINUED | OUTPATIENT
Start: 2020-01-01 | End: 2020-01-01

## 2020-01-01 RX ORDER — LORAZEPAM 2 MG/ML
3 INJECTION INTRAMUSCULAR
Status: DISCONTINUED | OUTPATIENT
Start: 2020-01-01 | End: 2020-01-01 | Stop reason: HOSPADM

## 2020-01-01 RX ORDER — NICOTINE 21 MG/24HR
1 PATCH, TRANSDERMAL 24 HOURS TRANSDERMAL DAILY
Qty: 30 PATCH | Refills: 3 | Status: SHIPPED | OUTPATIENT
Start: 2020-01-01

## 2020-01-01 RX ORDER — LORAZEPAM 2 MG/ML
INJECTION INTRAMUSCULAR
Status: DISCONTINUED
Start: 2020-01-01 | End: 2020-01-01 | Stop reason: WASHOUT

## 2020-01-01 RX ORDER — LIDOCAINE HYDROCHLORIDE 10 MG/ML
5 INJECTION, SOLUTION EPIDURAL; INFILTRATION; INTRACAUDAL; PERINEURAL ONCE
Status: COMPLETED | OUTPATIENT
Start: 2020-01-01 | End: 2020-01-01

## 2020-01-01 RX ORDER — M-VIT,TX,IRON,MINS/CALC/FOLIC 27MG-0.4MG
1 TABLET ORAL DAILY
Qty: 30 TABLET | Refills: 0 | Status: SHIPPED | OUTPATIENT
Start: 2020-01-01

## 2020-01-01 RX ORDER — PANTOPRAZOLE SODIUM 40 MG/1
40 TABLET, DELAYED RELEASE ORAL DAILY
Qty: 60 TABLET | Refills: 0 | Status: SHIPPED | OUTPATIENT
Start: 2020-01-01 | End: 2020-01-01 | Stop reason: SDUPTHER

## 2020-01-01 RX ORDER — LIDOCAINE 4 G/G
1 PATCH TOPICAL DAILY
Status: DISCONTINUED | OUTPATIENT
Start: 2020-01-01 | End: 2020-01-01 | Stop reason: HOSPADM

## 2020-01-01 RX ORDER — FOLIC ACID 1 MG/1
1 TABLET ORAL DAILY
Qty: 30 TABLET | Refills: 3 | Status: SHIPPED | OUTPATIENT
Start: 2020-01-01

## 2020-01-01 RX ORDER — NICOTINE 21 MG/24HR
1 PATCH, TRANSDERMAL 24 HOURS TRANSDERMAL ONCE
Status: COMPLETED | OUTPATIENT
Start: 2020-01-01 | End: 2020-01-01

## 2020-01-01 RX ORDER — LORAZEPAM 1 MG/1
2 TABLET ORAL
Status: DISCONTINUED | OUTPATIENT
Start: 2020-01-01 | End: 2020-01-01 | Stop reason: HOSPADM

## 2020-01-01 RX ORDER — LORAZEPAM 2 MG/ML
2 INJECTION INTRAMUSCULAR
Status: DISCONTINUED | OUTPATIENT
Start: 2020-01-01 | End: 2020-01-01 | Stop reason: HOSPADM

## 2020-01-01 RX ORDER — OXYMETAZOLINE HYDROCHLORIDE 0.05 G/100ML
2 SPRAY NASAL ONCE
Status: COMPLETED | OUTPATIENT
Start: 2020-01-01 | End: 2020-01-01

## 2020-01-01 RX ORDER — POTASSIUM CHLORIDE 20 MEQ/1
40 TABLET, EXTENDED RELEASE ORAL PRN
Status: DISCONTINUED | OUTPATIENT
Start: 2020-01-01 | End: 2020-01-01

## 2020-01-01 RX ORDER — KETOROLAC TROMETHAMINE 30 MG/ML
15 INJECTION, SOLUTION INTRAMUSCULAR; INTRAVENOUS ONCE
Status: COMPLETED | OUTPATIENT
Start: 2020-01-01 | End: 2020-01-01

## 2020-01-01 RX ORDER — LORAZEPAM 2 MG/ML
1 INJECTION INTRAMUSCULAR
Status: DISCONTINUED | OUTPATIENT
Start: 2020-01-01 | End: 2020-01-01 | Stop reason: HOSPADM

## 2020-01-01 RX ORDER — HEPARIN SODIUM 5000 [USP'U]/ML
5000 INJECTION, SOLUTION INTRAVENOUS; SUBCUTANEOUS EVERY 8 HOURS SCHEDULED
Status: DISCONTINUED | OUTPATIENT
Start: 2020-01-01 | End: 2020-01-01

## 2020-01-01 RX ORDER — POTASSIUM CHLORIDE 7.45 MG/ML
10 INJECTION INTRAVENOUS PRN
Status: DISCONTINUED | OUTPATIENT
Start: 2020-01-01 | End: 2020-01-01

## 2020-01-01 RX ORDER — GABAPENTIN 300 MG/1
300 CAPSULE ORAL 2 TIMES DAILY
COMMUNITY
End: 2020-01-01 | Stop reason: SDUPTHER

## 2020-01-01 RX ORDER — OMEPRAZOLE 20 MG/1
20 CAPSULE, DELAYED RELEASE ORAL DAILY
Status: ON HOLD | COMMUNITY
End: 2020-01-01 | Stop reason: HOSPADM

## 2020-01-01 RX ORDER — ONDANSETRON 2 MG/ML
4 INJECTION INTRAMUSCULAR; INTRAVENOUS EVERY 6 HOURS PRN
Status: DISCONTINUED | OUTPATIENT
Start: 2020-01-01 | End: 2020-01-01 | Stop reason: HOSPADM

## 2020-01-01 RX ORDER — PHENOBARBITAL SODIUM 65 MG/ML
65 INJECTION INTRAMUSCULAR EVERY 8 HOURS SCHEDULED
Status: DISCONTINUED | OUTPATIENT
Start: 2020-01-01 | End: 2020-01-01

## 2020-01-01 RX ORDER — LIDOCAINE HYDROCHLORIDE 20 MG/ML
JELLY TOPICAL ONCE
Status: COMPLETED | OUTPATIENT
Start: 2020-01-01 | End: 2020-01-01

## 2020-01-01 RX ORDER — TIOTROPIUM BROMIDE 18 UG/1
18 CAPSULE ORAL; RESPIRATORY (INHALATION) DAILY
Qty: 30 CAPSULE | Refills: 0 | Status: SHIPPED | OUTPATIENT
Start: 2020-01-01 | End: 2020-01-01 | Stop reason: SDUPTHER

## 2020-01-01 RX ADMIN — GABAPENTIN 600 MG: 300 CAPSULE ORAL at 09:32

## 2020-01-01 RX ADMIN — TRAMADOL HYDROCHLORIDE 50 MG: 50 TABLET, FILM COATED ORAL at 23:02

## 2020-01-01 RX ADMIN — TRAMADOL HYDROCHLORIDE 50 MG: 50 TABLET, FILM COATED ORAL at 19:51

## 2020-01-01 RX ADMIN — METOPROLOL TARTRATE 100 MG: 50 TABLET, FILM COATED ORAL at 10:04

## 2020-01-01 RX ADMIN — HYDRALAZINE HYDROCHLORIDE 50 MG: 50 TABLET, FILM COATED ORAL at 21:16

## 2020-01-01 RX ADMIN — HYDRALAZINE HYDROCHLORIDE 50 MG: 50 TABLET, FILM COATED ORAL at 06:14

## 2020-01-01 RX ADMIN — SODIUM BICARBONATE 650 MG TABLET 1300 MG: at 08:12

## 2020-01-01 RX ADMIN — CEFEPIME HYDROCHLORIDE 1 G: 2 INJECTION, POWDER, FOR SOLUTION INTRAVENOUS at 23:46

## 2020-01-01 RX ADMIN — TIOTROPIUM BROMIDE INHALATION SPRAY 2 PUFF: 3.12 SPRAY, METERED RESPIRATORY (INHALATION) at 08:26

## 2020-01-01 RX ADMIN — SODIUM CHLORIDE: 9 INJECTION, SOLUTION INTRAVENOUS at 22:42

## 2020-01-01 RX ADMIN — TRAMADOL HYDROCHLORIDE 50 MG: 50 TABLET, FILM COATED ORAL at 22:02

## 2020-01-01 RX ADMIN — POTASSIUM CHLORIDE AND SODIUM CHLORIDE: 900; 300 INJECTION, SOLUTION INTRAVENOUS at 12:52

## 2020-01-01 RX ADMIN — AMLODIPINE BESYLATE 10 MG: 10 TABLET ORAL at 08:47

## 2020-01-01 RX ADMIN — FOLIC ACID 1 MG: 1 TABLET ORAL at 10:14

## 2020-01-01 RX ADMIN — PHENOBARBITAL SODIUM 65 MG: 65 INJECTION INTRAMUSCULAR at 13:49

## 2020-01-01 RX ADMIN — ACETAMINOPHEN 650 MG: 325 TABLET ORAL at 18:22

## 2020-01-01 RX ADMIN — LORAZEPAM 1 MG: 1 TABLET ORAL at 09:46

## 2020-01-01 RX ADMIN — CARVEDILOL 25 MG: 25 TABLET, FILM COATED ORAL at 08:47

## 2020-01-01 RX ADMIN — AZITHROMYCIN MONOHYDRATE 500 MG: 500 INJECTION, POWDER, LYOPHILIZED, FOR SOLUTION INTRAVENOUS at 14:53

## 2020-01-01 RX ADMIN — INSULIN LISPRO 2 UNITS: 100 INJECTION, SOLUTION INTRAVENOUS; SUBCUTANEOUS at 00:43

## 2020-01-01 RX ADMIN — GABAPENTIN 300 MG: 300 CAPSULE ORAL at 08:50

## 2020-01-01 RX ADMIN — Medication 100 MG: at 09:11

## 2020-01-01 RX ADMIN — Medication 100 MG: at 08:37

## 2020-01-01 RX ADMIN — I.V. FAT EMULSION 250 ML: 20 EMULSION INTRAVENOUS at 18:56

## 2020-01-01 RX ADMIN — DEXTROSE MONOHYDRATE: 50 INJECTION, SOLUTION INTRAVENOUS at 03:24

## 2020-01-01 RX ADMIN — PAROXETINE HYDROCHLORIDE 20 MG: 20 TABLET, FILM COATED ORAL at 08:38

## 2020-01-01 RX ADMIN — PANTOPRAZOLE SODIUM 40 MG: 40 INJECTION, POWDER, FOR SOLUTION INTRAVENOUS at 08:10

## 2020-01-01 RX ADMIN — POTASSIUM CHLORIDE 10 MEQ: 7.46 INJECTION, SOLUTION INTRAVENOUS at 22:26

## 2020-01-01 RX ADMIN — GABAPENTIN 300 MG: 300 CAPSULE ORAL at 09:44

## 2020-01-01 RX ADMIN — HYDRALAZINE HYDROCHLORIDE 50 MG: 50 TABLET, FILM COATED ORAL at 23:03

## 2020-01-01 RX ADMIN — SODIUM CITRATE AND CITRIC ACID MONOHYDRATE 30 ML: 500; 334 SOLUTION ORAL at 21:19

## 2020-01-01 RX ADMIN — SODIUM CHLORIDE: 9 INJECTION, SOLUTION INTRAVENOUS at 00:09

## 2020-01-01 RX ADMIN — MORPHINE SULFATE 2 MG: 2 INJECTION, SOLUTION INTRAMUSCULAR; INTRAVENOUS at 22:42

## 2020-01-01 RX ADMIN — GABAPENTIN 300 MG: 300 CAPSULE ORAL at 08:53

## 2020-01-01 RX ADMIN — POTASSIUM BICARBONATE 40 MEQ: 782 TABLET, EFFERVESCENT ORAL at 19:30

## 2020-01-01 RX ADMIN — LORAZEPAM 2 MG: 1 TABLET ORAL at 08:46

## 2020-01-01 RX ADMIN — PAROXETINE HYDROCHLORIDE 20 MG: 20 TABLET, FILM COATED ORAL at 08:48

## 2020-01-01 RX ADMIN — TRAMADOL HYDROCHLORIDE 50 MG: 50 TABLET, FILM COATED ORAL at 09:34

## 2020-01-01 RX ADMIN — SODIUM CHLORIDE, PRESERVATIVE FREE 10 ML: 5 INJECTION INTRAVENOUS at 08:29

## 2020-01-01 RX ADMIN — HYDRALAZINE HYDROCHLORIDE 50 MG: 50 TABLET ORAL at 21:17

## 2020-01-01 RX ADMIN — CEFEPIME 2 G: 20 INJECTION, POWDER, FOR SOLUTION INTRAVENOUS at 09:03

## 2020-01-01 RX ADMIN — HYDRALAZINE HYDROCHLORIDE 50 MG: 50 TABLET, FILM COATED ORAL at 14:47

## 2020-01-01 RX ADMIN — POTASSIUM CHLORIDE 10 MEQ: 7.46 INJECTION, SOLUTION INTRAVENOUS at 13:40

## 2020-01-01 RX ADMIN — LORAZEPAM 1 MG: 1 TABLET ORAL at 09:34

## 2020-01-01 RX ADMIN — SODIUM CHLORIDE, PRESERVATIVE FREE 10 ML: 5 INJECTION INTRAVENOUS at 11:32

## 2020-01-01 RX ADMIN — SODIUM CHLORIDE, PRESERVATIVE FREE 10 ML: 5 INJECTION INTRAVENOUS at 21:15

## 2020-01-01 RX ADMIN — HEPARIN SODIUM 5000 UNITS: 5000 INJECTION, SOLUTION INTRAVENOUS; SUBCUTANEOUS at 06:12

## 2020-01-01 RX ADMIN — AZITHROMYCIN MONOHYDRATE 500 MG: 500 INJECTION, POWDER, LYOPHILIZED, FOR SOLUTION INTRAVENOUS at 15:44

## 2020-01-01 RX ADMIN — HEPARIN SODIUM 5000 UNITS: 5000 INJECTION, SOLUTION INTRAVENOUS; SUBCUTANEOUS at 23:37

## 2020-01-01 RX ADMIN — FOLIC ACID 1 MG: 1 TABLET ORAL at 08:38

## 2020-01-01 RX ADMIN — OXYMETAZOLINE HYDROCHLORIDE 2 SPRAY: 5 SPRAY NASAL at 20:19

## 2020-01-01 RX ADMIN — HYDRALAZINE HYDROCHLORIDE 50 MG: 50 TABLET ORAL at 22:17

## 2020-01-01 RX ADMIN — SODIUM BICARBONATE 650 MG TABLET 1300 MG: at 21:04

## 2020-01-01 RX ADMIN — TRAMADOL HYDROCHLORIDE 50 MG: 50 TABLET, FILM COATED ORAL at 08:46

## 2020-01-01 RX ADMIN — HYDRALAZINE HYDROCHLORIDE 50 MG: 50 TABLET, FILM COATED ORAL at 21:14

## 2020-01-01 RX ADMIN — LINEZOLID 600 MG: 600 INJECTION, SOLUTION INTRAVENOUS at 01:05

## 2020-01-01 RX ADMIN — LORAZEPAM 1 MG: 1 TABLET ORAL at 11:15

## 2020-01-01 RX ADMIN — HYDRALAZINE HYDROCHLORIDE 50 MG: 50 TABLET, FILM COATED ORAL at 05:37

## 2020-01-01 RX ADMIN — ACETAMINOPHEN 650 MG: 325 TABLET ORAL at 11:02

## 2020-01-01 RX ADMIN — HEPARIN SODIUM 5000 UNITS: 5000 INJECTION, SOLUTION INTRAVENOUS; SUBCUTANEOUS at 22:42

## 2020-01-01 RX ADMIN — SODIUM BICARBONATE 650 MG TABLET 650 MG: at 13:50

## 2020-01-01 RX ADMIN — TIOTROPIUM BROMIDE INHALATION SPRAY 2 PUFF: 3.12 SPRAY, METERED RESPIRATORY (INHALATION) at 10:09

## 2020-01-01 RX ADMIN — Medication 100 MG: at 08:52

## 2020-01-01 RX ADMIN — KETOROLAC TROMETHAMINE 15 MG: 30 INJECTION, SOLUTION INTRAMUSCULAR; INTRAVENOUS at 22:26

## 2020-01-01 RX ADMIN — SODIUM CHLORIDE, PRESERVATIVE FREE 10 ML: 5 INJECTION INTRAVENOUS at 08:15

## 2020-01-01 RX ADMIN — CEFEPIME 2 G: 20 INJECTION, POWDER, FOR SOLUTION INTRAVENOUS at 21:19

## 2020-01-01 RX ADMIN — THIAMINE HYDROCHLORIDE 100 MG: 100 INJECTION, SOLUTION INTRAMUSCULAR; INTRAVENOUS at 09:59

## 2020-01-01 RX ADMIN — GABAPENTIN 300 MG: 300 CAPSULE ORAL at 08:14

## 2020-01-01 RX ADMIN — TRAMADOL HYDROCHLORIDE 50 MG: 50 TABLET, FILM COATED ORAL at 23:55

## 2020-01-01 RX ADMIN — METOPROLOL TARTRATE 50 MG: 50 TABLET, FILM COATED ORAL at 20:49

## 2020-01-01 RX ADMIN — AMLODIPINE BESYLATE 5 MG: 5 TABLET ORAL at 07:58

## 2020-01-01 RX ADMIN — VANCOMYCIN HYDROCHLORIDE 1250 MG: 5 INJECTION, POWDER, LYOPHILIZED, FOR SOLUTION INTRAVENOUS at 03:13

## 2020-01-01 RX ADMIN — HEPARIN SODIUM 5000 UNITS: 5000 INJECTION, SOLUTION INTRAVENOUS; SUBCUTANEOUS at 20:49

## 2020-01-01 RX ADMIN — CEFEPIME 2 G: 20 INJECTION, POWDER, FOR SOLUTION INTRAVENOUS at 20:48

## 2020-01-01 RX ADMIN — HEPARIN SODIUM 5000 UNITS: 5000 INJECTION, SOLUTION INTRAVENOUS; SUBCUTANEOUS at 14:20

## 2020-01-01 RX ADMIN — PANTOPRAZOLE SODIUM 40 MG: 40 INJECTION, POWDER, FOR SOLUTION INTRAVENOUS at 08:37

## 2020-01-01 RX ADMIN — TRAMADOL HYDROCHLORIDE 50 MG: 50 TABLET, FILM COATED ORAL at 10:36

## 2020-01-01 RX ADMIN — IRON SUCROSE 300 MG: 20 INJECTION, SOLUTION INTRAVENOUS at 11:57

## 2020-01-01 RX ADMIN — FOLIC ACID: 5 INJECTION, SOLUTION INTRAMUSCULAR; INTRAVENOUS; SUBCUTANEOUS at 21:36

## 2020-01-01 RX ADMIN — FERROUS SULFATE TAB 325 MG (65 MG ELEMENTAL FE) 325 MG: 325 (65 FE) TAB at 17:28

## 2020-01-01 RX ADMIN — HYDRALAZINE HYDROCHLORIDE 50 MG: 50 TABLET, FILM COATED ORAL at 13:50

## 2020-01-01 RX ADMIN — PANTOPRAZOLE SODIUM 40 MG: 40 INJECTION, POWDER, FOR SOLUTION INTRAVENOUS at 08:52

## 2020-01-01 RX ADMIN — CARVEDILOL 25 MG: 25 TABLET, FILM COATED ORAL at 17:38

## 2020-01-01 RX ADMIN — CEFDINIR 300 MG: 300 CAPSULE ORAL at 08:51

## 2020-01-01 RX ADMIN — METOPROLOL TARTRATE 100 MG: 50 TABLET, FILM COATED ORAL at 08:48

## 2020-01-01 RX ADMIN — MAGNESIUM SULFATE HEPTAHYDRATE 2 G: 40 INJECTION, SOLUTION INTRAVENOUS at 11:39

## 2020-01-01 RX ADMIN — HYDRALAZINE HYDROCHLORIDE 50 MG: 50 TABLET, FILM COATED ORAL at 05:27

## 2020-01-01 RX ADMIN — FIDAXOMICIN 200 MG: 200 TABLET, FILM COATED ORAL at 21:47

## 2020-01-01 RX ADMIN — HYDRALAZINE HYDROCHLORIDE 50 MG: 50 TABLET, FILM COATED ORAL at 06:44

## 2020-01-01 RX ADMIN — HYDRALAZINE HYDROCHLORIDE 50 MG: 50 TABLET, FILM COATED ORAL at 13:19

## 2020-01-01 RX ADMIN — SODIUM CHLORIDE 20 ML: 9 INJECTION, SOLUTION INTRAVENOUS at 16:54

## 2020-01-01 RX ADMIN — SODIUM CHLORIDE, POTASSIUM CHLORIDE, SODIUM LACTATE AND CALCIUM CHLORIDE: 600; 310; 30; 20 INJECTION, SOLUTION INTRAVENOUS at 09:39

## 2020-01-01 RX ADMIN — HEPARIN SODIUM 5000 UNITS: 5000 INJECTION, SOLUTION INTRAVENOUS; SUBCUTANEOUS at 05:46

## 2020-01-01 RX ADMIN — POTASSIUM CHLORIDE 20 MEQ: 1500 TABLET, EXTENDED RELEASE ORAL at 18:07

## 2020-01-01 RX ADMIN — Medication 100 MG: at 10:04

## 2020-01-01 RX ADMIN — LORAZEPAM 1 MG: 1 TABLET ORAL at 21:14

## 2020-01-01 RX ADMIN — SODIUM CHLORIDE, PRESERVATIVE FREE 10 ML: 5 INJECTION INTRAVENOUS at 21:57

## 2020-01-01 RX ADMIN — HYDRALAZINE HYDROCHLORIDE 50 MG: 50 TABLET, FILM COATED ORAL at 06:53

## 2020-01-01 RX ADMIN — LORAZEPAM 1 MG: 1 TABLET ORAL at 05:00

## 2020-01-01 RX ADMIN — SODIUM BICARBONATE 650 MG TABLET 1300 MG: at 08:28

## 2020-01-01 RX ADMIN — METOPROLOL TARTRATE 100 MG: 50 TABLET, FILM COATED ORAL at 21:07

## 2020-01-01 RX ADMIN — HYDRALAZINE HYDROCHLORIDE 50 MG: 50 TABLET, FILM COATED ORAL at 21:47

## 2020-01-01 RX ADMIN — LORAZEPAM 4 MG: 2 INJECTION INTRAMUSCULAR; INTRAVENOUS at 00:21

## 2020-01-01 RX ADMIN — FERROUS SULFATE TAB 325 MG (65 MG ELEMENTAL FE) 325 MG: 325 (65 FE) TAB at 09:33

## 2020-01-01 RX ADMIN — SODIUM CHLORIDE: 9 INJECTION, SOLUTION INTRAVENOUS at 23:37

## 2020-01-01 RX ADMIN — LORAZEPAM 1 MG: 1 TABLET ORAL at 22:02

## 2020-01-01 RX ADMIN — METOPROLOL TARTRATE 100 MG: 50 TABLET, FILM COATED ORAL at 20:53

## 2020-01-01 RX ADMIN — SODIUM CHLORIDE, PRESERVATIVE FREE 10 ML: 5 INJECTION INTRAVENOUS at 08:52

## 2020-01-01 RX ADMIN — AZITHROMYCIN MONOHYDRATE 500 MG: 500 INJECTION, POWDER, LYOPHILIZED, FOR SOLUTION INTRAVENOUS at 14:12

## 2020-01-01 RX ADMIN — TRAMADOL HYDROCHLORIDE 50 MG: 50 TABLET, FILM COATED ORAL at 02:33

## 2020-01-01 RX ADMIN — MAGNESIUM SULFATE IN WATER 2 G: 40 INJECTION, SOLUTION INTRAVENOUS at 15:51

## 2020-01-01 RX ADMIN — ALBUTEROL SULFATE 2.5 MG: 2.5 SOLUTION RESPIRATORY (INHALATION) at 03:44

## 2020-01-01 RX ADMIN — CEFEPIME HYDROCHLORIDE 2 G: 2 INJECTION, POWDER, FOR SOLUTION INTRAVENOUS at 20:21

## 2020-01-01 RX ADMIN — FERROUS SULFATE TAB 325 MG (65 MG ELEMENTAL FE) 325 MG: 325 (65 FE) TAB at 17:32

## 2020-01-01 RX ADMIN — POTASSIUM CHLORIDE 10 MEQ: 7.46 INJECTION, SOLUTION INTRAVENOUS at 02:22

## 2020-01-01 RX ADMIN — LIDOCAINE HYDROCHLORIDE ANHYDROUS 5 ML: 10 INJECTION, SOLUTION INFILTRATION at 18:08

## 2020-01-01 RX ADMIN — SODIUM BICARBONATE 650 MG TABLET 1300 MG: at 20:41

## 2020-01-01 RX ADMIN — TRAMADOL HYDROCHLORIDE 50 MG: 50 TABLET, FILM COATED ORAL at 00:16

## 2020-01-01 RX ADMIN — HYDRALAZINE HYDROCHLORIDE 50 MG: 50 TABLET ORAL at 05:18

## 2020-01-01 RX ADMIN — TRAMADOL HYDROCHLORIDE 50 MG: 50 TABLET, FILM COATED ORAL at 16:49

## 2020-01-01 RX ADMIN — LINEZOLID 600 MG: 600 INJECTION, SOLUTION INTRAVENOUS at 13:27

## 2020-01-01 RX ADMIN — HYDRALAZINE HYDROCHLORIDE 50 MG: 50 TABLET, FILM COATED ORAL at 21:42

## 2020-01-01 RX ADMIN — THERA TABS 1 TABLET: TAB at 07:59

## 2020-01-01 RX ADMIN — FOLIC ACID 1 MG: 1 TABLET ORAL at 09:28

## 2020-01-01 RX ADMIN — TIOTROPIUM BROMIDE INHALATION SPRAY 2 PUFF: 3.12 SPRAY, METERED RESPIRATORY (INHALATION) at 08:23

## 2020-01-01 RX ADMIN — PANTOPRAZOLE SODIUM 40 MG: 40 INJECTION, POWDER, FOR SOLUTION INTRAVENOUS at 10:05

## 2020-01-01 RX ADMIN — HEPARIN SODIUM 5000 UNITS: 5000 INJECTION, SOLUTION INTRAVENOUS; SUBCUTANEOUS at 13:40

## 2020-01-01 RX ADMIN — SODIUM CHLORIDE, PRESERVATIVE FREE 10 ML: 5 INJECTION INTRAVENOUS at 20:54

## 2020-01-01 RX ADMIN — CARVEDILOL 25 MG: 25 TABLET, FILM COATED ORAL at 10:54

## 2020-01-01 RX ADMIN — CEFDINIR 300 MG: 300 CAPSULE ORAL at 20:53

## 2020-01-01 RX ADMIN — SODIUM CHLORIDE, PRESERVATIVE FREE 10 ML: 5 INJECTION INTRAVENOUS at 08:18

## 2020-01-01 RX ADMIN — HEPARIN SODIUM 5000 UNITS: 5000 INJECTION, SOLUTION INTRAVENOUS; SUBCUTANEOUS at 22:53

## 2020-01-01 RX ADMIN — SODIUM CHLORIDE, PRESERVATIVE FREE 10 ML: 5 INJECTION INTRAVENOUS at 21:21

## 2020-01-01 RX ADMIN — PANTOPRAZOLE SODIUM 40 MG: 40 TABLET, DELAYED RELEASE ORAL at 07:08

## 2020-01-01 RX ADMIN — CEFEPIME HYDROCHLORIDE 1 G: 2 INJECTION, POWDER, FOR SOLUTION INTRAVENOUS at 12:04

## 2020-01-01 RX ADMIN — GABAPENTIN 600 MG: 300 CAPSULE ORAL at 09:57

## 2020-01-01 RX ADMIN — SODIUM CHLORIDE, PRESERVATIVE FREE 10 ML: 5 INJECTION INTRAVENOUS at 15:49

## 2020-01-01 RX ADMIN — SODIUM CHLORIDE: 9 INJECTION, SOLUTION INTRAVENOUS at 19:16

## 2020-01-01 RX ADMIN — ACETAMINOPHEN 650 MG: 325 TABLET ORAL at 09:04

## 2020-01-01 RX ADMIN — PANTOPRAZOLE SODIUM 40 MG: 40 INJECTION, POWDER, FOR SOLUTION INTRAVENOUS at 08:46

## 2020-01-01 RX ADMIN — CARVEDILOL 25 MG: 25 TABLET, FILM COATED ORAL at 18:38

## 2020-01-01 RX ADMIN — SODIUM CHLORIDE, PRESERVATIVE FREE 10 ML: 5 INJECTION INTRAVENOUS at 21:58

## 2020-01-01 RX ADMIN — FERROUS SULFATE TAB 325 MG (65 MG ELEMENTAL FE) 325 MG: 325 (65 FE) TAB at 08:46

## 2020-01-01 RX ADMIN — SODIUM BICARBONATE 650 MG TABLET 1300 MG: at 10:05

## 2020-01-01 RX ADMIN — TRAMADOL HYDROCHLORIDE 50 MG: 50 TABLET, FILM COATED ORAL at 21:42

## 2020-01-01 RX ADMIN — ENOXAPARIN SODIUM 40 MG: 40 INJECTION SUBCUTANEOUS at 09:58

## 2020-01-01 RX ADMIN — SODIUM CHLORIDE: 9 INJECTION, SOLUTION INTRAVENOUS at 20:32

## 2020-01-01 RX ADMIN — INSULIN LISPRO 2 UNITS: 100 INJECTION, SOLUTION INTRAVENOUS; SUBCUTANEOUS at 13:12

## 2020-01-01 RX ADMIN — PAROXETINE HYDROCHLORIDE 20 MG: 20 TABLET, FILM COATED ORAL at 08:47

## 2020-01-01 RX ADMIN — HYDRALAZINE HYDROCHLORIDE 50 MG: 50 TABLET, FILM COATED ORAL at 20:48

## 2020-01-01 RX ADMIN — METOPROLOL TARTRATE 100 MG: 50 TABLET, FILM COATED ORAL at 20:41

## 2020-01-01 RX ADMIN — LINEZOLID 600 MG: 600 INJECTION, SOLUTION INTRAVENOUS at 14:59

## 2020-01-01 RX ADMIN — SODIUM CHLORIDE, PRESERVATIVE FREE 10 ML: 5 INJECTION INTRAVENOUS at 21:53

## 2020-01-01 RX ADMIN — HEPARIN SODIUM 5000 UNITS: 5000 INJECTION, SOLUTION INTRAVENOUS; SUBCUTANEOUS at 04:52

## 2020-01-01 RX ADMIN — OXYCODONE HYDROCHLORIDE 5 MG: 5 TABLET ORAL at 15:59

## 2020-01-01 RX ADMIN — DEXTROSE 50 % IN WATER (D50W) INTRAVENOUS SYRINGE 6.25 G: at 07:05

## 2020-01-01 RX ADMIN — CEFEPIME HYDROCHLORIDE 1 G: 2 INJECTION, POWDER, FOR SOLUTION INTRAVENOUS at 00:50

## 2020-01-01 RX ADMIN — SODIUM CHLORIDE, PRESERVATIVE FREE 10 ML: 5 INJECTION INTRAVENOUS at 08:36

## 2020-01-01 RX ADMIN — SODIUM CHLORIDE, PRESERVATIVE FREE 10 ML: 5 INJECTION INTRAVENOUS at 08:51

## 2020-01-01 RX ADMIN — SODIUM CHLORIDE: 9 INJECTION, SOLUTION INTRAVENOUS at 10:50

## 2020-01-01 RX ADMIN — HEPARIN SODIUM 5000 UNITS: 5000 INJECTION, SOLUTION INTRAVENOUS; SUBCUTANEOUS at 21:24

## 2020-01-01 RX ADMIN — Medication 100 MG: at 08:14

## 2020-01-01 RX ADMIN — FOLIC ACID 1 MG: 1 TABLET ORAL at 08:45

## 2020-01-01 RX ADMIN — Medication 100 MG: at 11:30

## 2020-01-01 RX ADMIN — SODIUM BICARBONATE 650 MG TABLET 1300 MG: at 13:28

## 2020-01-01 RX ADMIN — CEFEPIME 2 G: 20 INJECTION, POWDER, FOR SOLUTION INTRAVENOUS at 10:03

## 2020-01-01 RX ADMIN — Medication 100 MG: at 08:46

## 2020-01-01 RX ADMIN — SODIUM CHLORIDE, PRESERVATIVE FREE 10 ML: 5 INJECTION INTRAVENOUS at 10:14

## 2020-01-01 RX ADMIN — LORAZEPAM 1 MG: 1 TABLET ORAL at 11:23

## 2020-01-01 RX ADMIN — PAROXETINE HYDROCHLORIDE 20 MG: 20 TABLET, FILM COATED ORAL at 07:58

## 2020-01-01 RX ADMIN — POTASSIUM CHLORIDE 20 MEQ: 1500 TABLET, EXTENDED RELEASE ORAL at 08:47

## 2020-01-01 RX ADMIN — PANTOPRAZOLE SODIUM 40 MG: 40 INJECTION, POWDER, FOR SOLUTION INTRAVENOUS at 08:50

## 2020-01-01 RX ADMIN — SODIUM CHLORIDE, PRESERVATIVE FREE 10 ML: 5 INJECTION INTRAVENOUS at 10:05

## 2020-01-01 RX ADMIN — CEFEPIME HYDROCHLORIDE 1 G: 2 INJECTION, POWDER, FOR SOLUTION INTRAVENOUS at 12:05

## 2020-01-01 RX ADMIN — SODIUM CHLORIDE, PRESERVATIVE FREE 10 ML: 5 INJECTION INTRAVENOUS at 08:11

## 2020-01-01 RX ADMIN — SODIUM CHLORIDE, PRESERVATIVE FREE 10 ML: 5 INJECTION INTRAVENOUS at 20:57

## 2020-01-01 RX ADMIN — HEPARIN SODIUM 5000 UNITS: 5000 INJECTION, SOLUTION INTRAVENOUS; SUBCUTANEOUS at 06:16

## 2020-01-01 RX ADMIN — HYDRALAZINE HYDROCHLORIDE 50 MG: 50 TABLET ORAL at 05:30

## 2020-01-01 RX ADMIN — TRAMADOL HYDROCHLORIDE 50 MG: 50 TABLET, FILM COATED ORAL at 15:48

## 2020-01-01 RX ADMIN — GABAPENTIN 300 MG: 300 CAPSULE ORAL at 09:11

## 2020-01-01 RX ADMIN — CARVEDILOL 25 MG: 25 TABLET, FILM COATED ORAL at 17:28

## 2020-01-01 RX ADMIN — CEFDINIR 300 MG: 300 CAPSULE ORAL at 21:39

## 2020-01-01 RX ADMIN — PAROXETINE HYDROCHLORIDE 20 MG: 20 TABLET, FILM COATED ORAL at 10:02

## 2020-01-01 RX ADMIN — SODIUM BICARBONATE 650 MG TABLET 1300 MG: at 13:40

## 2020-01-01 RX ADMIN — FIDAXOMICIN 200 MG: 200 TABLET, FILM COATED ORAL at 08:36

## 2020-01-01 RX ADMIN — TIOTROPIUM BROMIDE INHALATION SPRAY 2 PUFF: 3.12 SPRAY, METERED RESPIRATORY (INHALATION) at 08:16

## 2020-01-01 RX ADMIN — LORAZEPAM 2 MG: 2 INJECTION, SOLUTION INTRAMUSCULAR; INTRAVENOUS at 05:47

## 2020-01-01 RX ADMIN — POTASSIUM CHLORIDE AND SODIUM CHLORIDE: 900; 300 INJECTION, SOLUTION INTRAVENOUS at 10:04

## 2020-01-01 RX ADMIN — GABAPENTIN 600 MG: 300 CAPSULE ORAL at 08:47

## 2020-01-01 RX ADMIN — PAROXETINE HYDROCHLORIDE 20 MG: 20 TABLET, FILM COATED ORAL at 11:31

## 2020-01-01 RX ADMIN — LORAZEPAM 1 MG: 1 TABLET ORAL at 01:43

## 2020-01-01 RX ADMIN — SODIUM CHLORIDE, PRESERVATIVE FREE 10 ML: 5 INJECTION INTRAVENOUS at 20:58

## 2020-01-01 RX ADMIN — TRAMADOL HYDROCHLORIDE 50 MG: 50 TABLET, FILM COATED ORAL at 09:46

## 2020-01-01 RX ADMIN — Medication 100 MG: at 09:23

## 2020-01-01 RX ADMIN — HYDRALAZINE HYDROCHLORIDE 50 MG: 50 TABLET ORAL at 20:26

## 2020-01-01 RX ADMIN — SODIUM CHLORIDE, PRESERVATIVE FREE 10 ML: 5 INJECTION INTRAVENOUS at 20:42

## 2020-01-01 RX ADMIN — MORPHINE SULFATE 2 MG: 2 INJECTION, SOLUTION INTRAMUSCULAR; INTRAVENOUS at 17:56

## 2020-01-01 RX ADMIN — LORAZEPAM 1 MG: 1 TABLET ORAL at 11:51

## 2020-01-01 RX ADMIN — HYDRALAZINE HYDROCHLORIDE 50 MG: 50 TABLET, FILM COATED ORAL at 06:12

## 2020-01-01 RX ADMIN — TRAMADOL HYDROCHLORIDE 50 MG: 50 TABLET, FILM COATED ORAL at 05:14

## 2020-01-01 RX ADMIN — FOLIC ACID 1 MG: 1 TABLET ORAL at 08:48

## 2020-01-01 RX ADMIN — HYDRALAZINE HYDROCHLORIDE 50 MG: 50 TABLET, FILM COATED ORAL at 21:53

## 2020-01-01 RX ADMIN — HEPARIN SODIUM 5000 UNITS: 5000 INJECTION, SOLUTION INTRAVENOUS; SUBCUTANEOUS at 05:27

## 2020-01-01 RX ADMIN — TIOTROPIUM BROMIDE INHALATION SPRAY 2 PUFF: 3.12 SPRAY, METERED RESPIRATORY (INHALATION) at 07:44

## 2020-01-01 RX ADMIN — MORPHINE SULFATE 2 MG: 2 INJECTION, SOLUTION INTRAMUSCULAR; INTRAVENOUS at 04:36

## 2020-01-01 RX ADMIN — SODIUM CHLORIDE, PRESERVATIVE FREE 10 ML: 5 INJECTION INTRAVENOUS at 08:47

## 2020-01-01 RX ADMIN — SODIUM CHLORIDE, PRESERVATIVE FREE 10 ML: 5 INJECTION INTRAVENOUS at 09:29

## 2020-01-01 RX ADMIN — POTASSIUM CHLORIDE 10 MEQ: 7.46 INJECTION, SOLUTION INTRAVENOUS at 19:16

## 2020-01-01 RX ADMIN — FERROUS SULFATE TAB 325 MG (65 MG ELEMENTAL FE) 325 MG: 325 (65 FE) TAB at 16:36

## 2020-01-01 RX ADMIN — ASCORBIC ACID, VITAMIN A PALMITATE, CHOLECALCIFEROL, THIAMINE HYDROCHLORIDE, RIBOFLAVIN-5 PHOSPHATE SODIUM, PYRIDOXINE HYDROCHLORIDE, NIACINAMIDE, DEXPANTHENOL, ALPHA-TOCOPHEROL ACETATE, VITAMIN K1, FOLIC ACID, BIOTIN, CYANOCOBALAMIN: 200; 3300; 200; 6; 3.6; 6; 40; 15; 10; 150; 600; 60; 5 INJECTION, SOLUTION INTRAVENOUS at 18:14

## 2020-01-01 RX ADMIN — HYDRALAZINE HYDROCHLORIDE 50 MG: 50 TABLET, FILM COATED ORAL at 15:48

## 2020-01-01 RX ADMIN — LIDOCAINE HYDROCHLORIDE: 20 JELLY TOPICAL at 20:00

## 2020-01-01 RX ADMIN — LINEZOLID 600 MG: 600 INJECTION, SOLUTION INTRAVENOUS at 01:12

## 2020-01-01 RX ADMIN — PAROXETINE HYDROCHLORIDE 20 MG: 20 TABLET, FILM COATED ORAL at 08:37

## 2020-01-01 RX ADMIN — HYDRALAZINE HYDROCHLORIDE 50 MG: 50 TABLET, FILM COATED ORAL at 05:58

## 2020-01-01 RX ADMIN — PAROXETINE HYDROCHLORIDE 20 MG: 20 TABLET, FILM COATED ORAL at 09:24

## 2020-01-01 RX ADMIN — FERROUS SULFATE TAB 325 MG (65 MG ELEMENTAL FE) 325 MG: 325 (65 FE) TAB at 18:04

## 2020-01-01 RX ADMIN — PAROXETINE HYDROCHLORIDE 20 MG: 20 TABLET, FILM COATED ORAL at 08:10

## 2020-01-01 RX ADMIN — TRAMADOL HYDROCHLORIDE 50 MG: 50 TABLET, FILM COATED ORAL at 23:20

## 2020-01-01 RX ADMIN — TRAMADOL HYDROCHLORIDE 50 MG: 50 TABLET, FILM COATED ORAL at 14:48

## 2020-01-01 RX ADMIN — TRAMADOL HYDROCHLORIDE 50 MG: 50 TABLET, FILM COATED ORAL at 17:21

## 2020-01-01 RX ADMIN — SODIUM CHLORIDE, PRESERVATIVE FREE 10 ML: 5 INJECTION INTRAVENOUS at 23:04

## 2020-01-01 RX ADMIN — PAROXETINE HYDROCHLORIDE 20 MG: 20 TABLET, FILM COATED ORAL at 08:28

## 2020-01-01 RX ADMIN — SODIUM CHLORIDE, PRESERVATIVE FREE 10 ML: 5 INJECTION INTRAVENOUS at 21:13

## 2020-01-01 RX ADMIN — Medication 100 MG: at 08:50

## 2020-01-01 RX ADMIN — SODIUM CHLORIDE, PRESERVATIVE FREE 10 ML: 5 INJECTION INTRAVENOUS at 09:10

## 2020-01-01 RX ADMIN — FOLIC ACID 1 MG: 1 TABLET ORAL at 13:24

## 2020-01-01 RX ADMIN — SODIUM CHLORIDE, PRESERVATIVE FREE 10 ML: 5 INJECTION INTRAVENOUS at 09:11

## 2020-01-01 RX ADMIN — FOLIC ACID 1 MG: 1 TABLET ORAL at 09:44

## 2020-01-01 RX ADMIN — AZITHROMYCIN MONOHYDRATE 500 MG: 500 INJECTION, POWDER, LYOPHILIZED, FOR SOLUTION INTRAVENOUS at 14:59

## 2020-01-01 RX ADMIN — PAROXETINE HYDROCHLORIDE 20 MG: 20 TABLET, FILM COATED ORAL at 08:50

## 2020-01-01 RX ADMIN — Medication 100 MG: at 08:12

## 2020-01-01 RX ADMIN — SODIUM CHLORIDE, PRESERVATIVE FREE 10 ML: 5 INJECTION INTRAVENOUS at 09:13

## 2020-01-01 RX ADMIN — HYDRALAZINE HYDROCHLORIDE 50 MG: 50 TABLET, FILM COATED ORAL at 13:40

## 2020-01-01 RX ADMIN — METOPROLOL TARTRATE 100 MG: 50 TABLET, FILM COATED ORAL at 08:52

## 2020-01-01 RX ADMIN — Medication 100 MG: at 20:18

## 2020-01-01 RX ADMIN — LORAZEPAM 2 MG: 1 TABLET ORAL at 11:41

## 2020-01-01 RX ADMIN — CEFEPIME HYDROCHLORIDE 1 G: 2 INJECTION, POWDER, FOR SOLUTION INTRAVENOUS at 14:13

## 2020-01-01 RX ADMIN — VANCOMYCIN HYDROCHLORIDE 1250 MG: 5 INJECTION, POWDER, LYOPHILIZED, FOR SOLUTION INTRAVENOUS at 10:58

## 2020-01-01 RX ADMIN — HYDRALAZINE HYDROCHLORIDE 50 MG: 50 TABLET ORAL at 05:31

## 2020-01-01 RX ADMIN — Medication 100 MG: at 08:28

## 2020-01-01 RX ADMIN — SODIUM CHLORIDE, PRESERVATIVE FREE 10 ML: 5 INJECTION INTRAVENOUS at 21:03

## 2020-01-01 RX ADMIN — MORPHINE SULFATE 2 MG: 2 INJECTION, SOLUTION INTRAMUSCULAR; INTRAVENOUS at 21:43

## 2020-01-01 RX ADMIN — FOLIC ACID 1 MG: 1 TABLET ORAL at 08:37

## 2020-01-01 RX ADMIN — FERROUS SULFATE TAB 325 MG (65 MG ELEMENTAL FE) 325 MG: 325 (65 FE) TAB at 08:47

## 2020-01-01 RX ADMIN — METOPROLOL TARTRATE 100 MG: 50 TABLET, FILM COATED ORAL at 21:39

## 2020-01-01 RX ADMIN — SODIUM CHLORIDE, PRESERVATIVE FREE 10 ML: 5 INJECTION INTRAVENOUS at 21:51

## 2020-01-01 RX ADMIN — HYDRALAZINE HYDROCHLORIDE 50 MG: 50 TABLET, FILM COATED ORAL at 05:14

## 2020-01-01 RX ADMIN — HYDRALAZINE HYDROCHLORIDE 50 MG: 50 TABLET, FILM COATED ORAL at 04:52

## 2020-01-01 RX ADMIN — HEPARIN SODIUM 5000 UNITS: 5000 INJECTION, SOLUTION INTRAVENOUS; SUBCUTANEOUS at 15:58

## 2020-01-01 RX ADMIN — METOPROLOL TARTRATE 100 MG: 50 TABLET, FILM COATED ORAL at 21:57

## 2020-01-01 RX ADMIN — SODIUM CHLORIDE, PRESERVATIVE FREE 10 ML: 5 INJECTION INTRAVENOUS at 08:28

## 2020-01-01 RX ADMIN — SODIUM CHLORIDE, PRESERVATIVE FREE 10 ML: 5 INJECTION INTRAVENOUS at 09:05

## 2020-01-01 RX ADMIN — GABAPENTIN 300 MG: 300 CAPSULE ORAL at 18:40

## 2020-01-01 RX ADMIN — LORAZEPAM 1 MG: 1 TABLET ORAL at 18:46

## 2020-01-01 RX ADMIN — PAROXETINE HYDROCHLORIDE 20 MG: 20 TABLET, FILM COATED ORAL at 08:15

## 2020-01-01 RX ADMIN — FOLIC ACID 1 MG: 1 TABLET ORAL at 08:50

## 2020-01-01 RX ADMIN — SODIUM CHLORIDE, PRESERVATIVE FREE 10 ML: 5 INJECTION INTRAVENOUS at 21:29

## 2020-01-01 RX ADMIN — SODIUM BICARBONATE 650 MG TABLET 650 MG: at 17:20

## 2020-01-01 RX ADMIN — FOLIC ACID 1 MG: 1 TABLET ORAL at 08:46

## 2020-01-01 RX ADMIN — TRAMADOL HYDROCHLORIDE 50 MG: 50 TABLET, FILM COATED ORAL at 06:16

## 2020-01-01 RX ADMIN — ASCORBIC ACID, VITAMIN A PALMITATE, CHOLECALCIFEROL, THIAMINE HYDROCHLORIDE, RIBOFLAVIN-5 PHOSPHATE SODIUM, PYRIDOXINE HYDROCHLORIDE, NIACINAMIDE, DEXPANTHENOL, ALPHA-TOCOPHEROL ACETATE, VITAMIN K1, FOLIC ACID, BIOTIN, CYANOCOBALAMIN: 200; 3300; 200; 6; 3.6; 6; 40; 15; 10; 150; 600; 60; 5 INJECTION, SOLUTION INTRAVENOUS at 18:30

## 2020-01-01 RX ADMIN — HEPARIN SODIUM 5000 UNITS: 5000 INJECTION, SOLUTION INTRAVENOUS; SUBCUTANEOUS at 05:47

## 2020-01-01 RX ADMIN — ONDANSETRON HYDROCHLORIDE 4 MG: 2 SOLUTION INTRAMUSCULAR; INTRAVENOUS at 04:12

## 2020-01-01 RX ADMIN — MULTIPLE VITAMINS W/ MINERALS TAB 1 TABLET: TAB at 20:18

## 2020-01-01 RX ADMIN — LORAZEPAM 3 MG: 2 INJECTION INTRAMUSCULAR; INTRAVENOUS at 20:50

## 2020-01-01 RX ADMIN — PANTOPRAZOLE SODIUM 40 MG: 40 INJECTION, POWDER, FOR SOLUTION INTRAVENOUS at 08:19

## 2020-01-01 RX ADMIN — THERA TABS 1 TABLET: TAB at 08:50

## 2020-01-01 RX ADMIN — Medication 100 MG: at 09:46

## 2020-01-01 RX ADMIN — LISINOPRIL 10 MG: 10 TABLET ORAL at 09:34

## 2020-01-01 RX ADMIN — SODIUM CHLORIDE, PRESERVATIVE FREE 10 ML: 5 INJECTION INTRAVENOUS at 10:16

## 2020-01-01 RX ADMIN — MORPHINE SULFATE 2 MG: 2 INJECTION, SOLUTION INTRAMUSCULAR; INTRAVENOUS at 05:48

## 2020-01-01 RX ADMIN — TRAMADOL HYDROCHLORIDE 50 MG: 50 TABLET, FILM COATED ORAL at 16:14

## 2020-01-01 RX ADMIN — FIDAXOMICIN 200 MG: 200 TABLET, FILM COATED ORAL at 21:53

## 2020-01-01 RX ADMIN — POTASSIUM CHLORIDE 20 MEQ: 1500 TABLET, EXTENDED RELEASE ORAL at 09:56

## 2020-01-01 RX ADMIN — HEPARIN SODIUM 5000 UNITS: 5000 INJECTION, SOLUTION INTRAVENOUS; SUBCUTANEOUS at 21:53

## 2020-01-01 RX ADMIN — OXYCODONE HYDROCHLORIDE 5 MG: 5 TABLET ORAL at 06:26

## 2020-01-01 RX ADMIN — TRAMADOL HYDROCHLORIDE 50 MG: 50 TABLET, FILM COATED ORAL at 10:14

## 2020-01-01 RX ADMIN — FIDAXOMICIN 200 MG: 200 TABLET, FILM COATED ORAL at 09:23

## 2020-01-01 RX ADMIN — FIDAXOMICIN 200 MG: 200 TABLET, FILM COATED ORAL at 21:13

## 2020-01-01 RX ADMIN — HYDRALAZINE HYDROCHLORIDE 50 MG: 50 TABLET, FILM COATED ORAL at 21:13

## 2020-01-01 RX ADMIN — SODIUM CHLORIDE, PRESERVATIVE FREE 10 ML: 5 INJECTION INTRAVENOUS at 20:26

## 2020-01-01 RX ADMIN — FERROUS SULFATE TAB 325 MG (65 MG ELEMENTAL FE) 325 MG: 325 (65 FE) TAB at 18:38

## 2020-01-01 RX ADMIN — HYDRALAZINE HYDROCHLORIDE 50 MG: 50 TABLET, FILM COATED ORAL at 05:26

## 2020-01-01 RX ADMIN — FERROUS SULFATE TAB 325 MG (65 MG ELEMENTAL FE) 325 MG: 325 (65 FE) TAB at 11:31

## 2020-01-01 RX ADMIN — THERA TABS 1 TABLET: TAB at 08:25

## 2020-01-01 RX ADMIN — INSULIN LISPRO 2 UNITS: 100 INJECTION, SOLUTION INTRAVENOUS; SUBCUTANEOUS at 01:28

## 2020-01-01 RX ADMIN — CEFEPIME HYDROCHLORIDE 1 G: 1 INJECTION, POWDER, FOR SOLUTION INTRAMUSCULAR; INTRAVENOUS at 12:57

## 2020-01-01 RX ADMIN — PAROXETINE HYDROCHLORIDE 20 MG: 20 TABLET, FILM COATED ORAL at 10:53

## 2020-01-01 RX ADMIN — SODIUM CHLORIDE: 9 INJECTION, SOLUTION INTRAVENOUS at 09:44

## 2020-01-01 RX ADMIN — HEPARIN SODIUM 5000 UNITS: 5000 INJECTION, SOLUTION INTRAVENOUS; SUBCUTANEOUS at 15:04

## 2020-01-01 RX ADMIN — POTASSIUM CHLORIDE 20 MEQ: 1500 TABLET, EXTENDED RELEASE ORAL at 18:03

## 2020-01-01 RX ADMIN — LORAZEPAM 1 MG: 2 INJECTION, SOLUTION INTRAMUSCULAR; INTRAVENOUS at 16:07

## 2020-01-01 RX ADMIN — SODIUM CHLORIDE, PRESERVATIVE FREE 10 ML: 5 INJECTION INTRAVENOUS at 21:28

## 2020-01-01 RX ADMIN — ONDANSETRON HYDROCHLORIDE 4 MG: 2 SOLUTION INTRAMUSCULAR; INTRAVENOUS at 12:05

## 2020-01-01 RX ADMIN — LINEZOLID 600 MG: 600 INJECTION, SOLUTION INTRAVENOUS at 01:59

## 2020-01-01 RX ADMIN — SODIUM CHLORIDE: 9 INJECTION, SOLUTION INTRAVENOUS at 04:47

## 2020-01-01 RX ADMIN — FIDAXOMICIN 200 MG: 200 TABLET, FILM COATED ORAL at 13:56

## 2020-01-01 RX ADMIN — Medication 100 MG: at 09:04

## 2020-01-01 RX ADMIN — VANCOMYCIN HYDROCHLORIDE 1250 MG: 5 INJECTION, POWDER, LYOPHILIZED, FOR SOLUTION INTRAVENOUS at 15:58

## 2020-01-01 RX ADMIN — POTASSIUM CHLORIDE 20 MEQ: 1500 TABLET, EXTENDED RELEASE ORAL at 21:13

## 2020-01-01 RX ADMIN — HEPARIN SODIUM 5000 UNITS: 5000 INJECTION, SOLUTION INTRAVENOUS; SUBCUTANEOUS at 22:10

## 2020-01-01 RX ADMIN — SODIUM CHLORIDE, PRESERVATIVE FREE 10 ML: 5 INJECTION INTRAVENOUS at 21:59

## 2020-01-01 RX ADMIN — SODIUM CHLORIDE, PRESERVATIVE FREE 10 ML: 5 INJECTION INTRAVENOUS at 19:48

## 2020-01-01 RX ADMIN — SODIUM CHLORIDE, PRESERVATIVE FREE 10 ML: 5 INJECTION INTRAVENOUS at 08:48

## 2020-01-01 RX ADMIN — ENOXAPARIN SODIUM 40 MG: 40 INJECTION SUBCUTANEOUS at 08:47

## 2020-01-01 RX ADMIN — AMLODIPINE BESYLATE 10 MG: 10 TABLET ORAL at 09:56

## 2020-01-01 RX ADMIN — TIOTROPIUM BROMIDE INHALATION SPRAY 2 PUFF: 3.12 SPRAY, METERED RESPIRATORY (INHALATION) at 08:19

## 2020-01-01 RX ADMIN — FERROUS SULFATE TAB 325 MG (65 MG ELEMENTAL FE) 325 MG: 325 (65 FE) TAB at 16:30

## 2020-01-01 RX ADMIN — FERROUS SULFATE TAB 325 MG (65 MG ELEMENTAL FE) 325 MG: 325 (65 FE) TAB at 17:58

## 2020-01-01 RX ADMIN — Medication 100 MG: at 08:53

## 2020-01-01 RX ADMIN — CARVEDILOL 25 MG: 25 TABLET, FILM COATED ORAL at 08:27

## 2020-01-01 RX ADMIN — TIOTROPIUM BROMIDE INHALATION SPRAY 2 PUFF: 3.12 SPRAY, METERED RESPIRATORY (INHALATION) at 07:20

## 2020-01-01 RX ADMIN — INSULIN LISPRO 2 UNITS: 100 INJECTION, SOLUTION INTRAVENOUS; SUBCUTANEOUS at 10:15

## 2020-01-01 RX ADMIN — SODIUM CHLORIDE, PRESERVATIVE FREE 10 ML: 5 INJECTION INTRAVENOUS at 09:50

## 2020-01-01 RX ADMIN — FERROUS SULFATE TAB 325 MG (65 MG ELEMENTAL FE) 325 MG: 325 (65 FE) TAB at 18:07

## 2020-01-01 RX ADMIN — TOLVAPTAN 15 MG: 15 TABLET ORAL at 22:04

## 2020-01-01 RX ADMIN — PAROXETINE HYDROCHLORIDE 20 MG: 20 TABLET, FILM COATED ORAL at 08:45

## 2020-01-01 RX ADMIN — POTASSIUM CHLORIDE 10 MEQ: 7.46 INJECTION, SOLUTION INTRAVENOUS at 01:10

## 2020-01-01 RX ADMIN — FOLIC ACID 1 MG: 1 TABLET ORAL at 11:30

## 2020-01-01 RX ADMIN — ACETAMINOPHEN 650 MG: 325 TABLET ORAL at 10:26

## 2020-01-01 RX ADMIN — HYDRALAZINE HYDROCHLORIDE 50 MG: 50 TABLET, FILM COATED ORAL at 15:32

## 2020-01-01 RX ADMIN — HEPARIN SODIUM 5000 UNITS: 5000 INJECTION, SOLUTION INTRAVENOUS; SUBCUTANEOUS at 13:24

## 2020-01-01 RX ADMIN — SODIUM BICARBONATE 650 MG TABLET 1300 MG: at 21:16

## 2020-01-01 RX ADMIN — FOLIC ACID 1 MG: 1 TABLET ORAL at 08:52

## 2020-01-01 RX ADMIN — METOPROLOL TARTRATE 100 MG: 50 TABLET, FILM COATED ORAL at 20:55

## 2020-01-01 RX ADMIN — GABAPENTIN 600 MG: 300 CAPSULE ORAL at 10:02

## 2020-01-01 RX ADMIN — METOPROLOL TARTRATE 50 MG: 50 TABLET, FILM COATED ORAL at 21:11

## 2020-01-01 RX ADMIN — LORAZEPAM 1 MG: 2 INJECTION INTRAMUSCULAR; INTRAVENOUS at 05:39

## 2020-01-01 RX ADMIN — SODIUM CHLORIDE, PRESERVATIVE FREE 10 ML: 5 INJECTION INTRAVENOUS at 10:06

## 2020-01-01 RX ADMIN — AMLODIPINE BESYLATE 5 MG: 5 TABLET ORAL at 08:53

## 2020-01-01 RX ADMIN — HYDRALAZINE HYDROCHLORIDE 50 MG: 50 TABLET, FILM COATED ORAL at 06:21

## 2020-01-01 RX ADMIN — POTASSIUM CHLORIDE 20 MEQ: 1500 TABLET, EXTENDED RELEASE ORAL at 13:28

## 2020-01-01 RX ADMIN — FERROUS SULFATE TAB 325 MG (65 MG ELEMENTAL FE) 325 MG: 325 (65 FE) TAB at 09:57

## 2020-01-01 RX ADMIN — HYDRALAZINE HYDROCHLORIDE 50 MG: 50 TABLET ORAL at 21:35

## 2020-01-01 RX ADMIN — METOPROLOL TARTRATE 100 MG: 50 TABLET, FILM COATED ORAL at 08:45

## 2020-01-01 RX ADMIN — SODIUM CHLORIDE, PRESERVATIVE FREE 10 ML: 5 INJECTION INTRAVENOUS at 09:48

## 2020-01-01 RX ADMIN — LORAZEPAM 1 MG: 1 TABLET ORAL at 21:07

## 2020-01-01 RX ADMIN — POTASSIUM CHLORIDE 10 MEQ: 7.46 INJECTION, SOLUTION INTRAVENOUS at 21:36

## 2020-01-01 RX ADMIN — HYDRALAZINE HYDROCHLORIDE 50 MG: 50 TABLET, FILM COATED ORAL at 16:50

## 2020-01-01 RX ADMIN — HEPARIN SODIUM 5000 UNITS: 5000 INJECTION, SOLUTION INTRAVENOUS; SUBCUTANEOUS at 14:49

## 2020-01-01 RX ADMIN — PAROXETINE HYDROCHLORIDE 20 MG: 20 TABLET, FILM COATED ORAL at 08:13

## 2020-01-01 RX ADMIN — PHENOBARBITAL SODIUM 65 MG: 65 INJECTION INTRAMUSCULAR at 23:12

## 2020-01-01 RX ADMIN — SODIUM CHLORIDE, PRESERVATIVE FREE 10 ML: 5 INJECTION INTRAVENOUS at 21:20

## 2020-01-01 RX ADMIN — FIDAXOMICIN 200 MG: 200 TABLET, FILM COATED ORAL at 09:28

## 2020-01-01 RX ADMIN — TRAMADOL HYDROCHLORIDE 50 MG: 50 TABLET, FILM COATED ORAL at 16:54

## 2020-01-01 RX ADMIN — HYDRALAZINE HYDROCHLORIDE 50 MG: 50 TABLET ORAL at 13:32

## 2020-01-01 RX ADMIN — FERROUS SULFATE TAB 325 MG (65 MG ELEMENTAL FE) 325 MG: 325 (65 FE) TAB at 18:03

## 2020-01-01 RX ADMIN — POTASSIUM CHLORIDE 10 MEQ: 7.46 INJECTION, SOLUTION INTRAVENOUS at 10:04

## 2020-01-01 RX ADMIN — ENOXAPARIN SODIUM 40 MG: 40 INJECTION SUBCUTANEOUS at 10:04

## 2020-01-01 RX ADMIN — PANTOPRAZOLE SODIUM 40 MG: 40 TABLET, DELAYED RELEASE ORAL at 06:44

## 2020-01-01 RX ADMIN — TRAMADOL HYDROCHLORIDE 50 MG: 50 TABLET, FILM COATED ORAL at 01:43

## 2020-01-01 RX ADMIN — SODIUM CHLORIDE: 9 INJECTION, SOLUTION INTRAVENOUS at 20:01

## 2020-01-01 RX ADMIN — SODIUM CHLORIDE, PRESERVATIVE FREE 10 ML: 5 INJECTION INTRAVENOUS at 21:40

## 2020-01-01 RX ADMIN — TRAMADOL HYDROCHLORIDE 50 MG: 50 TABLET, FILM COATED ORAL at 10:01

## 2020-01-01 RX ADMIN — TRAMADOL HYDROCHLORIDE 50 MG: 50 TABLET, FILM COATED ORAL at 01:23

## 2020-01-01 RX ADMIN — VANCOMYCIN HYDROCHLORIDE 1250 MG: 5 INJECTION, POWDER, LYOPHILIZED, FOR SOLUTION INTRAVENOUS at 15:31

## 2020-01-01 RX ADMIN — DEXTROSE 50 % IN WATER (D50W) INTRAVENOUS SYRINGE 25 G: at 00:18

## 2020-01-01 RX ADMIN — SODIUM CHLORIDE, PRESERVATIVE FREE 10 ML: 5 INJECTION INTRAVENOUS at 21:14

## 2020-01-01 RX ADMIN — SODIUM CHLORIDE, PRESERVATIVE FREE 10 ML: 5 INJECTION INTRAVENOUS at 16:51

## 2020-01-01 RX ADMIN — SODIUM CHLORIDE, PRESERVATIVE FREE 10 ML: 5 INJECTION INTRAVENOUS at 21:08

## 2020-01-01 RX ADMIN — METOPROLOL TARTRATE 100 MG: 50 TABLET, FILM COATED ORAL at 23:03

## 2020-01-01 RX ADMIN — SODIUM CHLORIDE, PRESERVATIVE FREE 10 ML: 5 INJECTION INTRAVENOUS at 08:49

## 2020-01-01 RX ADMIN — HEPARIN SODIUM 5000 UNITS: 5000 INJECTION, SOLUTION INTRAVENOUS; SUBCUTANEOUS at 22:02

## 2020-01-01 RX ADMIN — HEPARIN SODIUM 5000 UNITS: 5000 INJECTION, SOLUTION INTRAVENOUS; SUBCUTANEOUS at 16:57

## 2020-01-01 RX ADMIN — LORAZEPAM 1 MG: 1 TABLET ORAL at 12:21

## 2020-01-01 RX ADMIN — SODIUM BICARBONATE 650 MG TABLET 650 MG: at 10:13

## 2020-01-01 RX ADMIN — SODIUM BICARBONATE: 84 INJECTION, SOLUTION INTRAVENOUS at 08:47

## 2020-01-01 RX ADMIN — FOLIC ACID 1 MG: 1 TABLET ORAL at 08:27

## 2020-01-01 RX ADMIN — SODIUM BICARBONATE 650 MG TABLET 1300 MG: at 19:48

## 2020-01-01 RX ADMIN — ONDANSETRON HYDROCHLORIDE 4 MG: 2 SOLUTION INTRAMUSCULAR; INTRAVENOUS at 03:16

## 2020-01-01 RX ADMIN — CARVEDILOL 25 MG: 25 TABLET, FILM COATED ORAL at 16:54

## 2020-01-01 RX ADMIN — LORAZEPAM 1 MG: 1 TABLET ORAL at 02:02

## 2020-01-01 RX ADMIN — CARVEDILOL 25 MG: 25 TABLET, FILM COATED ORAL at 17:53

## 2020-01-01 RX ADMIN — LISINOPRIL 10 MG: 10 TABLET ORAL at 10:53

## 2020-01-01 RX ADMIN — SODIUM CHLORIDE, PRESERVATIVE FREE 10 ML: 5 INJECTION INTRAVENOUS at 11:16

## 2020-01-01 RX ADMIN — FOLIC ACID 1 MG: 1 TABLET ORAL at 10:04

## 2020-01-01 RX ADMIN — ENOXAPARIN SODIUM 40 MG: 40 INJECTION SUBCUTANEOUS at 09:38

## 2020-01-01 RX ADMIN — LORAZEPAM 1 MG: 1 TABLET ORAL at 21:42

## 2020-01-01 RX ADMIN — HEPARIN SODIUM 5000 UNITS: 5000 INJECTION, SOLUTION INTRAVENOUS; SUBCUTANEOUS at 21:11

## 2020-01-01 RX ADMIN — TRAMADOL HYDROCHLORIDE 50 MG: 50 TABLET, FILM COATED ORAL at 11:32

## 2020-01-01 RX ADMIN — Medication 100 MG: at 08:10

## 2020-01-01 RX ADMIN — PAROXETINE HYDROCHLORIDE 20 MG: 20 TABLET, FILM COATED ORAL at 10:04

## 2020-01-01 RX ADMIN — CEFEPIME 2 G: 20 INJECTION, POWDER, FOR SOLUTION INTRAVENOUS at 08:18

## 2020-01-01 RX ADMIN — MORPHINE SULFATE 2 MG: 2 INJECTION, SOLUTION INTRAMUSCULAR; INTRAVENOUS at 09:10

## 2020-01-01 RX ADMIN — LORAZEPAM 1 MG: 1 TABLET ORAL at 21:05

## 2020-01-01 RX ADMIN — LINEZOLID 600 MG: 600 INJECTION, SOLUTION INTRAVENOUS at 15:39

## 2020-01-01 RX ADMIN — TRAMADOL HYDROCHLORIDE 50 MG: 50 TABLET, FILM COATED ORAL at 14:38

## 2020-01-01 RX ADMIN — CEFEPIME HYDROCHLORIDE 1 G: 2 INJECTION, POWDER, FOR SOLUTION INTRAVENOUS at 00:43

## 2020-01-01 RX ADMIN — POTASSIUM CHLORIDE 10 MEQ: 7.46 INJECTION, SOLUTION INTRAVENOUS at 23:53

## 2020-01-01 RX ADMIN — FOLIC ACID 1 MG: 1 TABLET ORAL at 09:04

## 2020-01-01 RX ADMIN — SODIUM BICARBONATE 650 MG TABLET 1300 MG: at 21:56

## 2020-01-01 RX ADMIN — SODIUM CHLORIDE, PRESERVATIVE FREE 10 ML: 5 INJECTION INTRAVENOUS at 11:31

## 2020-01-01 RX ADMIN — SODIUM CHLORIDE, PRESERVATIVE FREE 10 ML: 5 INJECTION INTRAVENOUS at 13:04

## 2020-01-01 RX ADMIN — CEFEPIME 2 G: 20 INJECTION, POWDER, FOR SOLUTION INTRAVENOUS at 20:50

## 2020-01-01 RX ADMIN — MORPHINE SULFATE 2 MG: 2 INJECTION, SOLUTION INTRAMUSCULAR; INTRAVENOUS at 00:19

## 2020-01-01 RX ADMIN — METOPROLOL TARTRATE 100 MG: 50 TABLET, FILM COATED ORAL at 19:51

## 2020-01-01 RX ADMIN — MULTIPLE VITAMINS W/ MINERALS TAB 1 TABLET: TAB at 10:02

## 2020-01-01 RX ADMIN — HYDRALAZINE HYDROCHLORIDE 50 MG: 50 TABLET, FILM COATED ORAL at 06:16

## 2020-01-01 RX ADMIN — ASCORBIC ACID, VITAMIN A PALMITATE, CHOLECALCIFEROL, THIAMINE HYDROCHLORIDE, RIBOFLAVIN-5 PHOSPHATE SODIUM, PYRIDOXINE HYDROCHLORIDE, NIACINAMIDE, DEXPANTHENOL, ALPHA-TOCOPHEROL ACETATE, VITAMIN K1, FOLIC ACID, BIOTIN, CYANOCOBALAMIN: 200; 3300; 200; 6; 3.6; 6; 40; 15; 10; 150; 600; 60; 5 INJECTION, SOLUTION INTRAVENOUS at 17:33

## 2020-01-01 RX ADMIN — DEXTROSE MONOHYDRATE 1000 ML: 50 INJECTION, SOLUTION INTRAVENOUS at 18:50

## 2020-01-01 RX ADMIN — TRAMADOL HYDROCHLORIDE 50 MG: 50 TABLET, FILM COATED ORAL at 13:12

## 2020-01-01 RX ADMIN — LORAZEPAM 1 MG: 1 TABLET ORAL at 18:11

## 2020-01-01 RX ADMIN — POTASSIUM CHLORIDE 40 MEQ: 1500 TABLET, EXTENDED RELEASE ORAL at 06:39

## 2020-01-01 RX ADMIN — DEXMEDETOMIDINE 0.3 MCG/KG/HR: 100 INJECTION, SOLUTION, CONCENTRATE INTRAVENOUS at 18:56

## 2020-01-01 RX ADMIN — LORAZEPAM 1 MG: 1 TABLET ORAL at 03:05

## 2020-01-01 RX ADMIN — Medication 100 MG: at 08:48

## 2020-01-01 RX ADMIN — TIOTROPIUM BROMIDE INHALATION SPRAY 2 PUFF: 3.12 SPRAY, METERED RESPIRATORY (INHALATION) at 12:04

## 2020-01-01 RX ADMIN — AMLODIPINE BESYLATE 10 MG: 10 TABLET ORAL at 10:03

## 2020-01-01 RX ADMIN — THIAMINE HYDROCHLORIDE 100 MG: 100 INJECTION, SOLUTION INTRAMUSCULAR; INTRAVENOUS at 16:07

## 2020-01-01 RX ADMIN — IOPAMIDOL 100 ML: 755 INJECTION, SOLUTION INTRAVENOUS at 20:00

## 2020-01-01 RX ADMIN — HYDRALAZINE HYDROCHLORIDE 50 MG: 50 TABLET, FILM COATED ORAL at 17:20

## 2020-01-01 RX ADMIN — LORAZEPAM 1 MG: 1 TABLET ORAL at 09:01

## 2020-01-01 RX ADMIN — SODIUM CHLORIDE: 9 INJECTION, SOLUTION INTRAVENOUS at 06:45

## 2020-01-01 RX ADMIN — SODIUM CHLORIDE, PRESERVATIVE FREE 10 ML: 5 INJECTION INTRAVENOUS at 21:42

## 2020-01-01 RX ADMIN — CEFDINIR 300 MG: 300 CAPSULE ORAL at 09:34

## 2020-01-01 RX ADMIN — FIDAXOMICIN 200 MG: 200 TABLET, FILM COATED ORAL at 21:16

## 2020-01-01 RX ADMIN — GABAPENTIN 600 MG: 300 CAPSULE ORAL at 20:18

## 2020-01-01 RX ADMIN — HEPARIN SODIUM 5000 UNITS: 5000 INJECTION, SOLUTION INTRAVENOUS; SUBCUTANEOUS at 05:52

## 2020-01-01 RX ADMIN — CEFEPIME HYDROCHLORIDE 1 G: 2 INJECTION, POWDER, FOR SOLUTION INTRAVENOUS at 11:27

## 2020-01-01 RX ADMIN — POTASSIUM CHLORIDE AND SODIUM CHLORIDE: 900; 300 INJECTION, SOLUTION INTRAVENOUS at 02:57

## 2020-01-01 RX ADMIN — SODIUM BICARBONATE 650 MG TABLET 650 MG: at 20:55

## 2020-01-01 RX ADMIN — FIDAXOMICIN 200 MG: 200 TABLET, FILM COATED ORAL at 08:27

## 2020-01-01 RX ADMIN — SODIUM CHLORIDE, PRESERVATIVE FREE 10 ML: 5 INJECTION INTRAVENOUS at 21:17

## 2020-01-01 RX ADMIN — Medication 100 MG: at 08:27

## 2020-01-01 RX ADMIN — CARVEDILOL 25 MG: 25 TABLET, FILM COATED ORAL at 10:07

## 2020-01-01 RX ADMIN — POTASSIUM CHLORIDE AND SODIUM CHLORIDE: 900; 300 INJECTION, SOLUTION INTRAVENOUS at 23:53

## 2020-01-01 RX ADMIN — GABAPENTIN 600 MG: 300 CAPSULE ORAL at 21:17

## 2020-01-01 RX ADMIN — SODIUM CHLORIDE, PRESERVATIVE FREE 10 ML: 5 INJECTION INTRAVENOUS at 09:25

## 2020-01-01 RX ADMIN — FOLIC ACID 1 MG: 1 TABLET ORAL at 09:46

## 2020-01-01 RX ADMIN — PAROXETINE HYDROCHLORIDE 20 MG: 20 TABLET, FILM COATED ORAL at 10:14

## 2020-01-01 RX ADMIN — SODIUM CHLORIDE 1000 ML: 9 INJECTION, SOLUTION INTRAVENOUS at 14:46

## 2020-01-01 RX ADMIN — CARVEDILOL 25 MG: 25 TABLET, FILM COATED ORAL at 11:31

## 2020-01-01 RX ADMIN — HYDRALAZINE HYDROCHLORIDE 50 MG: 50 TABLET ORAL at 13:49

## 2020-01-01 RX ADMIN — FIDAXOMICIN 200 MG: 200 TABLET, FILM COATED ORAL at 20:53

## 2020-01-01 RX ADMIN — HYDRALAZINE HYDROCHLORIDE 50 MG: 50 TABLET, FILM COATED ORAL at 15:59

## 2020-01-01 RX ADMIN — HYDRALAZINE HYDROCHLORIDE 50 MG: 50 TABLET, FILM COATED ORAL at 05:23

## 2020-01-01 RX ADMIN — SODIUM CHLORIDE, PRESERVATIVE FREE 10 ML: 5 INJECTION INTRAVENOUS at 21:44

## 2020-01-01 RX ADMIN — HYDRALAZINE HYDROCHLORIDE 10 MG: 20 INJECTION INTRAMUSCULAR; INTRAVENOUS at 17:52

## 2020-01-01 RX ADMIN — CEFTRIAXONE SODIUM 250 MG: 250 INJECTION, POWDER, FOR SOLUTION INTRAMUSCULAR; INTRAVENOUS at 11:51

## 2020-01-01 RX ADMIN — SODIUM CHLORIDE, PRESERVATIVE FREE 10 ML: 5 INJECTION INTRAVENOUS at 21:07

## 2020-01-01 RX ADMIN — HYDRALAZINE HYDROCHLORIDE 50 MG: 50 TABLET, FILM COATED ORAL at 21:41

## 2020-01-01 RX ADMIN — SODIUM BICARBONATE 650 MG TABLET 1300 MG: at 16:50

## 2020-01-01 RX ADMIN — CEFDINIR 300 MG: 300 CAPSULE ORAL at 21:28

## 2020-01-01 RX ADMIN — Medication 100 MG: at 09:28

## 2020-01-01 RX ADMIN — FOLIC ACID 1 MG: 1 TABLET ORAL at 08:14

## 2020-01-01 RX ADMIN — SODIUM BICARBONATE 650 MG TABLET 650 MG: at 23:03

## 2020-01-01 RX ADMIN — LORAZEPAM 1 MG: 1 TABLET ORAL at 11:39

## 2020-01-01 RX ADMIN — ENOXAPARIN SODIUM 40 MG: 40 INJECTION SUBCUTANEOUS at 10:53

## 2020-01-01 RX ADMIN — METOPROLOL TARTRATE 100 MG: 50 TABLET, FILM COATED ORAL at 21:14

## 2020-01-01 RX ADMIN — PAROXETINE HYDROCHLORIDE 20 MG: 20 TABLET, FILM COATED ORAL at 09:11

## 2020-01-01 RX ADMIN — LORAZEPAM 1 MG: 1 TABLET ORAL at 23:54

## 2020-01-01 RX ADMIN — AMLODIPINE BESYLATE 5 MG: 5 TABLET ORAL at 17:00

## 2020-01-01 RX ADMIN — TRAMADOL HYDROCHLORIDE 50 MG: 50 TABLET, FILM COATED ORAL at 09:04

## 2020-01-01 RX ADMIN — PAROXETINE HYDROCHLORIDE 20 MG: 20 TABLET, FILM COATED ORAL at 09:28

## 2020-01-01 RX ADMIN — FOLIC ACID 1 MG: 1 TABLET ORAL at 17:58

## 2020-01-01 RX ADMIN — PAROXETINE HYDROCHLORIDE 20 MG: 20 TABLET, FILM COATED ORAL at 09:46

## 2020-01-01 RX ADMIN — LORAZEPAM 1 MG: 1 TABLET ORAL at 10:31

## 2020-01-01 RX ADMIN — POTASSIUM CHLORIDE 40 MEQ: 1500 TABLET, EXTENDED RELEASE ORAL at 09:23

## 2020-01-01 RX ADMIN — LORAZEPAM 4 MG: 2 INJECTION INTRAMUSCULAR; INTRAVENOUS at 08:52

## 2020-01-01 RX ADMIN — GABAPENTIN 300 MG: 300 CAPSULE ORAL at 07:58

## 2020-01-01 RX ADMIN — METOPROLOL TARTRATE 50 MG: 50 TABLET, FILM COATED ORAL at 09:44

## 2020-01-01 RX ADMIN — Medication 100 MG: at 08:45

## 2020-01-01 RX ADMIN — FERROUS SULFATE TAB 325 MG (65 MG ELEMENTAL FE) 325 MG: 325 (65 FE) TAB at 10:54

## 2020-01-01 RX ADMIN — SODIUM CHLORIDE, PRESERVATIVE FREE 10 ML: 5 INJECTION INTRAVENOUS at 09:04

## 2020-01-01 RX ADMIN — TRAMADOL HYDROCHLORIDE 50 MG: 50 TABLET, FILM COATED ORAL at 18:46

## 2020-01-01 RX ADMIN — POTASSIUM CHLORIDE AND SODIUM CHLORIDE: 900; 300 INJECTION, SOLUTION INTRAVENOUS at 17:05

## 2020-01-01 RX ADMIN — Medication 100 MG: at 10:02

## 2020-01-01 RX ADMIN — HYDRALAZINE HYDROCHLORIDE 50 MG: 50 TABLET ORAL at 05:16

## 2020-01-01 RX ADMIN — POTASSIUM CHLORIDE 20 MEQ: 1500 TABLET, EXTENDED RELEASE ORAL at 10:53

## 2020-01-01 RX ADMIN — INSULIN HUMAN 10 UNITS: 100 INJECTION, SOLUTION PARENTERAL at 06:01

## 2020-01-01 RX ADMIN — SODIUM BICARBONATE 650 MG TABLET 1300 MG: at 16:59

## 2020-01-01 RX ADMIN — POTASSIUM CHLORIDE AND SODIUM CHLORIDE: 900; 300 INJECTION, SOLUTION INTRAVENOUS at 08:52

## 2020-01-01 RX ADMIN — PANTOPRAZOLE SODIUM 40 MG: 40 INJECTION, POWDER, FOR SOLUTION INTRAVENOUS at 08:29

## 2020-01-01 RX ADMIN — TIOTROPIUM BROMIDE INHALATION SPRAY 2 PUFF: 3.12 SPRAY, METERED RESPIRATORY (INHALATION) at 08:38

## 2020-01-01 RX ADMIN — PAROXETINE HYDROCHLORIDE 20 MG: 20 TABLET, FILM COATED ORAL at 09:57

## 2020-01-01 RX ADMIN — LORAZEPAM 2 MG: 1 TABLET ORAL at 08:24

## 2020-01-01 RX ADMIN — SODIUM CHLORIDE, PRESERVATIVE FREE 10 ML: 5 INJECTION INTRAVENOUS at 23:55

## 2020-01-01 RX ADMIN — PAROXETINE HYDROCHLORIDE 20 MG: 20 TABLET, FILM COATED ORAL at 08:52

## 2020-01-01 RX ADMIN — SODIUM CHLORIDE: 9 INJECTION, SOLUTION INTRAVENOUS at 08:59

## 2020-01-01 RX ADMIN — SODIUM CHLORIDE, PRESERVATIVE FREE 10 ML: 5 INJECTION INTRAVENOUS at 08:46

## 2020-01-01 RX ADMIN — TIOTROPIUM BROMIDE INHALATION SPRAY 2 PUFF: 3.12 SPRAY, METERED RESPIRATORY (INHALATION) at 08:32

## 2020-01-01 RX ADMIN — IRON SUCROSE 300 MG: 20 INJECTION, SOLUTION INTRAVENOUS at 11:55

## 2020-01-01 RX ADMIN — LISINOPRIL 10 MG: 10 TABLET ORAL at 08:47

## 2020-01-01 RX ADMIN — TRAMADOL HYDROCHLORIDE 50 MG: 50 TABLET, FILM COATED ORAL at 16:55

## 2020-01-01 RX ADMIN — HEPARIN SODIUM 5000 UNITS: 5000 INJECTION, SOLUTION INTRAVENOUS; SUBCUTANEOUS at 22:28

## 2020-01-01 RX ADMIN — TIOTROPIUM BROMIDE INHALATION SPRAY 2 PUFF: 3.12 SPRAY, METERED RESPIRATORY (INHALATION) at 08:24

## 2020-01-01 RX ADMIN — FOLIC ACID: 5 INJECTION, SOLUTION INTRAMUSCULAR; INTRAVENOUS; SUBCUTANEOUS at 21:15

## 2020-01-01 RX ADMIN — HYDRALAZINE HYDROCHLORIDE 50 MG: 50 TABLET, FILM COATED ORAL at 21:24

## 2020-01-01 RX ADMIN — TIOTROPIUM BROMIDE INHALATION SPRAY 2 PUFF: 3.12 SPRAY, METERED RESPIRATORY (INHALATION) at 07:41

## 2020-01-01 RX ADMIN — HEPARIN SODIUM 5000 UNITS: 5000 INJECTION, SOLUTION INTRAVENOUS; SUBCUTANEOUS at 06:42

## 2020-01-01 RX ADMIN — FIDAXOMICIN 200 MG: 200 TABLET, FILM COATED ORAL at 08:51

## 2020-01-01 RX ADMIN — HEPARIN SODIUM 5000 UNITS: 5000 INJECTION, SOLUTION INTRAVENOUS; SUBCUTANEOUS at 05:38

## 2020-01-01 RX ADMIN — LORAZEPAM 1 MG: 1 TABLET ORAL at 16:55

## 2020-01-01 RX ADMIN — PANTOPRAZOLE SODIUM 40 MG: 40 INJECTION, POWDER, FOR SOLUTION INTRAVENOUS at 07:58

## 2020-01-01 RX ADMIN — HYDRALAZINE HYDROCHLORIDE 50 MG: 50 TABLET, FILM COATED ORAL at 21:28

## 2020-01-01 RX ADMIN — POLYETHYLENE GLYCOL 3350 17 G: 17 POWDER, FOR SOLUTION ORAL at 20:56

## 2020-01-01 RX ADMIN — FERROUS SULFATE TAB 325 MG (65 MG ELEMENTAL FE) 325 MG: 325 (65 FE) TAB at 10:31

## 2020-01-01 RX ADMIN — CARVEDILOL 25 MG: 25 TABLET, FILM COATED ORAL at 09:34

## 2020-01-01 RX ADMIN — HYDRALAZINE HYDROCHLORIDE 50 MG: 50 TABLET, FILM COATED ORAL at 04:51

## 2020-01-01 RX ADMIN — CEFTRIAXONE SODIUM 250 MG: 250 INJECTION, POWDER, FOR SOLUTION INTRAMUSCULAR; INTRAVENOUS at 09:32

## 2020-01-01 RX ADMIN — LORAZEPAM 4 MG: 2 INJECTION INTRAMUSCULAR; INTRAVENOUS at 21:40

## 2020-01-01 RX ADMIN — HEPARIN SODIUM 5000 UNITS: 5000 INJECTION, SOLUTION INTRAVENOUS; SUBCUTANEOUS at 20:55

## 2020-01-01 RX ADMIN — Medication 30 MILLICURIE: at 11:15

## 2020-01-01 RX ADMIN — FERROUS SULFATE TAB 325 MG (65 MG ELEMENTAL FE) 325 MG: 325 (65 FE) TAB at 09:23

## 2020-01-01 RX ADMIN — HYDRALAZINE HYDROCHLORIDE 50 MG: 50 TABLET, FILM COATED ORAL at 14:48

## 2020-01-01 RX ADMIN — INSULIN LISPRO 2 UNITS: 100 INJECTION, SOLUTION INTRAVENOUS; SUBCUTANEOUS at 04:39

## 2020-01-01 RX ADMIN — METOPROLOL TARTRATE 100 MG: 50 TABLET, FILM COATED ORAL at 10:13

## 2020-01-01 RX ADMIN — PANTOPRAZOLE SODIUM 40 MG: 40 INJECTION, POWDER, FOR SOLUTION INTRAVENOUS at 09:05

## 2020-01-01 RX ADMIN — SODIUM CHLORIDE, PRESERVATIVE FREE 10 ML: 5 INJECTION INTRAVENOUS at 21:12

## 2020-01-01 RX ADMIN — CEFEPIME 2 G: 20 INJECTION, POWDER, FOR SOLUTION INTRAVENOUS at 21:43

## 2020-01-01 RX ADMIN — SODIUM CHLORIDE 1000 ML: 9 INJECTION, SOLUTION INTRAVENOUS at 20:20

## 2020-01-01 RX ADMIN — TIOTROPIUM BROMIDE INHALATION SPRAY 2 PUFF: 3.12 SPRAY, METERED RESPIRATORY (INHALATION) at 08:15

## 2020-01-01 RX ADMIN — POTASSIUM CHLORIDE 10 MEQ: 7.46 INJECTION, SOLUTION INTRAVENOUS at 11:05

## 2020-01-01 RX ADMIN — GABAPENTIN 300 MG: 300 CAPSULE ORAL at 08:15

## 2020-01-01 RX ADMIN — TRAMADOL HYDROCHLORIDE 50 MG: 50 TABLET, FILM COATED ORAL at 14:23

## 2020-01-01 RX ADMIN — TRAMADOL HYDROCHLORIDE 50 MG: 50 TABLET, FILM COATED ORAL at 06:33

## 2020-01-01 RX ADMIN — INSULIN HUMAN 10 UNITS: 100 INJECTION, SOLUTION PARENTERAL at 00:17

## 2020-01-01 RX ADMIN — GABAPENTIN 300 MG: 300 CAPSULE ORAL at 18:19

## 2020-01-01 RX ADMIN — METOPROLOL TARTRATE 100 MG: 50 TABLET, FILM COATED ORAL at 08:28

## 2020-01-01 RX ADMIN — LORAZEPAM 3 MG: 1 TABLET ORAL at 03:12

## 2020-01-01 RX ADMIN — THERA TABS 1 TABLET: TAB at 08:53

## 2020-01-01 RX ADMIN — ALBUTEROL SULFATE 2.5 MG: 2.5 SOLUTION RESPIRATORY (INHALATION) at 03:26

## 2020-01-01 RX ADMIN — SODIUM BICARBONATE 650 MG TABLET 1300 MG: at 21:14

## 2020-01-01 RX ADMIN — SODIUM CHLORIDE: 9 INJECTION, SOLUTION INTRAVENOUS at 17:21

## 2020-01-01 RX ADMIN — SODIUM CHLORIDE, PRESERVATIVE FREE 10 ML: 5 INJECTION INTRAVENOUS at 20:51

## 2020-01-01 RX ADMIN — SODIUM CHLORIDE, PRESERVATIVE FREE 10 ML: 5 INJECTION INTRAVENOUS at 21:43

## 2020-01-01 RX ADMIN — LORAZEPAM 1 MG: 1 TABLET ORAL at 02:33

## 2020-01-01 RX ADMIN — LORAZEPAM 4 MG: 2 INJECTION INTRAMUSCULAR; INTRAVENOUS at 07:32

## 2020-01-01 RX ADMIN — ACETAMINOPHEN 650 MG: 325 TABLET ORAL at 18:54

## 2020-01-01 RX ADMIN — HYDRALAZINE HYDROCHLORIDE 50 MG: 50 TABLET ORAL at 04:46

## 2020-01-01 RX ADMIN — HEPARIN SODIUM 5000 UNITS: 5000 INJECTION, SOLUTION INTRAVENOUS; SUBCUTANEOUS at 21:16

## 2020-01-01 RX ADMIN — TIOTROPIUM BROMIDE INHALATION SPRAY 2 PUFF: 3.12 SPRAY, METERED RESPIRATORY (INHALATION) at 08:20

## 2020-01-01 RX ADMIN — CEFEPIME 2 G: 20 INJECTION, POWDER, FOR SOLUTION INTRAVENOUS at 19:48

## 2020-01-01 RX ADMIN — PANTOPRAZOLE SODIUM 40 MG: 40 INJECTION, POWDER, FOR SOLUTION INTRAVENOUS at 08:12

## 2020-01-01 RX ADMIN — SODIUM CHLORIDE, PRESERVATIVE FREE 10 ML: 5 INJECTION INTRAVENOUS at 20:56

## 2020-01-01 RX ADMIN — TIOTROPIUM BROMIDE INHALATION SPRAY 2 PUFF: 3.12 SPRAY, METERED RESPIRATORY (INHALATION) at 08:50

## 2020-01-01 RX ADMIN — HYDRALAZINE HYDROCHLORIDE 50 MG: 50 TABLET, FILM COATED ORAL at 20:55

## 2020-01-01 RX ADMIN — CEFEPIME HYDROCHLORIDE 2 G: 2 INJECTION, POWDER, FOR SOLUTION INTRAVENOUS at 02:46

## 2020-01-01 RX ADMIN — HYDRALAZINE HYDROCHLORIDE 50 MG: 50 TABLET, FILM COATED ORAL at 15:04

## 2020-01-01 RX ADMIN — Medication 100 MG: at 10:14

## 2020-01-01 RX ADMIN — LISINOPRIL 10 MG: 10 TABLET ORAL at 21:36

## 2020-01-01 RX ADMIN — POTASSIUM CHLORIDE AND SODIUM CHLORIDE: 900; 300 INJECTION, SOLUTION INTRAVENOUS at 19:34

## 2020-01-01 RX ADMIN — FERROUS SULFATE TAB 325 MG (65 MG ELEMENTAL FE) 325 MG: 325 (65 FE) TAB at 16:54

## 2020-01-01 RX ADMIN — METOPROLOL TARTRATE 100 MG: 50 TABLET, FILM COATED ORAL at 08:12

## 2020-01-01 RX ADMIN — CEFDINIR 300 MG: 300 CAPSULE ORAL at 21:16

## 2020-01-01 RX ADMIN — CARVEDILOL 25 MG: 25 TABLET, FILM COATED ORAL at 08:37

## 2020-01-01 RX ADMIN — HYDRALAZINE HYDROCHLORIDE 50 MG: 50 TABLET, FILM COATED ORAL at 06:04

## 2020-01-01 RX ADMIN — GABAPENTIN 300 MG: 300 CAPSULE ORAL at 18:27

## 2020-01-01 RX ADMIN — SODIUM CHLORIDE, PRESERVATIVE FREE 10 ML: 5 INJECTION INTRAVENOUS at 08:27

## 2020-01-01 RX ADMIN — SODIUM CHLORIDE, PRESERVATIVE FREE 10 ML: 5 INJECTION INTRAVENOUS at 21:09

## 2020-01-01 RX ADMIN — SODIUM CHLORIDE: 9 INJECTION, SOLUTION INTRAVENOUS at 05:00

## 2020-01-01 RX ADMIN — FOLIC ACID 1 MG: 1 TABLET ORAL at 10:03

## 2020-01-01 RX ADMIN — VANCOMYCIN HYDROCHLORIDE 750 MG: 5 INJECTION, POWDER, LYOPHILIZED, FOR SOLUTION INTRAVENOUS at 01:26

## 2020-01-01 RX ADMIN — VANCOMYCIN HYDROCHLORIDE 1000 MG: 1 INJECTION, SOLUTION INTRAVENOUS at 20:24

## 2020-01-01 RX ADMIN — PANTOPRAZOLE SODIUM 40 MG: 40 INJECTION, POWDER, FOR SOLUTION INTRAVENOUS at 09:23

## 2020-01-01 RX ADMIN — HYDRALAZINE HYDROCHLORIDE 50 MG: 50 TABLET ORAL at 14:42

## 2020-01-01 RX ADMIN — DEXTROSE MONOHYDRATE: 50 INJECTION, SOLUTION INTRAVENOUS at 11:51

## 2020-01-01 RX ADMIN — TRAMADOL HYDROCHLORIDE 50 MG: 50 TABLET, FILM COATED ORAL at 08:51

## 2020-01-01 RX ADMIN — CARVEDILOL 25 MG: 25 TABLET, FILM COATED ORAL at 16:36

## 2020-01-01 RX ADMIN — LINEZOLID 600 MG: 600 INJECTION, SOLUTION INTRAVENOUS at 01:18

## 2020-01-01 RX ADMIN — GABAPENTIN 600 MG: 300 CAPSULE ORAL at 19:47

## 2020-01-01 RX ADMIN — LISINOPRIL 10 MG: 10 TABLET ORAL at 09:57

## 2020-01-01 RX ADMIN — TRAMADOL HYDROCHLORIDE 50 MG: 50 TABLET, FILM COATED ORAL at 17:28

## 2020-01-01 RX ADMIN — SODIUM CHLORIDE, PRESERVATIVE FREE 10 ML: 5 INJECTION INTRAVENOUS at 08:19

## 2020-01-01 RX ADMIN — LORAZEPAM 2 MG: 1 TABLET ORAL at 18:18

## 2020-01-01 RX ADMIN — SODIUM CHLORIDE, POTASSIUM CHLORIDE, SODIUM LACTATE AND CALCIUM CHLORIDE: 600; 310; 30; 20 INJECTION, SOLUTION INTRAVENOUS at 18:14

## 2020-01-01 RX ADMIN — TRAMADOL HYDROCHLORIDE 50 MG: 50 TABLET, FILM COATED ORAL at 02:41

## 2020-01-01 RX ADMIN — HEPARIN SODIUM 5000 UNITS: 5000 INJECTION, SOLUTION INTRAVENOUS; SUBCUTANEOUS at 14:47

## 2020-01-01 RX ADMIN — FERROUS SULFATE TAB 325 MG (65 MG ELEMENTAL FE) 325 MG: 325 (65 FE) TAB at 17:53

## 2020-01-01 RX ADMIN — CEFEPIME 2 G: 20 INJECTION, POWDER, FOR SOLUTION INTRAVENOUS at 21:04

## 2020-01-01 RX ADMIN — PANTOPRAZOLE SODIUM 40 MG: 40 INJECTION, POWDER, FOR SOLUTION INTRAVENOUS at 08:48

## 2020-01-01 RX ADMIN — FOLIC ACID 1 MG: 1 TABLET ORAL at 08:13

## 2020-01-01 RX ADMIN — DEXTROSE MONOHYDRATE: 50 INJECTION, SOLUTION INTRAVENOUS at 07:29

## 2020-01-01 RX ADMIN — SODIUM BICARBONATE 650 MG TABLET 1300 MG: at 08:10

## 2020-01-01 RX ADMIN — TIOTROPIUM BROMIDE INHALATION SPRAY 2 PUFF: 3.12 SPRAY, METERED RESPIRATORY (INHALATION) at 08:01

## 2020-01-01 RX ADMIN — PANTOPRAZOLE SODIUM 40 MG: 40 INJECTION, POWDER, FOR SOLUTION INTRAVENOUS at 09:11

## 2020-01-01 RX ADMIN — SODIUM CHLORIDE 1000 ML: 9 INJECTION, SOLUTION INTRAVENOUS at 22:51

## 2020-01-01 RX ADMIN — LINEZOLID 600 MG: 600 INJECTION, SOLUTION INTRAVENOUS at 00:43

## 2020-01-01 RX ADMIN — IRON SUCROSE 300 MG: 20 INJECTION, SOLUTION INTRAVENOUS at 11:27

## 2020-01-01 RX ADMIN — HYDRALAZINE HYDROCHLORIDE 50 MG: 50 TABLET, FILM COATED ORAL at 13:28

## 2020-01-01 RX ADMIN — SODIUM CHLORIDE, PRESERVATIVE FREE 10 ML: 5 INJECTION INTRAVENOUS at 21:39

## 2020-01-01 RX ADMIN — LISINOPRIL 20 MG: 20 TABLET ORAL at 10:03

## 2020-01-01 RX ADMIN — CEFEPIME HYDROCHLORIDE 1 G: 2 INJECTION, POWDER, FOR SOLUTION INTRAVENOUS at 01:24

## 2020-01-01 RX ADMIN — PAROXETINE HYDROCHLORIDE 20 MG: 20 TABLET, FILM COATED ORAL at 09:04

## 2020-01-01 RX ADMIN — SODIUM CHLORIDE, PRESERVATIVE FREE 10 ML: 5 INJECTION INTRAVENOUS at 09:24

## 2020-01-01 RX ADMIN — CEFEPIME 2 G: 20 INJECTION, POWDER, FOR SOLUTION INTRAVENOUS at 08:25

## 2020-01-01 RX ADMIN — LINEZOLID 600 MG: 600 INJECTION, SOLUTION INTRAVENOUS at 13:01

## 2020-01-01 RX ADMIN — METOPROLOL TARTRATE 100 MG: 50 TABLET, FILM COATED ORAL at 08:10

## 2020-01-01 RX ADMIN — LORAZEPAM 4 MG: 2 INJECTION INTRAMUSCULAR; INTRAVENOUS at 05:03

## 2020-01-01 RX ADMIN — LORAZEPAM 1 MG: 1 TABLET ORAL at 23:20

## 2020-01-01 RX ADMIN — Medication 6 MILLICURIE: at 11:20

## 2020-01-01 RX ADMIN — TRAMADOL HYDROCHLORIDE 50 MG: 50 TABLET, FILM COATED ORAL at 16:42

## 2020-01-01 RX ADMIN — THERA TABS 1 TABLET: TAB at 13:24

## 2020-01-01 RX ADMIN — THERA TABS 1 TABLET: TAB at 08:14

## 2020-01-01 RX ADMIN — PHENOBARBITAL SODIUM 65 MG: 65 INJECTION INTRAMUSCULAR at 05:54

## 2020-01-01 RX ADMIN — SODIUM CHLORIDE, PRESERVATIVE FREE 10 ML: 5 INJECTION INTRAVENOUS at 10:54

## 2020-01-01 RX ADMIN — TIOTROPIUM BROMIDE INHALATION SPRAY 2 PUFF: 3.12 SPRAY, METERED RESPIRATORY (INHALATION) at 09:16

## 2020-01-01 RX ADMIN — TRAMADOL HYDROCHLORIDE 50 MG: 50 TABLET, FILM COATED ORAL at 21:14

## 2020-01-01 RX ADMIN — TRAMADOL HYDROCHLORIDE 50 MG: 50 TABLET, FILM COATED ORAL at 23:51

## 2020-01-01 RX ADMIN — CARVEDILOL 25 MG: 25 TABLET, FILM COATED ORAL at 18:04

## 2020-01-01 RX ADMIN — CEFDINIR 300 MG: 300 CAPSULE ORAL at 13:40

## 2020-01-01 RX ADMIN — CEFEPIME 2 G: 20 INJECTION, POWDER, FOR SOLUTION INTRAVENOUS at 08:53

## 2020-01-01 RX ADMIN — HYDRALAZINE HYDROCHLORIDE 50 MG: 50 TABLET, FILM COATED ORAL at 15:00

## 2020-01-01 RX ADMIN — SODIUM CHLORIDE, PRESERVATIVE FREE 10 ML: 5 INJECTION INTRAVENOUS at 22:41

## 2020-01-01 RX ADMIN — PAROXETINE HYDROCHLORIDE 20 MG: 20 TABLET, FILM COATED ORAL at 08:53

## 2020-01-01 RX ADMIN — SODIUM BICARBONATE 650 MG TABLET 1300 MG: at 14:18

## 2020-01-01 RX ADMIN — TRAMADOL HYDROCHLORIDE 50 MG: 50 TABLET, FILM COATED ORAL at 18:43

## 2020-01-01 RX ADMIN — HYDRALAZINE HYDROCHLORIDE 50 MG: 50 TABLET, FILM COATED ORAL at 14:59

## 2020-01-01 RX ADMIN — HYDRALAZINE HYDROCHLORIDE 50 MG: 50 TABLET, FILM COATED ORAL at 05:47

## 2020-01-01 RX ADMIN — CEFEPIME 2 G: 20 INJECTION, POWDER, FOR SOLUTION INTRAVENOUS at 08:28

## 2020-01-01 RX ADMIN — HYDRALAZINE HYDROCHLORIDE 50 MG: 50 TABLET, FILM COATED ORAL at 21:57

## 2020-01-01 RX ADMIN — GABAPENTIN 300 MG: 300 CAPSULE ORAL at 17:33

## 2020-01-01 RX ADMIN — CARVEDILOL 25 MG: 25 TABLET, FILM COATED ORAL at 09:24

## 2020-01-01 RX ADMIN — CEFEPIME 2 G: 20 INJECTION, POWDER, FOR SOLUTION INTRAVENOUS at 21:55

## 2020-01-01 RX ADMIN — METOPROLOL TARTRATE 100 MG: 50 TABLET, FILM COATED ORAL at 21:42

## 2020-01-01 RX ADMIN — HYDRALAZINE HYDROCHLORIDE 50 MG: 50 TABLET, FILM COATED ORAL at 22:28

## 2020-01-01 RX ADMIN — CEFDINIR 300 MG: 300 CAPSULE ORAL at 21:13

## 2020-01-01 RX ADMIN — HYDRALAZINE HYDROCHLORIDE 50 MG: 50 TABLET, FILM COATED ORAL at 21:11

## 2020-01-01 RX ADMIN — METOPROLOL TARTRATE 50 MG: 50 TABLET, FILM COATED ORAL at 08:37

## 2020-01-01 RX ADMIN — MORPHINE SULFATE 4 MG: 4 INJECTION, SOLUTION INTRAMUSCULAR; INTRAVENOUS at 10:55

## 2020-01-01 RX ADMIN — SODIUM CITRATE AND CITRIC ACID MONOHYDRATE 30 ML: 500; 334 SOLUTION ORAL at 08:16

## 2020-01-01 RX ADMIN — MELATONIN TAB 3 MG 3 MG: 3 TAB at 22:27

## 2020-01-01 RX ADMIN — HYDRALAZINE HYDROCHLORIDE 50 MG: 50 TABLET, FILM COATED ORAL at 07:01

## 2020-01-01 RX ADMIN — FOLIC ACID 1 MG: 1 TABLET ORAL at 08:10

## 2020-01-01 RX ADMIN — LORAZEPAM 1 MG: 1 TABLET ORAL at 11:32

## 2020-01-01 RX ADMIN — HYDRALAZINE HYDROCHLORIDE 50 MG: 50 TABLET, FILM COATED ORAL at 23:06

## 2020-01-01 RX ADMIN — SODIUM CHLORIDE, PRESERVATIVE FREE 10 ML: 5 INJECTION INTRAVENOUS at 11:15

## 2020-01-01 RX ADMIN — SODIUM CHLORIDE, PRESERVATIVE FREE 10 ML: 5 INJECTION INTRAVENOUS at 09:26

## 2020-01-01 RX ADMIN — LORAZEPAM 1 MG: 1 TABLET ORAL at 21:46

## 2020-01-01 RX ADMIN — CARVEDILOL 25 MG: 25 TABLET, FILM COATED ORAL at 18:07

## 2020-01-01 RX ADMIN — LORAZEPAM 1 MG: 1 TABLET ORAL at 15:21

## 2020-01-01 RX ADMIN — PANTOPRAZOLE SODIUM 40 MG: 40 INJECTION, POWDER, FOR SOLUTION INTRAVENOUS at 23:37

## 2020-01-01 RX ADMIN — HYDRALAZINE HYDROCHLORIDE 50 MG: 50 TABLET, FILM COATED ORAL at 20:42

## 2020-01-01 RX ADMIN — FOLIC ACID 1 MG: 1 TABLET ORAL at 09:24

## 2020-01-01 RX ADMIN — GABAPENTIN 600 MG: 300 CAPSULE ORAL at 10:54

## 2020-01-01 RX ADMIN — FERROUS SULFATE TAB 325 MG (65 MG ELEMENTAL FE) 325 MG: 325 (65 FE) TAB at 09:28

## 2020-01-01 RX ADMIN — METOPROLOL TARTRATE 100 MG: 50 TABLET, FILM COATED ORAL at 09:04

## 2020-01-01 RX ADMIN — PAROXETINE HYDROCHLORIDE 20 MG: 20 TABLET, FILM COATED ORAL at 08:27

## 2020-01-01 RX ADMIN — FOLIC ACID 1 MG: 1 TABLET ORAL at 08:28

## 2020-01-01 RX ADMIN — FOLIC ACID 1 MG: 1 TABLET ORAL at 08:53

## 2020-01-01 RX ADMIN — METOPROLOL TARTRATE 100 MG: 50 TABLET, FILM COATED ORAL at 09:45

## 2020-01-01 RX ADMIN — CEFEPIME 2 G: 20 INJECTION, POWDER, FOR SOLUTION INTRAVENOUS at 09:10

## 2020-01-01 RX ADMIN — POTASSIUM CHLORIDE AND SODIUM CHLORIDE: 900; 300 INJECTION, SOLUTION INTRAVENOUS at 12:51

## 2020-01-01 RX ADMIN — TIOTROPIUM BROMIDE INHALATION SPRAY 2 PUFF: 3.12 SPRAY, METERED RESPIRATORY (INHALATION) at 08:34

## 2020-01-01 RX ADMIN — TIOTROPIUM BROMIDE INHALATION SPRAY 2 PUFF: 3.12 SPRAY, METERED RESPIRATORY (INHALATION) at 12:28

## 2020-01-01 RX ADMIN — FIDAXOMICIN 200 MG: 200 TABLET, FILM COATED ORAL at 11:30

## 2020-01-01 RX ADMIN — CARVEDILOL 25 MG: 25 TABLET, FILM COATED ORAL at 09:57

## 2020-01-01 RX ADMIN — SODIUM CHLORIDE, PRESERVATIVE FREE 10 ML: 5 INJECTION INTRAVENOUS at 09:46

## 2020-01-01 RX ADMIN — Medication 10 MILLICURIE: at 10:05

## 2020-01-01 RX ADMIN — LORAZEPAM 2 MG: 1 TABLET ORAL at 20:29

## 2020-01-01 RX ADMIN — POTASSIUM CHLORIDE 10 MEQ: 7.46 INJECTION, SOLUTION INTRAVENOUS at 12:08

## 2020-01-01 RX ADMIN — TRAMADOL HYDROCHLORIDE 50 MG: 50 TABLET, FILM COATED ORAL at 03:05

## 2020-01-01 RX ADMIN — HYDRALAZINE HYDROCHLORIDE 50 MG: 50 TABLET ORAL at 15:51

## 2020-01-01 RX ADMIN — PAROXETINE HYDROCHLORIDE 20 MG: 20 TABLET, FILM COATED ORAL at 09:34

## 2020-01-01 RX ADMIN — HYDRALAZINE HYDROCHLORIDE 50 MG: 50 TABLET, FILM COATED ORAL at 21:07

## 2020-01-01 RX ADMIN — CEFEPIME 2 G: 20 INJECTION, POWDER, FOR SOLUTION INTRAVENOUS at 21:18

## 2020-01-01 RX ADMIN — SODIUM CHLORIDE: 9 INJECTION, SOLUTION INTRAVENOUS at 06:55

## 2020-01-01 RX ADMIN — FOLIC ACID 1 MG: 1 TABLET ORAL at 07:59

## 2020-01-01 RX ADMIN — PANTOPRAZOLE SODIUM 40 MG: 40 INJECTION, POWDER, FOR SOLUTION INTRAVENOUS at 08:27

## 2020-01-01 RX ADMIN — FIDAXOMICIN 200 MG: 200 TABLET, FILM COATED ORAL at 12:21

## 2020-01-01 RX ADMIN — CARVEDILOL 25 MG: 25 TABLET, FILM COATED ORAL at 09:28

## 2020-01-01 RX ADMIN — FOLIC ACID: 5 INJECTION, SOLUTION INTRAMUSCULAR; INTRAVENOUS; SUBCUTANEOUS at 20:27

## 2020-01-01 RX ADMIN — TRAMADOL HYDROCHLORIDE 50 MG: 50 TABLET, FILM COATED ORAL at 09:28

## 2020-01-01 RX ADMIN — Medication 100 MG: at 09:44

## 2020-01-01 RX ADMIN — FUROSEMIDE 40 MG: 10 INJECTION, SOLUTION INTRAMUSCULAR; INTRAVENOUS at 18:08

## 2020-01-01 RX ADMIN — PANTOPRAZOLE SODIUM 40 MG: 40 INJECTION, POWDER, FOR SOLUTION INTRAVENOUS at 08:33

## 2020-01-01 RX ADMIN — CARVEDILOL 25 MG: 25 TABLET, FILM COATED ORAL at 21:35

## 2020-01-01 RX ADMIN — HYDRALAZINE HYDROCHLORIDE 50 MG: 50 TABLET, FILM COATED ORAL at 14:46

## 2020-01-01 RX ADMIN — PAROXETINE HYDROCHLORIDE 20 MG: 20 TABLET, FILM COATED ORAL at 09:44

## 2020-01-01 RX ADMIN — VANCOMYCIN HYDROCHLORIDE 1250 MG: 5 INJECTION, POWDER, LYOPHILIZED, FOR SOLUTION INTRAVENOUS at 13:43

## 2020-01-01 RX ADMIN — TRAMADOL HYDROCHLORIDE 50 MG: 50 TABLET, FILM COATED ORAL at 08:54

## 2020-01-01 RX ADMIN — GABAPENTIN 300 MG: 300 CAPSULE ORAL at 18:50

## 2020-01-01 RX ADMIN — FERROUS SULFATE TAB 325 MG (65 MG ELEMENTAL FE) 325 MG: 325 (65 FE) TAB at 08:38

## 2020-01-01 RX ADMIN — CEFDINIR 300 MG: 300 CAPSULE ORAL at 08:37

## 2020-01-01 RX ADMIN — DEXTROSE MONOHYDRATE: 50 INJECTION, SOLUTION INTRAVENOUS at 07:58

## 2020-01-01 RX ADMIN — SODIUM CHLORIDE, PRESERVATIVE FREE 10 ML: 5 INJECTION INTRAVENOUS at 20:43

## 2020-01-01 RX ADMIN — TIOTROPIUM BROMIDE INHALATION SPRAY 2 PUFF: 3.12 SPRAY, METERED RESPIRATORY (INHALATION) at 08:42

## 2020-01-01 RX ADMIN — HEPARIN SODIUM 5000 UNITS: 5000 INJECTION, SOLUTION INTRAVENOUS; SUBCUTANEOUS at 06:26

## 2020-01-01 RX ADMIN — PANTOPRAZOLE SODIUM 40 MG: 40 INJECTION, POWDER, FOR SOLUTION INTRAVENOUS at 09:46

## 2020-01-01 RX ADMIN — CEFEPIME 2 G: 20 INJECTION, POWDER, FOR SOLUTION INTRAVENOUS at 08:59

## 2020-01-01 RX ADMIN — THIAMINE HYDROCHLORIDE 100 MG: 100 INJECTION, SOLUTION INTRAMUSCULAR; INTRAVENOUS at 00:19

## 2020-01-01 RX ADMIN — PANTOPRAZOLE SODIUM 40 MG: 40 INJECTION, POWDER, FOR SOLUTION INTRAVENOUS at 10:15

## 2020-01-01 RX ADMIN — HEPARIN SODIUM 5000 UNITS: 5000 INJECTION, SOLUTION INTRAVENOUS; SUBCUTANEOUS at 13:43

## 2020-01-01 RX ADMIN — SODIUM CHLORIDE, PRESERVATIVE FREE 10 ML: 5 INJECTION INTRAVENOUS at 08:13

## 2020-01-01 RX ADMIN — REGADENOSON 0.4 MG: 0.08 INJECTION, SOLUTION INTRAVENOUS at 11:11

## 2020-01-01 RX ADMIN — HYDRALAZINE HYDROCHLORIDE 50 MG: 50 TABLET, FILM COATED ORAL at 12:45

## 2020-01-01 RX ADMIN — HYDRALAZINE HYDROCHLORIDE 50 MG: 50 TABLET, FILM COATED ORAL at 13:43

## 2020-01-01 RX ADMIN — HYDRALAZINE HYDROCHLORIDE 50 MG: 50 TABLET, FILM COATED ORAL at 05:54

## 2020-01-01 RX ADMIN — SODIUM CHLORIDE, PRESERVATIVE FREE 10 ML: 5 INJECTION INTRAVENOUS at 07:59

## 2020-01-01 RX ADMIN — AMLODIPINE BESYLATE 5 MG: 5 TABLET ORAL at 08:14

## 2020-01-01 RX ADMIN — DEXMEDETOMIDINE 0.2 MCG/KG/HR: 100 INJECTION, SOLUTION, CONCENTRATE INTRAVENOUS at 12:08

## 2020-01-01 RX ADMIN — ONDANSETRON HYDROCHLORIDE 4 MG: 2 SOLUTION INTRAMUSCULAR; INTRAVENOUS at 21:19

## 2020-01-01 RX ADMIN — FOLIC ACID 1 MG: 1 TABLET ORAL at 09:11

## 2020-01-01 RX ADMIN — TRAMADOL HYDROCHLORIDE 50 MG: 50 TABLET, FILM COATED ORAL at 21:46

## 2020-01-01 RX ADMIN — FIDAXOMICIN 200 MG: 200 TABLET, FILM COATED ORAL at 21:28

## 2020-01-01 RX ADMIN — AMLODIPINE BESYLATE 10 MG: 10 TABLET ORAL at 09:32

## 2020-01-01 RX ADMIN — FUROSEMIDE 40 MG: 10 INJECTION, SOLUTION INTRAMUSCULAR; INTRAVENOUS at 07:58

## 2020-01-01 RX ADMIN — LORAZEPAM 1 MG: 1 TABLET ORAL at 20:55

## 2020-01-01 RX ADMIN — HYDRALAZINE HYDROCHLORIDE 50 MG: 50 TABLET, FILM COATED ORAL at 05:00

## 2020-01-01 RX ADMIN — GABAPENTIN 300 MG: 300 CAPSULE ORAL at 16:59

## 2020-01-01 RX ADMIN — LORAZEPAM 4 MG: 1 TABLET ORAL at 23:16

## 2020-01-01 RX ADMIN — DEXMEDETOMIDINE 0.3 MCG/KG/HR: 100 INJECTION, SOLUTION, CONCENTRATE INTRAVENOUS at 06:45

## 2020-01-01 RX ADMIN — SODIUM BICARBONATE 650 MG TABLET 1300 MG: at 09:04

## 2020-01-01 RX ADMIN — LORAZEPAM 2 MG: 2 INJECTION INTRAMUSCULAR; INTRAVENOUS at 19:48

## 2020-01-01 RX ADMIN — HYDRALAZINE HYDROCHLORIDE 50 MG: 50 TABLET, FILM COATED ORAL at 14:18

## 2020-01-01 RX ADMIN — METOPROLOL TARTRATE 25 MG: 25 TABLET, FILM COATED ORAL at 09:11

## 2020-01-01 RX ADMIN — HYDRALAZINE HYDROCHLORIDE 50 MG: 50 TABLET, FILM COATED ORAL at 15:16

## 2020-01-01 RX ADMIN — CARVEDILOL 25 MG: 25 TABLET, FILM COATED ORAL at 17:58

## 2020-01-01 RX ADMIN — FERROUS SULFATE TAB 325 MG (65 MG ELEMENTAL FE) 325 MG: 325 (65 FE) TAB at 08:27

## 2020-01-01 RX ADMIN — FIDAXOMICIN 200 MG: 200 TABLET, FILM COATED ORAL at 21:39

## 2020-01-01 RX ADMIN — INSULIN LISPRO 2 UNITS: 100 INJECTION, SOLUTION INTRAVENOUS; SUBCUTANEOUS at 13:26

## 2020-01-01 RX ADMIN — HYDRALAZINE HYDROCHLORIDE 50 MG: 50 TABLET ORAL at 21:15

## 2020-01-01 RX ADMIN — LORAZEPAM 1 MG: 1 TABLET ORAL at 09:28

## 2020-01-01 RX ADMIN — PANTOPRAZOLE SODIUM 40 MG: 40 INJECTION, POWDER, FOR SOLUTION INTRAVENOUS at 09:44

## 2020-01-01 RX ADMIN — PAROXETINE HYDROCHLORIDE 20 MG: 20 TABLET, FILM COATED ORAL at 08:12

## 2020-01-01 RX ADMIN — SODIUM CHLORIDE 1000 ML: 9 INJECTION, SOLUTION INTRAVENOUS at 21:34

## 2020-01-01 RX ADMIN — HEPARIN SODIUM 5000 UNITS: 5000 INJECTION, SOLUTION INTRAVENOUS; SUBCUTANEOUS at 05:23

## 2020-01-01 RX ADMIN — METOPROLOL TARTRATE 100 MG: 50 TABLET, FILM COATED ORAL at 21:16

## 2020-01-01 RX ADMIN — LORAZEPAM 2 MG: 2 INJECTION, SOLUTION INTRAMUSCULAR; INTRAVENOUS at 22:16

## 2020-01-01 RX ADMIN — PANTOPRAZOLE SODIUM 40 MG: 40 INJECTION, POWDER, FOR SOLUTION INTRAVENOUS at 08:15

## 2020-01-01 RX ADMIN — I.V. FAT EMULSION 250 ML: 20 EMULSION INTRAVENOUS at 17:33

## 2020-01-01 RX ADMIN — GABAPENTIN 600 MG: 300 CAPSULE ORAL at 20:26

## 2020-01-01 RX ADMIN — DEXTROSE MONOHYDRATE: 50 INJECTION, SOLUTION INTRAVENOUS at 20:55

## 2020-01-01 RX ADMIN — FERROUS SULFATE TAB 325 MG (65 MG ELEMENTAL FE) 325 MG: 325 (65 FE) TAB at 21:36

## 2020-01-01 RX ADMIN — AZITHROMYCIN MONOHYDRATE 500 MG: 500 INJECTION, POWDER, LYOPHILIZED, FOR SOLUTION INTRAVENOUS at 13:27

## 2020-01-01 RX ADMIN — LORAZEPAM 4 MG: 2 INJECTION INTRAMUSCULAR; INTRAVENOUS at 22:36

## 2020-01-01 RX ADMIN — CEFDINIR 300 MG: 300 CAPSULE ORAL at 08:27

## 2020-01-01 RX ADMIN — SODIUM CHLORIDE, PRESERVATIVE FREE 10 ML: 5 INJECTION INTRAVENOUS at 09:03

## 2020-01-01 RX ADMIN — SODIUM CHLORIDE, PRESERVATIVE FREE 10 ML: 5 INJECTION INTRAVENOUS at 20:19

## 2020-01-01 RX ADMIN — LORAZEPAM 1 MG: 1 TABLET ORAL at 05:14

## 2020-01-01 RX ADMIN — FERROUS SULFATE TAB 325 MG (65 MG ELEMENTAL FE) 325 MG: 325 (65 FE) TAB at 10:07

## 2020-01-01 RX ADMIN — Medication 100 MG: at 07:58

## 2020-01-01 RX ADMIN — MAGNESIUM SULFATE HEPTAHYDRATE 4 G: 80 INJECTION, SOLUTION INTRAVENOUS at 09:34

## 2020-01-01 RX ADMIN — CARVEDILOL 25 MG: 25 TABLET, FILM COATED ORAL at 16:30

## 2020-01-01 RX ADMIN — GABAPENTIN 600 MG: 300 CAPSULE ORAL at 21:36

## 2020-01-01 ASSESSMENT — PAIN DESCRIPTION - PAIN TYPE
TYPE: ACUTE PAIN
TYPE: ACUTE PAIN;CHRONIC PAIN
TYPE: ACUTE PAIN
TYPE: ACUTE PAIN;CHRONIC PAIN
TYPE: ACUTE PAIN
TYPE: CHRONIC PAIN
TYPE: ACUTE PAIN
TYPE: CHRONIC PAIN;NEUROPATHIC PAIN
TYPE: CHRONIC PAIN
TYPE: ACUTE PAIN
TYPE: ACUTE PAIN;CHRONIC PAIN
TYPE: ACUTE PAIN
TYPE: ACUTE PAIN;CHRONIC PAIN
TYPE: CHRONIC PAIN;ACUTE PAIN
TYPE: ACUTE PAIN
TYPE: ACUTE PAIN
TYPE: CHRONIC PAIN;ACUTE PAIN
TYPE: CHRONIC PAIN
TYPE: ACUTE PAIN
TYPE: CHRONIC PAIN
TYPE: ACUTE PAIN

## 2020-01-01 ASSESSMENT — PAIN SCALES - GENERAL
PAINLEVEL_OUTOF10: 0
PAINLEVEL_OUTOF10: 8
PAINLEVEL_OUTOF10: 6
PAINLEVEL_OUTOF10: 0
PAINLEVEL_OUTOF10: 6
PAINLEVEL_OUTOF10: 0
PAINLEVEL_OUTOF10: 8
PAINLEVEL_OUTOF10: 8
PAINLEVEL_OUTOF10: 2
PAINLEVEL_OUTOF10: 0
PAINLEVEL_OUTOF10: 8
PAINLEVEL_OUTOF10: 8
PAINLEVEL_OUTOF10: 5
PAINLEVEL_OUTOF10: 5
PAINLEVEL_OUTOF10: 4
PAINLEVEL_OUTOF10: 0
PAINLEVEL_OUTOF10: 8
PAINLEVEL_OUTOF10: 4
PAINLEVEL_OUTOF10: 0
PAINLEVEL_OUTOF10: 4
PAINLEVEL_OUTOF10: 5
PAINLEVEL_OUTOF10: 7
PAINLEVEL_OUTOF10: 0
PAINLEVEL_OUTOF10: 6
PAINLEVEL_OUTOF10: 3
PAINLEVEL_OUTOF10: 8
PAINLEVEL_OUTOF10: 8
PAINLEVEL_OUTOF10: 5
PAINLEVEL_OUTOF10: 0
PAINLEVEL_OUTOF10: 1
PAINLEVEL_OUTOF10: 0
PAINLEVEL_OUTOF10: 5
PAINLEVEL_OUTOF10: 10
PAINLEVEL_OUTOF10: 8
PAINLEVEL_OUTOF10: 0
PAINLEVEL_OUTOF10: 0
PAINLEVEL_OUTOF10: 6
PAINLEVEL_OUTOF10: 0
PAINLEVEL_OUTOF10: 4
PAINLEVEL_OUTOF10: 4
PAINLEVEL_OUTOF10: 8
PAINLEVEL_OUTOF10: 5
PAINLEVEL_OUTOF10: 8
PAINLEVEL_OUTOF10: 10
PAINLEVEL_OUTOF10: 0
PAINLEVEL_OUTOF10: 5
PAINLEVEL_OUTOF10: 0
PAINLEVEL_OUTOF10: 9
PAINLEVEL_OUTOF10: 0
PAINLEVEL_OUTOF10: 3
PAINLEVEL_OUTOF10: 0
PAINLEVEL_OUTOF10: 0
PAINLEVEL_OUTOF10: 8
PAINLEVEL_OUTOF10: 3
PAINLEVEL_OUTOF10: 7
PAINLEVEL_OUTOF10: 6
PAINLEVEL_OUTOF10: 8
PAINLEVEL_OUTOF10: 7
PAINLEVEL_OUTOF10: 4
PAINLEVEL_OUTOF10: 0
PAINLEVEL_OUTOF10: 8
PAINLEVEL_OUTOF10: 6
PAINLEVEL_OUTOF10: 0
PAINLEVEL_OUTOF10: 10
PAINLEVEL_OUTOF10: 0
PAINLEVEL_OUTOF10: 7
PAINLEVEL_OUTOF10: 8
PAINLEVEL_OUTOF10: 0
PAINLEVEL_OUTOF10: 6
PAINLEVEL_OUTOF10: 8
PAINLEVEL_OUTOF10: 10
PAINLEVEL_OUTOF10: 7
PAINLEVEL_OUTOF10: 8
PAINLEVEL_OUTOF10: 6
PAINLEVEL_OUTOF10: 10
PAINLEVEL_OUTOF10: 0
PAINLEVEL_OUTOF10: 6
PAINLEVEL_OUTOF10: 0
PAINLEVEL_OUTOF10: 4
PAINLEVEL_OUTOF10: 10
PAINLEVEL_OUTOF10: 8
PAINLEVEL_OUTOF10: 0
PAINLEVEL_OUTOF10: 0
PAINLEVEL_OUTOF10: 3
PAINLEVEL_OUTOF10: 8
PAINLEVEL_OUTOF10: 0
PAINLEVEL_OUTOF10: 8
PAINLEVEL_OUTOF10: 5
PAINLEVEL_OUTOF10: 4
PAINLEVEL_OUTOF10: 8
PAINLEVEL_OUTOF10: 8
PAINLEVEL_OUTOF10: 5
PAINLEVEL_OUTOF10: 6
PAINLEVEL_OUTOF10: 4
PAINLEVEL_OUTOF10: 0
PAINLEVEL_OUTOF10: 1
PAINLEVEL_OUTOF10: 10
PAINLEVEL_OUTOF10: 8
PAINLEVEL_OUTOF10: 8
PAINLEVEL_OUTOF10: 0
PAINLEVEL_OUTOF10: 10
PAINLEVEL_OUTOF10: 0
PAINLEVEL_OUTOF10: 7
PAINLEVEL_OUTOF10: 0
PAINLEVEL_OUTOF10: 8
PAINLEVEL_OUTOF10: 7
PAINLEVEL_OUTOF10: 8
PAINLEVEL_OUTOF10: 7
PAINLEVEL_OUTOF10: 4
PAINLEVEL_OUTOF10: 8
PAINLEVEL_OUTOF10: 0
PAINLEVEL_OUTOF10: 8
PAINLEVEL_OUTOF10: 8
PAINLEVEL_OUTOF10: 10
PAINLEVEL_OUTOF10: 7
PAINLEVEL_OUTOF10: 8
PAINLEVEL_OUTOF10: 0
PAINLEVEL_OUTOF10: 8
PAINLEVEL_OUTOF10: 8
PAINLEVEL_OUTOF10: 0
PAINLEVEL_OUTOF10: 6
PAINLEVEL_OUTOF10: 0
PAINLEVEL_OUTOF10: 6
PAINLEVEL_OUTOF10: 0
PAINLEVEL_OUTOF10: 8
PAINLEVEL_OUTOF10: 0
PAINLEVEL_OUTOF10: 6
PAINLEVEL_OUTOF10: 0
PAINLEVEL_OUTOF10: 8
PAINLEVEL_OUTOF10: 0
PAINLEVEL_OUTOF10: 10
PAINLEVEL_OUTOF10: 0
PAINLEVEL_OUTOF10: 10
PAINLEVEL_OUTOF10: 6
PAINLEVEL_OUTOF10: 7
PAINLEVEL_OUTOF10: 8
PAINLEVEL_OUTOF10: 0
PAINLEVEL_OUTOF10: 8
PAINLEVEL_OUTOF10: 9
PAINLEVEL_OUTOF10: 10
PAINLEVEL_OUTOF10: 0
PAINLEVEL_OUTOF10: 3
PAINLEVEL_OUTOF10: 0
PAINLEVEL_OUTOF10: 0
PAINLEVEL_OUTOF10: 8
PAINLEVEL_OUTOF10: 8
PAINLEVEL_OUTOF10: 0
PAINLEVEL_OUTOF10: 6
PAINLEVEL_OUTOF10: 8
PAINLEVEL_OUTOF10: 7
PAINLEVEL_OUTOF10: 0
PAINLEVEL_OUTOF10: 8
PAINLEVEL_OUTOF10: 0
PAINLEVEL_OUTOF10: 0
PAINLEVEL_OUTOF10: 10
PAINLEVEL_OUTOF10: 0
PAINLEVEL_OUTOF10: 6
PAINLEVEL_OUTOF10: 8
PAINLEVEL_OUTOF10: 8
PAINLEVEL_OUTOF10: 0
PAINLEVEL_OUTOF10: 0
PAINLEVEL_OUTOF10: 6
PAINLEVEL_OUTOF10: 0

## 2020-01-01 ASSESSMENT — PAIN DESCRIPTION - PROGRESSION
CLINICAL_PROGRESSION: NOT CHANGED
CLINICAL_PROGRESSION: GRADUALLY WORSENING
CLINICAL_PROGRESSION: NOT CHANGED
CLINICAL_PROGRESSION: GRADUALLY WORSENING
CLINICAL_PROGRESSION: NOT CHANGED
CLINICAL_PROGRESSION: GRADUALLY IMPROVING
CLINICAL_PROGRESSION: NOT CHANGED
CLINICAL_PROGRESSION: GRADUALLY IMPROVING
CLINICAL_PROGRESSION: NOT CHANGED
CLINICAL_PROGRESSION: GRADUALLY IMPROVING
CLINICAL_PROGRESSION: NOT CHANGED
CLINICAL_PROGRESSION: GRADUALLY WORSENING
CLINICAL_PROGRESSION: NOT CHANGED
CLINICAL_PROGRESSION: GRADUALLY WORSENING
CLINICAL_PROGRESSION: NOT CHANGED
CLINICAL_PROGRESSION: GRADUALLY WORSENING
CLINICAL_PROGRESSION: GRADUALLY IMPROVING
CLINICAL_PROGRESSION: NOT CHANGED
CLINICAL_PROGRESSION: GRADUALLY WORSENING
CLINICAL_PROGRESSION: GRADUALLY WORSENING
CLINICAL_PROGRESSION: NOT CHANGED
CLINICAL_PROGRESSION: GRADUALLY WORSENING
CLINICAL_PROGRESSION: NOT CHANGED
CLINICAL_PROGRESSION: GRADUALLY WORSENING
CLINICAL_PROGRESSION: NOT CHANGED

## 2020-01-01 ASSESSMENT — ENCOUNTER SYMPTOMS
ABDOMINAL PAIN: 0
EYE REDNESS: 0
SHORTNESS OF BREATH: 0
CONSTIPATION: 0
CONSTIPATION: 0
BACK PAIN: 0
SHORTNESS OF BREATH: 0
DIARRHEA: 0
SHORTNESS OF BREATH: 0
RECTAL PAIN: 0
ABDOMINAL PAIN: 0
BACK PAIN: 0
ABDOMINAL PAIN: 0
COUGH: 0
BACK PAIN: 0
SORE THROAT: 0
COUGH: 0
ANAL BLEEDING: 0
SHORTNESS OF BREATH: 0
DIARRHEA: 0
SORE THROAT: 0
PHOTOPHOBIA: 0
DIARRHEA: 0
CHOKING: 0
RHINORRHEA: 0
EYE ITCHING: 0
COLOR CHANGE: 0
STRIDOR: 0
COUGH: 0
CONSTIPATION: 0
EYE REDNESS: 0
APNEA: 0

## 2020-01-01 ASSESSMENT — PAIN SCALES - WONG BAKER

## 2020-01-01 ASSESSMENT — PAIN DESCRIPTION - FREQUENCY
FREQUENCY: CONTINUOUS
FREQUENCY: CONTINUOUS
FREQUENCY: INTERMITTENT
FREQUENCY: CONTINUOUS
FREQUENCY: INTERMITTENT
FREQUENCY: CONTINUOUS
FREQUENCY: INTERMITTENT
FREQUENCY: CONTINUOUS
FREQUENCY: INTERMITTENT
FREQUENCY: CONTINUOUS
FREQUENCY: INTERMITTENT
FREQUENCY: CONTINUOUS
FREQUENCY: INTERMITTENT
FREQUENCY: CONTINUOUS

## 2020-01-01 ASSESSMENT — PAIN DESCRIPTION - LOCATION
LOCATION: HEAD
LOCATION: HEAD
LOCATION: TOE (COMMENT WHICH ONE)
LOCATION: HAND;FOOT
LOCATION: CHEST
LOCATION: HEAD
LOCATION: FOOT
LOCATION: TOE (COMMENT WHICH ONE)
LOCATION: TOE (COMMENT WHICH ONE)
LOCATION: CHEST
LOCATION: GENERALIZED
LOCATION: TOE (COMMENT WHICH ONE)
LOCATION: FOOT
LOCATION: ARM;FOOT
LOCATION: CHEST;FOOT
LOCATION: FOOT
LOCATION: GENERALIZED
LOCATION: HEAD;FOOT
LOCATION: CHEST
LOCATION: CHEST;HEAD
LOCATION: TOE (COMMENT WHICH ONE)
LOCATION: ABDOMEN;GENERALIZED
LOCATION: CHEST
LOCATION: FOOT
LOCATION: FOOT
LOCATION: HEAD;CHEST;GENERALIZED
LOCATION: RIB CAGE
LOCATION: CHEST
LOCATION: FOOT
LOCATION: FOOT
LOCATION: TOE (COMMENT WHICH ONE)
LOCATION: CHEST;FOOT
LOCATION: GENERALIZED
LOCATION: RIB CAGE;KNEE
LOCATION: FOOT
LOCATION: GENERALIZED
LOCATION: FOOT
LOCATION: FOOT
LOCATION: CHEST
LOCATION: HEAD;ABDOMEN
LOCATION: OTHER (COMMENT)
LOCATION: CHEST;TOE (COMMENT WHICH ONE)
LOCATION: RIB CAGE
LOCATION: RIB CAGE
LOCATION: TOE (COMMENT WHICH ONE)
LOCATION: HEAD;ABDOMEN
LOCATION: HEAD;ABDOMEN

## 2020-01-01 ASSESSMENT — PAIN - FUNCTIONAL ASSESSMENT

## 2020-01-01 ASSESSMENT — PAIN DESCRIPTION - ORIENTATION
ORIENTATION: RIGHT;LEFT
ORIENTATION: LOWER
ORIENTATION: RIGHT;LEFT
ORIENTATION: LEFT;RIGHT
ORIENTATION: RIGHT
ORIENTATION: RIGHT
ORIENTATION: RIGHT;LEFT
ORIENTATION: MID
ORIENTATION: MID;RIGHT;LEFT
ORIENTATION: RIGHT;LEFT
ORIENTATION: LEFT
ORIENTATION: MID
ORIENTATION: MID
ORIENTATION: RIGHT;LEFT
ORIENTATION: LEFT
ORIENTATION: RIGHT;LEFT
ORIENTATION: RIGHT;LEFT
ORIENTATION: LEFT;RIGHT
ORIENTATION: MID
ORIENTATION: MID
ORIENTATION: RIGHT;LEFT

## 2020-01-01 ASSESSMENT — PAIN DESCRIPTION - ONSET
ONSET: ON-GOING
ONSET: GRADUAL
ONSET: ON-GOING
ONSET: GRADUAL
ONSET: ON-GOING
ONSET: GRADUAL
ONSET: ON-GOING
ONSET: GRADUAL
ONSET: GRADUAL
ONSET: ON-GOING
ONSET: GRADUAL
ONSET: ON-GOING
ONSET: PROGRESSIVE
ONSET: ON-GOING

## 2020-01-01 ASSESSMENT — PAIN DESCRIPTION - DESCRIPTORS
DESCRIPTORS: ACHING
DESCRIPTORS: CRAMPING
DESCRIPTORS: ACHING
DESCRIPTORS: BURNING;ACHING
DESCRIPTORS: ACHING
DESCRIPTORS: SHARP
DESCRIPTORS: ACHING
DESCRIPTORS: ACHING
DESCRIPTORS: ACHING;BURNING
DESCRIPTORS: ACHING
DESCRIPTORS: ACHING;SHARP
DESCRIPTORS: CRAMPING
DESCRIPTORS: ACHING;SHARP
DESCRIPTORS: ACHING
DESCRIPTORS: SHARP;SHOOTING
DESCRIPTORS: ACHING
DESCRIPTORS: ACHING
DESCRIPTORS: SHARP;ACHING
DESCRIPTORS: ACHING
DESCRIPTORS: ACHING
DESCRIPTORS: ACHING;HEADACHE
DESCRIPTORS: ACHING
DESCRIPTORS: OTHER (COMMENT)
DESCRIPTORS: ACHING;BURNING
DESCRIPTORS: SHARP;SHOOTING
DESCRIPTORS: ACHING
DESCRIPTORS: DISCOMFORT;ACHING
DESCRIPTORS: SHARP
DESCRIPTORS: ACHING
DESCRIPTORS: ACHING
DESCRIPTORS: THROBBING;TENDER
DESCRIPTORS: ACHING;DISCOMFORT
DESCRIPTORS: ACHING
DESCRIPTORS: ACHING;BURNING

## 2020-01-01 ASSESSMENT — PATIENT HEALTH QUESTIONNAIRE - PHQ9
2. FEELING DOWN, DEPRESSED OR HOPELESS: 0
1. LITTLE INTEREST OR PLEASURE IN DOING THINGS: 0
SUM OF ALL RESPONSES TO PHQ QUESTIONS 1-9: 0
SUM OF ALL RESPONSES TO PHQ QUESTIONS 1-9: 0
SUM OF ALL RESPONSES TO PHQ9 QUESTIONS 1 & 2: 0

## 2020-01-01 ASSESSMENT — COPD QUESTIONNAIRES
COPD: 1
COPD: 1

## 2020-01-01 ASSESSMENT — PAIN DESCRIPTION - DIRECTION: RADIATING_TOWARDS: DENIES

## 2020-01-02 ENCOUNTER — TELEPHONE (OUTPATIENT)
Dept: FAMILY MEDICINE CLINIC | Age: 50
End: 2020-01-02
Payer: COMMERCIAL

## 2020-01-02 LAB
CONTROL: NORMAL
HEMOCCULT STL QL: NEGATIVE

## 2020-01-02 PROCEDURE — 82274 ASSAY TEST FOR BLOOD FECAL: CPT | Performed by: FAMILY MEDICINE

## 2020-03-16 PROBLEM — N17.9 ACUTE RENAL FAILURE (ARF) (HCC): Status: ACTIVE | Noted: 2020-01-01

## 2020-03-16 NOTE — ED PROVIDER NOTES
Emergency Department Encounter    Patient: Rosales Bryan  MRN: [id number]  : 1970  Date of Evaluation: 3/16/2020  ED Provider:  Destin Walden    Triage Chief Complaint:   Shortness of Breath; Chest Pain (left sternal, constant 10/10 burning chest pain.); and Fatigue    [place]:  Rosales Bryan is a 44 y.o. male that presents with multiple reasons for presentation, per report initially EMS stated he called mostly because he was homeless and did not have any vertigo, here the emergency department is complaining of shortness of breath and fatigue, states has been coughing for quite some time, continually asking for something to drink. No chest pain. No abdominal pain. No vomiting or diarrhea. Denies any ingestions or drug use. Symptoms are moderate and constant.     ROS - see HPI, below listed is current ROS at time of my eval:  General:  No fevers, no chills, + weakness  Eyes:  No recent vison changes, no discharge  ENT:  No sore throat, no nasal congestion, no hearing changes  Cardiovascular:  No chest pain, no palpitations  Respiratory:  + shortness of breath, + cough, no wheezing  Gastrointestinal:  No pain, no nausea, no vomiting, no diarrhea  Musculoskeletal:  No muscle pain, no joint pain  Skin:  No rash, no pruritis, no easy bruising  Neurologic:  No speech problems, no headache  Genitourinary:  No dysuria, no hematuria  Endocrine:  No unexpected weight gain, no unexpected weight loss  Extremities:  no edema, no pain    Past Medical History:   Diagnosis Date    COPD [facility]chronic obstructive pulmonary disease) ([facility] [address] 2013    History of alcohol abuse     Quit early 2012    History of idiopathic seizure 2012    Hypertension     Panic attack      Past Surgical History:   Procedure Laterality Date    FOOT SURGERY      right foot tendon release as a baby    TONSILLECTOMY       Family History   Problem Relation Age of Onset    High Blood Pressure Mother     Other Mother         MS    COPD Mother  COPD Father     Alcohol Abuse Father          47    Tuberculosis Maternal Grandfather      Social History     Socioeconomic History    Marital status:      Spouse name: Not on file    Number of children: Not on file    Years of education: Not on file    Highest education level: Not on file   Occupational History    Not on file   Social Needs    Financial resource strain: Not on file    Food insecurity     Worry: Not on file     Inability: Not on file    Transportation needs     Medical: Not on file     Non-medical: Not on file   Tobacco Use    Smoking status: Former Smoker     Packs/day: 0.50     Types: Cigarettes     Start date: 2015    Smokeless tobacco: Never Used   Substance and Sexual Activity    Alcohol use: Yes     Comment: soc    Drug use: No    Sexual activity: Not on file   Lifestyle    Physical activity     Days per week: Not on file     Minutes per session: Not on file    Stress: Not on file   Relationships    Social connections     Talks on phone: Not on file     Gets together: Not on file     Attends Jewish service: Not on file     Active member of club or organization: Not on file     Attends meetings of clubs or organizations: Not on file     Relationship status: Not on file    Intimate partner violence     Fear of current or ex partner: Not on file     Emotionally abused: Not on file     Physically abused: Not on file     Forced sexual activity: Not on file   Other Topics Concern    Not on file   Social History Narrative    Not on file     Current Facility-Administered Medications   Medication Dose Route Frequency Provider Last Rate Last Dose    0.9 % sodium chloride bolus  1,000 mL Intravenous Once Kobe Case MD 1,000 mL/hr at 20 1,000 mL at 20    vancomycin (VANCOCIN) 1000 mg in dextrose 5% 200 mL IVPB  1,000 mg Intravenous Once Kobe Case  mL/hr at 20 1,000 mg at 20    0.9 % sodium chloride interpreted all of the currently available lab results from this visit (if applicable):  Results for orders placed or performed during the hospital encounter of 03/16/20   Comprehensive Metabolic Panel   Result Value Ref Range    Sodium 124 (L) 135 - 145 MMOL/L    Potassium 3.6 3.5 - 5.1 MMOL/L    Chloride 71 (L) 99 - 110 mMol/L    CO2 14 (L) 21 - 32 MMOL/L    BUN 53 (H) 6 - 23 MG/DL    CREATININE 2.8 (H) 0.9 - 1.3 MG/DL    Glucose 148 (H) 70 - 99 MG/DL    Calcium 8.6 8.3 - 10.6 MG/DL    Alb 4.5 3.4 - 5.0 GM/DL    Total Protein 6.9 6.4 - 8.2 GM/DL    Total Bilirubin 2.1 (H) 0.0 - 1.0 MG/DL     (H) 10 - 40 U/L     (H) 15 - 37 IU/L    Alkaline Phosphatase 80 40 - 128 IU/L    GFR Non- 24 (L) >60 mL/min/1.73m2    GFR  29 (L) >60 mL/min/1.73m2    Anion Gap 39 (H) 4 - 16   CBC Auto Differential   Result Value Ref Range    WBC 27.7 (H) 4.0 - 10.5 K/CU MM    RBC 4.79 4.6 - 6.2 M/CU MM    Hemoglobin 15.3 13.5 - 18.0 GM/DL    Hematocrit 43.5 42 - 52 %    MCV 90.8 78 - 100 FL    MCH 31.9 (H) 27 - 31 PG    MCHC 35.2 32.0 - 36.0 %    RDW 13.2 11.7 - 14.9 %    Platelets 542 414 - 700 K/CU MM    MPV 10.7 7.5 - 11.1 FL    Bands Relative 6 5 - 11 %    Segs Relative 87.0 (H) 36 - 66 %    Lymphocytes % 1.0 (L) 24 - 44 %    Monocytes % 6.0 (H) 0 - 4 %    Bands Absolute 1.66 K/CU MM    Segs Absolute 24.0 K/CU MM    Lymphocytes Absolute 0.3 K/CU MM    Monocytes Absolute 1.7 K/CU MM    Differential Type MANUAL DIFFERENTIAL     RBC Morphology RBC MORPHOLOGY NORMAL    Brain Natriuretic Peptide   Result Value Ref Range    Pro-BNP 2,311 (H) <300 PG/ML   Troponin   Result Value Ref Range    Troponin T <0.010 <0.01 NG/ML   Lactic Acid, Plasma   Result Value Ref Range    Lactate (HH) 0.4 - 2.0 mMOL/L     8.2  LACT CALLED TO DR Obdulia Zayas AT [address] [place] [address]  RESULTS READ BACK        Radiographs (if obtained):  Radiologist's Report Reviewed:  Xr Chest Standard (2 Vw)    Result Date: 3/16/2020  EXAMINATION: Hepatic steatosis. EKG (if obtained): (All EKG's are interpreted by myself in the absence of a cardiologist)  EKG shows a sinus tachycardia rate of 107 normal MD and QRS intervals no ST elevation no old EKG available for comparison    MDM:  Patient here with URI symptoms along with fatigue, work-up initiated, he was placed on the monitor, in the midst of his work-up I was called to the room because patient had went unresponsive and according to exam had lost his pulses, CPR it started, he received CPR for less than 30 seconds and we had a return of pulse, along with patient back to his normal level of consciousness he was slightly disoriented initially but within about 30 to 60 seconds he was back to his baseline mental status. No clear seizure activity, patient did have this confusion post episode state. Patient's labs which were drawn before this episode show a lactate of 8.2, sodium of 124 BUN of 53 creatinine of 2.8 elevated liver function testing, his white count was 27.7, anion gap was 39, BNP was 2300 CT head and chest did not reveal any acute abnormalities. I went ahead and sent blood cultures, he was given multiple boluses of IV fluids, I ordered broad-spectrum antibiotics although I do not have a source for infection he does meet Sirs criteria, not in septic shock, he is awake and talking at this time with stable vital signs, spoke with hospitalist we will admit for further work-up and treatment      Total critical care time provided today was 45 minutes. This excludes seperately billable procedures and family discussion time. Critical care time provided for obtaining history, conducting a physical exam, performing and monitoring interventions, ordering, collecting and interpreting tests, and establishing medical decision-making. There was a potential for life/limb threatening pathology requiring close evaluation and intervention with concern for patient decompensation.     Clinical

## 2020-03-16 NOTE — ED NOTES
This RN was with another patient when I heard another nurse yelling for help. This RN ran to room to find [employer] performing CPR. Arpan Bob this RN assisted with transporting patient to trauma bay where a pulse check was performed and patient was found to have a strong pulse. Patient alert, unable to follow commands or answer questions. Making a moaning sound. Dr Batool Mayer and Coco COTTO were immediately at bedside during this situation. Once confirmed pulse, patient was taken to CT where he currently remains.       Bj Surekha, RN  03/16/20 2997

## 2020-03-17 NOTE — PROGRESS NOTES
Illness:     Pt S&E. No chest pain, no dyspnea, improving abd pain, resolved N/V, admit can not feel his toes, bilateral arm and face redness    10-14 point ROS reviewed negative, unless as noted above    Objective: Intake/Output Summary (Last 24 hours) at 3/17/2020 0907  Last data filed at 3/17/2020 0645  Gross per 24 hour   Intake 663 ml   Output 550 ml   Net 113 ml      Vitals:   Vitals:    03/17/20 0832   BP: (!) 164/97   Pulse: 104   Resp: 25   Temp:    SpO2: 100%     Physical Exam:    GEN Awake male, cooperative, no apparent distress. RESP Clear to auscultation, no wheezes, rales or rhonchi. Symmetric chest movement . CARDIO/VASC S1/S2 auscultated. Regular rate. GI Abdomen is soft with epigastric tenderness, Bowel sounds are normoactive. MSK/SKIN Bilateral toes impaired sensation, cool to touch, pale. NEURO normal speech, no lateralizing weakness. PSYCH Awake, alert, oriented x 4. Affect appropriate.       Medications:   Medications:    vancomycin (VANCOCIN) intermittent dosing (placeholder)   Other See Admin Instructions    sodium chloride flush  10 mL Intravenous 2 times per day    thiamine (VITAMIN B1) IVPB  100 mg Intravenous Daily    cefepime  2 g Intravenous Q12H    insulin lispro  0-6 Units Subcutaneous TID WC    insulin lispro  0-3 Units Subcutaneous Nightly    heparin (porcine)  5,000 Units Subcutaneous 3 times per day    gabapentin  300 mg Oral BID    PARoxetine  20 mg Oral Daily    tiotropium  2 puff Inhalation Daily    pantoprazole  40 mg Intravenous Daily      Infusions:    sodium chloride 100 mL/hr at 03/17/20 0859    dextrose       PRN Meds: sodium chloride flush, 10 mL, PRN  polyethylene glycol, 17 g, Daily PRN  ondansetron, 4 mg, Q8H PRN    Or  ondansetron, 4 mg, Q6H PRN  glucose, 15 g, PRN  dextrose, 12.5 g, PRN  glucagon (rDNA), 1 mg, PRN  dextrose, 100 mL/hr, PRN  albuterol sulfate HFA, 2 puff, Q6H PRN  morphine, 2 mg, Q4H PRN          Electronically signed

## 2020-03-17 NOTE — PROGRESS NOTES
Patient arrived to room 2122 at this time and able to crawl from stretcher to bed with little assistance. Complaints of pain and request for pain medicine was the first thing the patient stated. Patient complaining of upper abdominal pain and possible pancreatitis noted on CT of chest. Patient also complaining of SOB with exertion. Patient skin assessed and redness noted to face, extremities, elbows, and back. Also, patient right four toes and L 3 toes white, dusky, and cold with capillary relfex greater than 3 seconds. Posterior tibial pulses found bilateral feet but not pedal pulses found in either foot. [facility] 10, NP and Dr. Anay Vega notified of findings and at bedside for evaluation of patient. Order for arterial US of lower extremities ordered and will hold off on cardiothoracic consult until resulted. Inquired about CT of abdomen and instructed to monitor for temperature and other signs of infection for now. Will monitor patient closely.

## 2020-03-17 NOTE — ED NOTES
Pt was placed in a gown, does not want to take is pants off in the ED, states that he would like to wait until he is upstairs.      Dorys Chin RN  03/16/20 0220

## 2020-03-17 NOTE — H&P
History and Physical      Name:  Emily Randle /Age/Sex: 1970  (44 y.o. male)   MRN & CSN:  [id number] & [id number] Admission Date/Time: 3/16/2020  5:52 PM   Location:  [facility] PCP: Venancio Campo MD       Discussed patient with Dr. Su Moyer and Plan:     Emily Randle is a 44 y.o.  male  who presents with fatigue    - Acute renal failure  Creat 2.8 (baseline 0.9)  Likely prerenal from dehydration  NS 0.9 MIVF & Serial BMP q 4 hrs  Consult to Nephro; holding ACEi    - ? Sepsis  WBC 28, lac 8.2, tachycardia  ? source of infection: CXR, CTA-- without acute process; no evidence of soft tissue infection  Check RESP PCR, blood cultures, Procal, UA with reflex, stat ABG  Empiric VANC (pharm to dose) and Cefepime IV q 12  Repeat lactic q 4 until normal   NS 0.9 2 L Bolus in ED; continue NS 0.9 100/hr    - Transient AMS/unresponsiveness  In ED while not on telemetry- unconfirmed pulselessness   No defibrillation/ACLS meds given; CPR done for 10 - 15 seconds  CT head without acute process; CTA chest- no PE  Immediately AAOX4 and on room air after episode  Admit to ICU; trend trops; UDS pending    - ? Pancreatitis  Noted on CTA chest  Hx of ETOH abuse (no ETOH since 3/1/20)  , , bili 2.1  Check lipase, pt/ptt, repeat LFTs  NPO, monitor, ABX as above    - Hyponatremia  124  ?  [place] v reported minimal PO intake for ~ 3 days  IVF and serial BMP w Rn to d/c IVF, notify Hospitalist if Na increase > 2 q 4 hrs    - ETOH hx  Reports no access to ETOH since 3/1  Thiamine IV q day  No s/sx of withdrawal    - Hyperglcyemia  No known hx of DM  Gluocse 147  POCT q 4 while in ICU  Start SSI and check A1c    - Homeless  Consult to CM for d/c planning needs      Chronic  - HTN- Takes ACEi- Holding due to ARF, soft bp  - COPD- Not in exacerbation; cont home spiriva, albuterol  - Neuropathy- Cont gabapentin  - Depression- Cont paxil    Discussed patient with ER physician     Diet NPO   DVT Prophylaxis [] Lovenox, [x]  Heparin, [] SCDs, [] Ambulation   GI Prophylaxis [x] PPI,  [] H2 Blocker,  [] Carafate,  [] Diet/Tube Feeds   Code Status Full   Disposition Patient requires continued admission due to renal failure   MDM [] Low, [] Moderate,[x]  High  Patient's risk as above due to      [] One or more chronic illnesses with exacerbation progression      [x] Two or more stable chronic illnesses      [x] Undiagnosed new problem with uncertain prognosis      [] Elective major surgery      []Prescription drug management       History of Present Illness:     Chief Complaint: fatigue, homelessness     Jazzmine Pineda is a 44 y.o.  male  who presents with the above complaints, onset today. Patient reported to ED that he has had fatigue and has been homeless since March 1. Prior to this time, he was living in and out of homeless shelters. At time of exam, he reports epigastric pain that began today. Reports 1 episode of non-bloody emesis this morning. Denies chest pain, sob, back pain, urinary sx. States he has not had food or water for 2-3 days and has been outside for 72 hours. Denies hx of coronary stents, renal failure, DM. In the ED, he was off telemetry when he had an episode of unresponsiveness and reported pulselessness. CPR was conducted for 15 seconds before he awoke. No defibrillation was conducted, no ACLS meds given. He has been alert and oriented and on room air. Reports PMH of HTN, depression, COPD not o2 dependent. Reports alcohol abuse hx but states he has not had access to ETOH since March 1. Confirms code status. Denies illicit drug use, foreign substance ingestion.         Ten point ROS reviewed negative, unless as noted above    Objective:     No intake or output data in the 24 hours ending 03/16/20 2132     Vitals:   Vitals:    03/16/20 1850   BP: 106/70   Pulse: 104   Resp: 23   Temp: 97.2   SpO2: 100       Physical Exam:     GEN Awake male, sitting upright in bed in no apparent skull or soft  tissues.     Impression:       No acute intracranial abnormality.     CTA Chest W WO Contrast [[id number]]      Order Status: Canceled          Electronically signed by KEREN Barraza NP on 3/16/2020 at 9:32 PM

## 2020-03-17 NOTE — CONSULTS
hydrOXYzine (ATARAX) 50 MG tablet Take 50 mg by mouth 3 times daily as needed for Itching    Historical Provider, MD   PARoxetine (PAXIL) 20 MG tablet Take 1 tablet by mouth daily 19   Mitch Ballrad MD        Allergies:  Patient has no known allergies. Social History:   TOBACCO:   reports that he has quit smoking. His smoking use included cigarettes. He started smoking about 4 years ago. He smoked 0.50 packs per day. He has never used smokeless tobacco.  ETOH:   reports current alcohol use.   OCCUPATION:      Family History:       Problem Relation Age of Onset    High Blood Pressure Mother     Other Mother         MS    COPD Mother     COPD Father     Alcohol Abuse Father          47    Tuberculosis Maternal Grandfather            Physical Exam:    Vitals: BP (!) 161/98   Pulse 102   Temp 98.1 °F (36.7 °C) (Oral)   Resp 26   Ht 6' 2\" (1.88 m)   Wt 179 lb 14.3 oz (81.6 kg)   SpO2 99%   BMI 23.10 kg/m²   General appearance: in no acute distress, appears stated age  Skin: Skin color, texture, turgor normal. No rashes or lesions  HEENT: normocephalic, atraumatic  Neck: supple, trachea midline  Lungs: clear to auscultation bilaterally, breathing comfortably  Heart[de-identified] regular rate and rhythm, S1, S2 normal,  Abdomen: soft, non-tender; bowel sounds normal; no masses,   Extremities: extremities normal, atraumatic, no cyanosis or edema  Neurologic: Mental status: alert, oriented, interactive, following commands  Psychiatric: mood and affect appropriate    CBC:   Recent Labs     20  1801 20  0420   WBC 27.7* 26.8*   HGB 15.3 13.6    219     BMP:    Recent Labs     20  1801 20  0000 20  0430   * 122* 122*   K 3.6 3.8 3.8   CL 71* 78* 80*   CO2 14* 15* 17*   BUN 53* 60* 63*   CREATININE 2.8* 2.7* 2.7*   GLUCOSE 148* 123* 167*     Hepatic:   Recent Labs     20  1801   *   *   BILITOT 2.1*   ALKPHOS 80     Troponin: No results for input(s): TROPONINI in the last 72 hours. BNP: No results for input(s): BNP in the last 72 hours. Lipids: No results for input(s): CHOL, HDL in the last 72 hours. Invalid input(s): LDLCALCU  ABGs: No results found for: PHART, PO2ART, ASL0FXY  INR:   Recent Labs     03/17/20  0420   INR 1.07       I/O last 3 completed shifts: In: 874 [I.V.:363; IV Piggyback:300]  Out: 550 [Urine:550]      No intake/output data recorded.      -----------------------------------------------------------------      Assessment and Recommendations   - Hyponatremia: 124 on admission//ddx: volume depletion vs medication side effect  - BETTY: prerenal vS ATN vs obstruction//cr 2.7 from baseline of 0.9//recived iv contrast on admission  - Anion gap metabolic acidosis  - Acute pancreatitis  - Hx of alcohol abuse: has supposedly quit, serum alcohol was positive  - HTN   - Copd   - Anxiety    Plan:  - Continue NS  - Monitor serum sodium per the order  - Hold lisinopril in setting of BETTY  - GIve po bicarb for now for the metabolic acidosis  - Obtain urine osmolality, serum osmolality, urine electrolytes  - Avoid nephrotoxins  - Renally dose meds  - Do a bmp tomorrow  - renal US  Thank you        Electronically signed by Reji Rodriguez DO on 3/17/2020 at 9:36 AM    MD Phillip Hickey DO [address] [place], [place] [address]  PHONE: [phone]  FAX: [phone]

## 2020-03-18 NOTE — CONSULTS
Department of GeneralSurgery   Surgical Service Dr Julito Garcia   Consult Note    Date of Consult: 3/17/20      Reason for Consult:  BLE toe ischemia  Requesting Physician:  Dr Mackey Lighter:  BLE pain and abd pain    History Obtained From:  patient    HISTORY OF PRESENT ILLNESS:                The patient is a 44 y.o. male who presents with generalized fatigue and abdominal pain x 3 days. Patient is homeless. Abdominal pain is described as cramping and dull. It is located in the epigastric. Pain level is 4/10. Patient reports of alcohol abuse. Immuno 1 denies any illicit drug use. He has some nausea and vomiting. Denies any fever or chills. While in the emergency room department he did have a brief episode of unresponsiveness at which point CPR was performed. Patient did regain consciousness with no ACLS meds given. Did have a CT of the abdomen pelvis. This showed acute pancreatitis with hepatic steatosis. Patient was admitted to the ICU for fluid resuscitation. He also had acute renal failure with a creatinine of 2.8. He was noted to have bilateral lower extremity blue toes without any wounds. This improved with fluid resuscitation. He had a ultrasound of lower extremities. This showed    Impression   1. Nonvisualization of the proximal and distal anterior tibial and peroneal   arteries bilaterally into the feet.  The mid anterior tibial and peroneal   arteries are visualized and patent bilaterally.  The posterior tibial   arterial systems are patent in bilateral lower extremities.    2. Otherwise no evidence of a flow-limiting stenosis in bilateral lower   extremity arterial systems.             Past Medical History:    Past Medical History:   Diagnosis Date    COPD (chronic obstructive pulmonary disease) ([facility] [address] 4/2/2013    History of alcohol abuse     Quit early 9/2012    History of idiopathic seizure 11/2012    Hypertension     Panic attack        Past Surgical History:    Past together: None     Attends Judaism service: None     Active member of club or organization: None     Attends meetings of clubs or organizations: None     Relationship status: None    Intimate partner violence     Fear of current or ex partner: None     Emotionally abused: None     Physically abused: None     Forced sexual activity: None   Other Topics Concern    None   Social History Narrative    None       Family History:   Family History   Problem Relation Age of Onset    High Blood Pressure Mother     Other Mother         MS    COPD Mother     COPD Father     Alcohol Abuse Father          47    Tuberculosis Maternal Grandfather        REVIEW OFSYSTEMS:    Review of Systems   Constitutional: Positive for fatigue. Negative for chills and fever. HENT: Negative for ear pain, mouth sores, sore throat and tinnitus. Eyes: Negative for photophobia, redness and itching. Respiratory: Negative for apnea, choking and stridor. Cardiovascular: Negative for chest pain and palpitations. Gastrointestinal: Negative for anal bleeding, constipation and rectal pain. Endocrine: Negative for polydipsia. Genitourinary: Negative for enuresis, flank pain and hematuria. Musculoskeletal: Positive for gait problem. Negative for back pain, joint swelling and myalgias. Skin: Positive for pallor. Negative for color change. Allergic/Immunologic: Negative for environmental allergies. Neurological: Positive for weakness and numbness. Negative for syncope and speech difficulty. Psychiatric/Behavioral: Negative for confusion and hallucinations. PHYSICAL EXAM:  Vitals:    20 1732 20 1802 20 1902 20   BP: (!) 179/102 (!) 157/95 (!) 156/93    Pulse: 100 109 109 121   Resp:  24   Temp:    97.8 °F (36.6 °C)   TempSrc:    Oral   SpO2: 100% 100% 99% 99%   Weight:       Height:           Physical Exam  Constitutional:       Appearance: He is well-developed.    HENT:

## 2020-03-18 NOTE — PROGRESS NOTES
MD Bebe Titus DO [address]  [place], [place] [address]  PHONE: [phone]  FAX: [phone]

## 2020-03-19 NOTE — PROGRESS NOTES
[address] [facility]  consulted by Dr. Virgilio Cruz for monitoring and adjustment. Indication for Treatment: SIRS 2/2 acute pancreatitis  Other Antimicrobials:  Cefepime   Goal Trough: 15 - 20 mcg/ml    Estimated Creatinine Clearance: 37 mL/min (A) (based on SCr of 2.7 mg/dL (H)). Recent Labs     03/16/20  1801 03/16/20  1948 03/16/20  2320  03/17/20  0420 03/17/20  0430  03/18/20  1735 03/18/20  2338 03/19/20  0530   WBC 27.7*  --   --   --  26.8*  --   --   --   --  24.0*   BUN 53*  --   --    < >  --  63*   < > 72* 70* 69*   CREATININE 2.8*  --   --    < >  --  2.7*   < > 2.7* 2.7* 2.7*   LACTATE  --  8.2  LACT CALL[facility] DR Lizzie Moore AT 2030, [facility]  RESULTS READ BACK  * 1.8  --   --  1.7  --   --   --   --     < > = values in this interval not displayed. WBC   Date Value Ref Range Status   03/19/2020 24.0 (H) 4.0 - 10.5 K/CU MM Final     Lactate   Date Value Ref Range Status   03/17/2020 1.7 0.4 - 2.0 mMOL/L Final   03/16/2020 1.8 0.4 - 2.0 mMOL/L Final   03/16/2020 (HH) 0.4 - 2.0 mMOL/L Final    8.2  LACT CALLED TO DR Lizzie Moore AT 2030, [facility]  RESULTS READ BACK       Temp Readings from Last 3 Encounters:   03/19/20 98.8 °F (37.1 °C) (Oral)   02/25/19 98 °F (36.7 °C) (Oral)   05/09/18 97.9 °F (36.6 °C) (Oral)       Intake/Output Summary (Last 24 hours) at 3/19/2020 1331  Last data filed at 3/19/2020 0601  Gross per 24 hour   Intake --   Output 1500 ml   Net -1500 ml     Pertinent Cultures:  Date    Source    Results  3/16   Blood    NGTD x 48h  3/16   Resp PCR   Negative    Vancomycin level:   TROUGH:  No results for input(s): VANCOTROUGH in the last 72 hours. RANDOM:    Recent Labs     03/17/20  1739 03/19/20  0530   VANCORANDOM 18.7  (NOTE)  Therapeutic Range Interpretation: Please refer to current dosing   guidelines. 16.9  (NOTE)  Therapeutic Range Interpretation: Please refer to current dosing   guidelines.          Assessment:  · WBC and temperature: WBC elevated @ 24; Afebrile. · SCr, BUN, and urine output: Elevated  · Day(s) of therapy: #4  · Vancomycin level: 16.9, appropriate to re-dose    Plan:  · Vancomycin 1250 mg IVPB x 1 dose ordered based on level result. · Continue intermittent vancomycin dosing 2/2 renal dysfunction. · Plan for next random level: 3/20 @ 1300. · Pharmacy will continue to monitor patient and adjust therapy as indicated.     Thank you for the consult,    Tayler Borges, PharmD, [facility]

## 2020-03-19 NOTE — CARE COORDINATION
Attempted to meet with patient to reinforce discharge plan. He is asleep and did not arouse to voice/touch. Minus Lena AMEZQUITA    DISCHARGE PLAN IS THAT PATIENT REPORT TO [facility].

## 2020-03-19 NOTE — PROGRESS NOTES
03/16/20  1801 03/17/20  0420 03/19/20  0530   WBC 27.7* 26.8* 24.0*   HGB 15.3 13.6 10.0*    219 165       BMP:    Recent Labs     03/18/20  1735 03/18/20  2338 03/19/20  0530   * 127* 123*   K 3.3* 3.6 3.2*   CL 86* 91* 87*   CO2 23 20* 25   BUN 72* 70* 69*   CREATININE 2.7* 2.7* 2.7*   GLUCOSE 196* 146* 189*     Hepatic:   Recent Labs     03/16/20  1801   *   *   BILITOT 2.1*   ALKPHOS 80     Troponin: No results for input(s): TROPONINI in the last 72 hours. BNP: No results for input(s): BNP in the last 72 hours. Lipids: No results for input(s): CHOL, HDL in the last 72 hours.     Invalid input(s): LDLCALCU  ABGs: No results found for: PHART, PO2ART, SEU6TDU  INR:   Recent Labs     03/17/20  0420   INR 1.07       Objective:   Vitals: BP (!) 118/94   Pulse 110   Temp 98.4 °F (36.9 °C) (Oral)   Resp 22   Ht 6' 2\" (1.88 m)   Wt 179 lb 14.3 oz (81.6 kg)   SpO2 94%   BMI 23.10 kg/m²   General appearance: alert and cooperative with exam, in no acute distress  HEENT: normocephalic, atraumatic,   Neck: supple, trachea midline  Lungs:  breathing comfortably on room air  Heart[de-identified] tachycardic  Abdomen: ; non distended,   Extremities:no ble edema    Assessment and Plan:     - Hyponatremia: 124 on admission//ddx: volume depletion vs alcohol use  vs medication side effect vs siadh//Glenroy 27, UCl 15, Uosm 636, Sosm 299  - BETTY: from ATN //cr 2.7 from baseline of 0.9//received iv contrast on admission so some NGA is also involved//renal US showed no HN  - Anion gap metabolic acidosis  - Acute pancreatitis: lipase of 3000  - transaminitis  - Hx of alcohol abuse: has supposedly quit, serum alcohol was positive  - HTN   - Copd   - Anxiety  - hypokalemia     Plan:  -  Cr stable  - sodium 127 last night after tolvaptan yesterday  and is now 123 this morning?, continue supportive mgmt  - Hold lisinopril in setting of BETTY  - GIve po bicarb for now for the metabolic acidosis  - Avoid nephrotoxins  -

## 2020-03-20 NOTE — PROGRESS NOTES
Physical Therapy  Attempted PT evaluation this PM.  RN held at this time. Will re-attempt as able and appropriate.       Casey Wayne, PT, DPT  License #: [id number]

## 2020-03-20 NOTE — PROGRESS NOTES
- Resting comfortably  - Good uop yesterday  - Serum sodium 140  - Continue supportive mgmt,   - lipid profile shows no hypertriglyceridemia

## 2020-03-20 NOTE — PROGRESS NOTES
random level. · A rancomycin random level ordered this afternoon as add-on with AM labs. · Level was supra-therapeutic >20. Hold additional vancomycin today. · Continue intermittent vancomycin dosing 2/2 renal dysfunction. · Plan for next random level: 3/21 @ 0600. · Pharmacy will continue to monitor patient and adjust therapy as indicated.     Thank you for the consult,    Nicola Wilks, PharmD, [facility]

## 2020-03-21 NOTE — PLAN OF CARE
Problem: Pain:  Description: Pain management should include both nonpharmacologic and pharmacologic interventions. Goal: Pain level will decrease  Description: Pain level will decrease  3/21/2020 0537 by Silvia Santoyo RN  Outcome: Ongoing  3/20/2020 1618 by Rubin Shankar. Antonia Hall RN  Outcome: Ongoing  Goal: Control of acute pain  Description: Control of acute pain  3/21/2020 0537 by Silvia Santoyo RN  Outcome: Ongoing  3/20/2020 1618 by Rubin Shankar. Antonia Hall RN  Outcome: Ongoing  Goal: Control of chronic pain  Description: Control of chronic pain  3/21/2020 0537 by Silvia Santoyo RN  Outcome: Ongoing  3/20/2020 1618 by Rubin Shankar. Antonia Hall RN  Outcome: Ongoing     Problem: Falls - Risk of:  Goal: Will remain free from falls  Description: Will remain free from falls  3/21/2020 0537 by Silvia Santoyo RN  Outcome: Ongoing  3/20/2020 1618 by Rubin Shankar. Antonia Hall RN  Outcome: Ongoing  Goal: Absence of physical injury  Description: Absence of physical injury  3/21/2020 0537 by Silvia Santoyo RN  Outcome: Ongoing  3/20/2020 1618 by Rubin Shankar. Antonia Hall RN  Outcome: Ongoing     Problem: Fluid Volume:  Goal: Ability to achieve a balanced intake and output will improve  Description: Ability to achieve a balanced intake and output will improve  3/21/2020 0537 by Silvia Santoyo RN  Outcome: Ongoing  3/20/2020 1618 by Rubin Shankar. Antonia Hall RN  Outcome: Ongoing     Problem: Physical Regulation:  Goal: Ability to maintain clinical measurements within normal limits will improve  Description: Ability to maintain clinical measurements within normal limits will improve  3/21/2020 0537 by Silvia Santoyo RN  Outcome: Ongoing  3/20/2020 1618 by Rubin Shankar. Antonia Hall RN  Outcome: Ongoing  Goal: Will show no signs and symptoms of electrolyte imbalance  Description: Will show no signs and symptoms of electrolyte imbalance  3/21/2020 0537 by Silvia Santoyo RN  Outcome: Ongoing  3/20/2020 1618 by Rubin Shankar.  Antonia Hall RN  Outcome: Ongoing     Problem:

## 2020-03-21 NOTE — PROGRESS NOTES
Hospitalist Progress Note      Name:  Miesha Mehta /Age/Sex: 1970  (44 y.o. male)   MRN & CSN:  [id number] & [id number] Admission Date/Time: 3/16/2020  5:52 PM   Location:  -A PCP: Nina Fitzgerald MD         Hospital Day: 6    Assessment and Plan:   Miesha Mehta is a 44 y.o.  male  who presents with <principal problem not specified>     > SIRS sec to acute pancreatitis  > Acute Pancreatitis  -WBC 28, lac 8.2, tachycardia  - CXR, CTA-- without acute process; no evidence of soft tissue infection, RESP PCR neg, blood cultures pending,UA with no pyuria, Empiric VANC (pharm to dose) and Cefepime IV q 12  - Pancreatitis on CTA chest, IVF, NPO,   - h/o alcohol, but reports last drink , RUQ US with sludge no cholelithiasis  - Lactate improved to WNL,   - continue with NPO due to ileus and AMS, lipase improved.      > Alcohol dependence with withdrawal , DT  MVI, thiamin, folic acid  No s/sx of withdrawal  CIWA with ativan prn.   3/21 Started on precedex IV    > Abd distention sec to ileus   - xray suggestive of ileus  - NPO, IVF, monitor,   - check Abd xray, consider NG tube if worse or no improvement.     > Acute renal failure  > Hyponatremia 124 on admission  -Creat 2.8 (baseline 0.9)  -Likely prerenal from dehydration  -NS 0.9 MIVF & Serial BMP q 4 hrs  -Consulted Nephro; holding ACEi  - improving    >Early signs of frostbite  >Peripheral vascular disease  - bilateral toes discoloration and impaired sensation  - concern about frost bite injury, arterial doppler with no major abnormality  - consulted surgery, vascular surgery  - conservative management.      > Transient AMS/unresponsiveness  In ED while not on telemetry- unconfirmed pulselessness   No defibrillation/ACLS meds given; CPR done for 10 - 15 seconds  CT head without acute process; CTA chest- no PE  Immediately AAOX4 and on room air after episode  normal trops;   - 3/21 having DT's    > Essential HTN  - Takes ACEi Holding due to dextrose       PRN Meds: nicotine polacrilex, 4 mg, PRN  potassium chloride, 10 mEq, PRN  magnesium sulfate, 1 g, PRN  sodium chloride flush, 10 mL, PRN  LORazepam, 1 mg, Q1H PRN    Or  LORazepam, 1 mg, Q1H PRN    Or  LORazepam, 2 mg, Q1H PRN    Or  LORazepam, 2 mg, Q1H PRN    Or  LORazepam, 3 mg, Q1H PRN    Or  LORazepam, 3 mg, Q1H PRN    Or  LORazepam, 4 mg, Q1H PRN    Or  LORazepam, 4 mg, Q1H PRN  hydrALAZINE (APRESOLINE) ivpb, 10 mg, Q4H PRN  sodium chloride flush, 10 mL, PRN  polyethylene glycol, 17 g, Daily PRN  ondansetron, 4 mg, Q8H PRN    Or  ondansetron, 4 mg, Q6H PRN  glucose, 15 g, PRN  dextrose, 12.5 g, PRN  glucagon (rDNA), 1 mg, PRN  dextrose, 100 mL/hr, PRN  albuterol sulfate HFA, 2 puff, Q6H PRN  morphine, 2 mg, Q4H PRN          Electronically signed by Lito Fisher MD on 3/21/2020 at 10:06 AM

## 2020-03-21 NOTE — PROGRESS NOTES
Resting   Good uop  Continue supportive mgmt with ivf  No labs today      A&P:  - Hyponatremia: 124 on admission//ddx: volume depletion vs alcohol use  vs medication side effect vs siadh//Glenroy 27, UCl 15, Uosm 636, Sosm 299//better after samsca 15mg po once on 3/18/20  - BETTY: from ATN //cr 2.7 from baseline of 0.9//received iv contrast on admission so some NGA is also involved//renal US showed no HN  - Anion gap metabolic acidosis  - Acute pancreatitis: lipase of 3000  - transaminitis  - Hx of alcohol abuse: had supposedly quit, serum alcohol was positive  - HTN   - Copd   - Anxiety  - hypokalemia

## 2020-03-21 NOTE — CONSULTS
Department of General Surgery   Surgical Service Dr. Asad Griffith   Consult Note    Date of Consult: 3/21/20    Reason for Consult:  Ileus vs pSBO  Requesting Physician:  Dr. Ac Champion:  Abdominal distension    History Obtained From:  patient, electronic medical record    HISTORY OF PRESENT ILLNESS:    The patient is a 44 y.o. male who presented on 3/16 with URI symptoms, fatigue and found to have acute renal failure, sepsis, frostbite and pancreatitis with lipase >3000 and associated SIRS. He as unresponsive for a period in the ED and CPR performed for less than 30 seconds. Since admission he has become distended. He has not had nausea or vomiting. He does have abdominal pain. He is poorly oriented this evening as I spoke with him.       Past Medical History:    Past Medical History:   Diagnosis Date    COPD (chronic obstructive pulmonary disease) ([facility] [address] 2013    History of alcohol abuse     Quit early 2012    History of idiopathic seizure 2012    Hypertension     Panic attack        Past Surgical History:    Past Surgical History:   Procedure Laterality Date    FOOT SURGERY      right foot tendon release as a baby    TONSILLECTOMY         Current Medications:   Current Facility-Administered Medications   Medication Dose Route Frequency Provider Last Rate Last Dose    dexmedetomidine (PRECEDEX) 400 mcg in sodium chloride 0.9 % 100 mL infusion  0.2 mcg/kg/hr Intravenous Continuous Phoebe Torres MD 4.2 mL/hr at 20 1208 0.2 mcg/kg/hr at 20 1208    vancomycin (VANCOCIN) 1,250 mg in dextrose 5 % 250 mL IVPB  1,250 mg Intravenous Once Phoebe Torres  mL/hr at 20 1531 1,250 mg at 20 1531    albuterol (PROVENTIL) nebulizer solution 2.5 mg  2.5 mg Nebulization Q6H PRN Tushar Langley MD        nicotine polacrilex (NICORETTE) gum 4 mg  4 mg Oral PRN Hafsa Asif PA-C        nicotine (NICODERM CQ) 21 MG/24HR 1 patch  1 patch Transdermal Daily Hafsa Asif 03/21/20 0853    hydrALAZINE (APRESOLINE) 10 mg in sodium chloride 0.9 % 50 mL ivpb  10 mg Intravenous Q4H PRN Linette Mott MD   Stopped at 03/17/20 1826    sodium chloride flush 0.9 % injection 10 mL  10 mL Intravenous 2 times per day Kd Guardado APRN - NP   10 mL at 03/21/20 0903    sodium chloride flush 0.9 % injection 10 mL  10 mL Intravenous PRN Kd Guardado APRN - NP        polyethylene glycol (GLYCOLAX) packet 17 g  17 g Oral Daily PRN Kd Guardado APRN - NP        cefepime (MAXIPIME) 2 g in dextrose 5 % 50 mL IVPB  2 g Intravenous Q12H Kd Guardado APRN - NP   Stopped at 03/21/20 0923    ondansetron (ZOFRAN-ODT) disintegrating tablet 4 mg  4 mg Oral Q8H PRN KEERN Crowder - JENNIFER        Or    ondansetron (ZOFRAN) injection 4 mg  4 mg Intravenous Q6H PRN KEREN Crowder - NP   4 mg at 03/19/20 0316    glucose (GLUTOSE) 40 % oral gel 15 g  15 g Oral PRN Kd Guardado APRN - NP        dextrose 50 % IV solution  12.5 g Intravenous PRN Kd Guardado APRN - NP        glucagon (rDNA) injection 1 mg  1 mg Intramuscular PRN Kd Guardado APRN - NP        dextrose 5 % solution  100 mL/hr Intravenous PRN Kd Guardado APRN - NP        heparin (porcine) injection 5,000 Units  5,000 Units Subcutaneous 3 times per day Kd Guardado APRN - NP   Stopped at 03/21/20 1625    gabapentin (NEURONTIN) capsule 300 mg  300 mg Oral BID Kd Guardado APRN - NP   300 mg at 03/21/20 0853    PARoxetine (PAXIL) tablet 20 mg  20 mg Oral Daily Kd Guardado, APRN - NP   20 mg at 03/21/20 0853    tiotropium (SPIRIVA RESPIMAT) 2.5 MCG/ACT inhaler 2 puff  2 puff Inhalation Daily KEREN Crowder - NP   2 puff at 03/20/20 0741    morphine (PF) injection 2 mg  2 mg Intravenous Q4H PRN KEREN Roach - CNP   2 mg at 03/18/20 0548    pantoprazole (PROTONIX) injection 40 mg  40 mg Intravenous Daily KEREN Roach - CNP   40 mg at

## 2020-03-22 NOTE — PROGRESS NOTES
Nephrology Progress Note        [address], [address] [facility], [place] [address]  Phone: [phone]  Office Hours: 8:30AM - 4:30PM  Monday - Friday       [facility]  MD Akin Floyd, DO [address],  [address]  [facility], [place] [address]  PHONE: [phone]  FAX: [phone]    3/22/2020 8:15 AM  Subjective:   Admit Date: 3/16/2020  PCP: Ej Del Angel MD  Interval History: ng tube in place  Knows where he is, asking for ice chips  Good uop      Diet: Diet NPO Effective Now Exceptions are: Ice Chips, Sips with Meds      Data:   Scheduled Meds:   nicotine  1 patch Transdermal Daily    sodium chloride flush  10 mL Intravenous 2 times per day    amLODIPine  5 mg Oral Daily    vancomycin (VANCOCIN) intermittent dosing (placeholder)   Other See Admin Instructions    folic acid  1 mg Oral Daily    multivitamin  1 tablet Oral Daily    thiamine  100 mg Oral Daily    sodium chloride flush  10 mL Intravenous 2 times per day    cefepime  2 g Intravenous Q12H    heparin (porcine)  5,000 Units Subcutaneous 3 times per day    gabapentin  300 mg Oral BID    PARoxetine  20 mg Oral Daily    tiotropium  2 puff Inhalation Daily    pantoprazole  40 mg Intravenous Daily     Continuous Infusions:   dextrose 125 mL/hr at 03/22/20 0758    dexmedetomidine (PRECEDEX) IV infusion 0.3 mcg/kg/hr (03/22/20 0645)    dextrose      dextrose       PRN Meds:albuterol, benzocaine, glucose, dextrose, glucagon (rDNA), dextrose, nicotine polacrilex, potassium chloride, magnesium sulfate, sodium chloride flush, LORazepam **OR** LORazepam **OR** LORazepam **OR** LORazepam **OR** LORazepam **OR** LORazepam **OR** LORazepam **OR** LORazepam, hydrALAZINE (APRESOLINE) ivpb, sodium chloride flush, polyethylene glycol, ondansetron **OR** ondansetron, glucose, dextrose, glucagon (rDNA), dextrose, morphine  I/O last 3 completed shifts: In: 3680.4 [I.V.:3330.4;  IV

## 2020-03-22 NOTE — CONSULTS
Consult to IV team for TPN infusion. Education regarding insertion of PICC reviewed with patient. Risk/benefit/and alternatives discussed. verbalized understanding. Consent given by patient. Time out done at 18:00. PICC line inserted right upper arm  without difficulty per protocol. Pt tolerated well. Good blood return and flushes easily. ECG tip confirmation system utilized for tip placement with P wave changes noted by RN during insertion and ECG strips left on chart. Educational booklet left at bedside.   Marie Perdomo yes

## 2020-03-22 NOTE — PROGRESS NOTES
and adjust therapy as indicated    Thank you for the consult,    Umair Velazquez, PharmD, [facility]

## 2020-03-22 NOTE — PROGRESS NOTES
Hospitalist Progress Note      Name:  Ronita Hodgkins /Age/Sex: 1970  (44 y.o. male)   MRN & CSN:  [id number] & [id number] Admission Date/Time: 3/16/2020  5:52 PM   Location:  -A PCP: Naif Díaz MD         Hospital Day: 7    Assessment and Plan:   Ronita Hodgkins is a 44 y.o.  male  who presents with <principal problem not specified>     > SIRS sec to acute pancreatitis  > Acute Pancreatitis  -WBC 28, lac 8.2, tachycardia  - CXR, CTA-- without acute process; no evidence of soft tissue infection, RESP PCR neg, blood cultures pending,UA with no pyuria, Empiric VANC (pharm to dose) and Cefepime IV q 12  - Pancreatitis on CTA chest, IVF, NPO,   - h/o alcohol, but reports last drink , RUQ US with sludge no cholelithiasis  - Lactate improved to WNL,   - Ileus/ SBO as below, surgery consulted  - start TPN    > Ileus , partial bowel obstruction.   - NPO, NG tube, IVF, Surgery consulted.  Hold norvasc  - start TPN    > Alcohol dependence with withdrawal , DT  MVI, thiamin, folic acid  No s/sx of withdrawal  CIWA with ativan prn.   3/21 Started on precedex IV    > Acute renal failure  > Hyponatremia 124 on admission resolved  > hypernatremia 151 3/21  > Hyperkalemia 6.1 3/21  -Creat 2.8 (baseline 0.9)  -Likely prerenal from dehydration  -NS 0.9 MIVF & Serial BMP q 4 hrs  -Consulted Nephro; holding ACEi  - IVF changed to D5, given lasix     >Early signs of frostbite  >Peripheral vascular disease  - bilateral toes discoloration and impaired sensation  - concern about frost bite injury, arterial doppler with no major abnormality  - consulted surgery, vascular surgery  - conservative management.      > Transient AMS/unresponsiveness  In ED while not on telemetry- unconfirmed pulselessness   No defibrillation/ACLS meds given; CPR done for 10 - 15 seconds  CT head without acute process; CTA chest- no PE  Immediately AAOX4 and on room air after episode  normal trops;   - 3/21 having DT's, started on precedex    > Essential HTN  - Takes ACEi Holding due to ARF  - IV hydralazine prn, added norvasc  - 3/22 dc norvasc due to ileus/SBO, start hydralazine     > Hyperglcyemia  No known hx of DM, A1c 5.0  Resolved.      > Homeless  Consult to CM for d/c planning needs      Chronic  - COPD- Not in exacerbation; cont home spiriva, albuterol  - Neuropathy- Cont gabapentin  - Depression- Cont paxil    Diet Diet NPO Effective Now Exceptions are: Ice Chips, Sips with Meds   DVT Prophylaxis ? Heparin   GI Prophylaxis ? PPI   Code Status Full Code   Disposition  pending clinical improvement      History of Present Illness:     Pt S&E. Pt confused, agitated,     10-14 point ROS limited due to pt's confusion    Objective: Intake/Output Summary (Last 24 hours) at 3/22/2020 0835  Last data filed at 3/22/2020 0800  Gross per 24 hour   Intake 3680.35 ml   Output 3840 ml   Net -159.65 ml      Vitals:   Vitals:    03/22/20 0800   BP: (!) 149/105   Pulse: 93   Resp: (!) 37   Temp: 97.9 °F (36.6 °C)   SpO2: 99%     Physical Exam:    GEN somnolent male, confused, no apparent distress. RESP Decreased air sounds. Symmetric chest movement . CARDIO/VASC S1/S2 auscultated. Regular rate. GI Abdomen is soft ,  distention,  Generalized discomfort with palp  MSK/SKIN Bilateral toes impaired sensation, discoloration   NEURO no lateralizing weakness. PSYCH Somnolent, confused.     Medications:   Medications:    nicotine  1 patch Transdermal Daily    sodium chloride flush  10 mL Intravenous 2 times per day    amLODIPine  5 mg Oral Daily    vancomycin (VANCOCIN) intermittent dosing (placeholder)   Other See Admin Instructions    folic acid  1 mg Oral Daily    multivitamin  1 tablet Oral Daily    thiamine  100 mg Oral Daily    sodium chloride flush  10 mL Intravenous 2 times per day    cefepime  2 g Intravenous Q12H    heparin (porcine)  5,000 Units Subcutaneous 3 times per day    gabapentin  300 mg Oral BID    PARoxetine 20 mg Oral Daily    tiotropium  2 puff Inhalation Daily    pantoprazole  40 mg Intravenous Daily      Infusions:    dextrose 125 mL/hr at 03/22/20 0758    dexmedetomidine (PRECEDEX) IV infusion 0.3 mcg/kg/hr (03/22/20 0645)    dextrose      dextrose       PRN Meds: albuterol, 2.5 mg, Q6H PRN  benzocaine, , Q1H PRN  glucose, 15 g, PRN  dextrose, 12.5 g, PRN  glucagon (rDNA), 1 mg, PRN  dextrose, 100 mL/hr, PRN  nicotine polacrilex, 4 mg, PRN  potassium chloride, 10 mEq, PRN  magnesium sulfate, 1 g, PRN  sodium chloride flush, 10 mL, PRN  LORazepam, 1 mg, Q1H PRN    Or  LORazepam, 1 mg, Q1H PRN    Or  LORazepam, 2 mg, Q1H PRN    Or  LORazepam, 2 mg, Q1H PRN    Or  LORazepam, 3 mg, Q1H PRN    Or  LORazepam, 3 mg, Q1H PRN    Or  LORazepam, 4 mg, Q1H PRN    Or  LORazepam, 4 mg, Q1H PRN  hydrALAZINE (APRESOLINE) ivpb, 10 mg, Q4H PRN  sodium chloride flush, 10 mL, PRN  polyethylene glycol, 17 g, Daily PRN  ondansetron, 4 mg, Q8H PRN    Or  ondansetron, 4 mg, Q6H PRN  glucose, 15 g, PRN  dextrose, 12.5 g, PRN  glucagon (rDNA), 1 mg, PRN  dextrose, 100 mL/hr, PRN  morphine, 2 mg, Q4H PRN          Electronically signed by Kelley Marcial MD on 3/22/2020 at 8:35 AM

## 2020-03-23 NOTE — PROGRESS NOTES
adjust therapy as indicated    Thank you for the consult,    Rodrigo Chan, PharmD, [facility]

## 2020-03-23 NOTE — PROGRESS NOTES
completed shifts: In: 2989.3 [I.V.:2889.3; IV Piggyback:100]  Out: 2117 [Urine:5080; Emesis/NG output:1050; Stool:55]  No intake/output data recorded. Intake/Output Summary (Last 24 hours) at 3/23/2020 0708  Last data filed at 3/23/2020 0600  Gross per 24 hour   Intake 2989.33 ml   Output 6185 ml   Net -3195.67 ml       CBC:   Recent Labs     03/21/20  0610 03/23/20  0525   WBC 17.5* 18.5*   HGB 10.6* 10.1*    427       BMP:    Recent Labs     03/21/20  2221 03/22/20  0600  03/22/20  1845 03/22/20  2320 03/23/20  0525   * 157*   < > 155* 156* 150*   K 6.1  K CALLED TO KADEN RN ICU [id number]   RESULTS READ BACK  * 5.9  K CALLED TO GRACY LAZAR RN ICU [id number] 3[address]   RESULTS READ BACK  *   < > 5.0 4.5 4.6   * 125*   < > 120* 121* 115*   CO2 22 20*   < > 25 25 25   BUN 46* 46*  --   --   --   --    CREATININE 2.1* 2.1*  --   --   --   --    GLUCOSE 124* 112*  --   --   --   --     < > = values in this interval not displayed. Hepatic: No results for input(s): AST, ALT, ALB, BILITOT, ALKPHOS in the last 72 hours. Troponin: No results for input(s): TROPONINI in the last 72 hours. BNP: No results for input(s): BNP in the last 72 hours. Lipids: No results for input(s): CHOL, HDL in the last 72 hours. Invalid input(s): LDLCALCU  ABGs: No results found for: PHART, PO2ART, LME4XOC  INR: No results for input(s): INR in the last 72 hours.     Objective:   Vitals: /78   Pulse 117   Temp 97.4 °F (36.3 °C) (Oral)   Resp (!) 35   Ht 6' 2\" (1.88 m)   Wt 179 lb 14.3 oz (81.6 kg)   SpO2 96%   BMI 23.10 kg/m²   General appearance: alert and cooperative with exam, in no acute distress  HEENT: normocephalic, atraumatic,   Neck: supple, trachea midline  Lungs: , breathing comfortably  Heart::tachycardia  Abdomen: soft,non distended,  Extremities: no ble  edema  Neurologic: alert, oriented, follows commands, interactive    Assessment and Plan:     - Hyponatremia: 124 on

## 2020-03-23 NOTE — PROGRESS NOTES
Daily    pantoprazole, 40 mg, Intravenous, Daily    Physical Exam:  General: Answers questions appropriately, in no acute distress  HEENT:  NG in place with clear output  Respiratory: normal effort, no wheezes appreciated  CV: appears well perfused  Abdomen: Soft, mildly tender to palpation, moderately distended. Skin: warm and dry  Extremities: atraumatic  Psych: restless    Assessment and Plan:  44 y.o. male with multiple medical problems. Presented with acute pancreatitis. I suspect he has an ileus secondary to intra-abdominal inflammation due to his pancreatitis.     Patient Active Problem List:     Panic attack     Anxiety     Abnormal LFTs     Steatohepatitis     Chronic obstructive pulmonary disease (HCC)     Cramps of lower extremity     Alcoholic hepatitis without ascites     Essential hypertension     Simple chronic bronchitis (HCC)     BETTY (acute kidney injury) ([address]     Alcohol abuse     Lumbosacral radiculopathy at S1     Acute renal failure (ARF) ([address]    - Keep NG to low intermittent wall suction  - small amount of ice chips ok  - will hold off on removing NG or starting diet until distention improves a little    aRdha Kraft MD   3/23/2020  11:20 AM

## 2020-03-23 NOTE — CONSULTS
[facility] Continence Nurse  Consult Note       Messi Saldaña  AGE: 44 y.o. GENDER: male  : 1970  TODAY'S DATE:  3/23/2020    Subjective:     Reason for  Evaluation and Assessment: wound assessment      Candace Santacruz is a 44 y.o. male referred by:   [x] Physician  [] Nursing  [] Other:     Wound Identification:  Wound Type: frostbite  Contributing Factors: decreased tissue oxygenation, malnutrition and substance abuse        PAST MEDICAL HISTORY        Diagnosis Date    COPD [facility]chronic obstructive [facility] disease) ([facility] 2013    History of alcohol abuse     Quit early 2012    History of idiopathic seizure 2012    Hypertension     Panic attack        PAST SURGICAL HISTORY    Past Surgical History:   Procedure Laterality Date    FOOT SURGERY      right foot tendon release as a baby    TONSILLECTOMY         FAMILY HISTORY    Family History   Problem Relation Age of Onset    High Blood Pressure Mother     Other Mother         MS    COPD Mother     COPD Father     Alcohol Abuse Father          47    Tuberculosis Maternal Grandfather        SOCIAL HISTORY    Social History     Tobacco Use    Smoking status: Former Smoker     Packs/day: 0.50     Types: Cigarettes     Start date: 2015    Smokeless tobacco: Never Used   Substance Use Topics    Alcohol use: Yes     Comment: states stopped drinking in December here and there    Drug use: No       ALLERGIES    No Known Allergies    MEDICATIONS    No current facility-administered medications on file prior to encounter.       Current Outpatient Medications on File Prior to Encounter   Medication Sig Dispense Refill    tiotropium (SPIRIVA HANDIHALER) 18 MCG inhalation capsule Inhale 1 capsule into the lungs daily 30 capsule 5    lisinopril (ZESTRIL) 30 MG tablet Take 1 tablet by mouth daily 30 tablet 5    albuterol sulfate HFA (PROVENTIL HFA) 108 (90 Base) MCG/ACT inhaler Inhale 2 puffs into the lungs every 6 hours as needed Tunneling Position ___ O'Clock 0 3/23/2020  1:30 PM   Undermining Starts ___ O'Clock 0 3/23/2020  1:30 PM   Undermining Ends___ O'Clock 0 3/23/2020  1:30 PM   Undermining Maxium Distance (cm) 0 3/23/2020  1:30 PM   Wound Assessment Black;Dry 3/23/2020  1:30 PM   Drainage Amount None 3/23/2020  1:30 PM   Odor None 3/23/2020  1:30 PM   Margins Attached edges 3/23/2020  1:30 PM   Laly-wound Assessment Red;Purple 3/23/2020  1:30 PM   Non-staged Wound Description Not applicable 9/28/3255  2:58 PM   Black%Wound Bed 100 3/23/2020  1:30 PM   Number of days: 0       Response to treatment:  Well tolerated by patient. Pain Assessment:  Severity:  none  Quality of pain: na  Wound Pain Timing/Severity: na  Premedicated: no    Plan:     Plan of Care: Wound Toe (Comment  which one) Anterior;Right;Posterior;Medial;Upper all toes and upper top and bottom of foot has areas of being dusky and purple with scattered black areas-Dressing/Treatment: Open to air  Wound Toe (Comment  which one) Anterior;Left;Posterior; Upper all toes and upper top and bottom of foot has areas of being dusky and purple with scattered black area-Dressing/Treatment: Open to air  Wound Knee Anterior;Right scuffed up purple areas with scabs to bilateral knees-Dressing/Treatment: Open to air  Wound Knee Anterior; Left scuffed up purple areas with scabs to bilateral knees-Dressing/Treatment: Open to air  Wound 03/23/20 Toe (Comment  which one) Anterior;Right 2nd toe-Dressing/Treatment: Betadine swabs     Pt in bed. Agreeable to wound assessment. Frostbite injury PTA. Dr. Marija Garcia evaluated and made recommendations. Bilateral toes with red/purple/black discoloration. Right 2nd anterior toe with black eschar. Pictures and measurements taken. Painted with betadine and left EMIR. No pressure or blankets on toes. Bilateral heels intact. Pt is generally not at risk for skin breakdown AEB [place].      Specialty Bed Required : no  [] Low Air Loss   [] Pressure

## 2020-03-24 NOTE — ADT AUTH CERT
Utilization Reviews         Sepsis and Other Febrile Illness, without Focal Infection - Care Day 7 (3/22/2020) by Tasha Dubois RN         Review Status Review Entered   Completed 3/24/2020 09:48       Criteria Review      Care Day: 7 Care Date: 3/22/2020 Level of Care:    Guideline Day 4    Level Of Care    (X) Floor to discharge [J]    3/24/2020 9:48 AM EDT by Jeff Cuevas      ICU    Clinical Status    ( ) * Hemodynamic stability    3/24/2020 9:48 AM EDT by Jeff Cuevas      HR   bp: 126/93    (X) * Fever absent or acceptable for next level of care    3/24/2020 9:48 AM EDT by Jeff Cuevas      36.7    (X) * Hypoxemia absent    3/24/2020 9:48 AM EDT by Jeff Cuevas      96-99% ra    ( ) * Tachypnea absent    (X) * Cultures negative or infection identified and under adequate treatment    ( ) * Mental status at baseline    (X) * Metabolic derangement (eg, dehydration, acidosis) absent    (X) * End-organ dysfunction (eg, myocardial ischemia, renal failure) absent    3/24/2020 9:48 AM EDT by Jeff Cuevas      bun: 46, cr: 2.1    ( ) * Discharge plans and education understood    Activity    ( ) * Ambulatory    Routes    ( ) * Oral hydration, medications [K]    Interventions    (X) WBC    Medications    (X) * Antimicrobial treatment not necessary or treatment at next level of care arranged [E]    3/24/2020 9:48 AM EDT by Jeff Cuevas      cefepime 2g iv bid    * Milestone   Additional Notes   3/22  Day 7      Pt remains in ICU      Vitals: t: 36.7 p: 109 rr: 32 bp: 119/93 spo2: 95% ra      Abn labs:  na: 157, k+: 5.9, co2: 20, bun: 46, cr: 2.1         Orders:   Cefepime 2g iv bid   Accu checks ac/hs with ssi coverage   Precedex gtt continuous   Heparin sq tid   Ativan per ciwa protocol   Vanco 1g iv bid   Seizure precautions   Mg, phos, cbc, cmp daily   NPO      Per Nephrology PN:  Assessment and Plan:       - Hyponatremia: 124 on admission//ddx: volume depletion vs alcohol bilateral toes discoloration and impaired sensation   - concern about frost bite injury, arterial doppler with no major abnormality   - consulted surgery, vascular surgery   - conservative management.        > Transient AMS/unresponsiveness   In ED while not on telemetry- unconfirmed pulselessness    No defibrillation/ACLS meds given; CPR done for 10 - 15 seconds   CT head without acute process; CTA chest- no PE   Immediately AAOX4 and on room air after episode   normal trops;    - 3/21 having DT's, started on precedex       > Essential HTN   - Takes ACEi Holding due to ARF   - IV hydralazine prn, added norvasc   - 3/22 dc norvasc due to ileus/SBO, start hydralazine       > Hyperglcyemia   No known hx of DM, A1c 5.0   Resolved.        > Homeless   Consult to [facility] for d/c planning needs       Per General surgery PN:  Assessment and Plan:   44 y. o. male with multiple medical problems.  Presented with acute pancreatitis.  I suspect he has an ileus secondary to intra-abdominal inflammation due to his pancreatitis.       Patient Active Problem List:      Panic attack      Anxiety      Abnormal LFTs      Steatohepatitis      Chronic obstructive pulmonary disease (HCC)      Cramps of lower extremity      Alcoholic hepatitis without ascites      Essential hypertension      Simple chronic bronchitis (HCC)      BETTY (acute kidney injury) ([address]      Alcohol abuse      Lumbosacral radiculopathy at S1      Acute renal failure (ARF) ([address]       - Keep NG to low intermittent wall suction   - small amount of ice chips ok       Sepsis and Other Febrile Illness, without Focal Infection - Care Day 6 (3/21/2020) by Epi Nguyen RN         Review Status Review Entered   Completed 3/24/2020 09:48       Criteria Review      Care Day: 6 Care Date: 3/21/2020 Level of Care:    Guideline Day 4    Level Of Care    (X) Floor to discharge [J]    3/24/2020 9:48 AM EDT by Song Martin      ICU    Clinical Status    ( ) * Hemodynamic A1c 5.0   Resolved.        > Homeless   Consult to CM for d/c planning needs        Chronic   - COPD- Not in exacerbation; cont home spiriva, albuterol   - Neuropathy- Cont gabapentin   - Depression- Cont paxil

## 2020-03-24 NOTE — PROGRESS NOTES
to monitor patient and adjust therapy as indicated    Thank you for the consult,    Arabella Ruff, PharmD, [facility]

## 2020-03-24 NOTE — PROGRESS NOTES
**OR** LORazepam **OR** LORazepam **OR** LORazepam **OR** LORazepam **OR** LORazepam **OR** LORazepam **OR** LORazepam, hydrALAZINE (APRESOLINE) ivpb, sodium chloride flush, polyethylene glycol, ondansetron **OR** ondansetron, glucose, dextrose, glucagon (rDNA), dextrose, morphine  I/O last 3 completed shifts: In: 9882 [I.V.:1407; IV Piggyback:300]  Out: 1500 [Urine:1500]  No intake/output data recorded. Intake/Output Summary (Last 24 hours) at 3/24/2020 0847  Last data filed at 3/24/2020 0400  Gross per 24 hour   Intake 2331 ml   Output 1500 ml   Net 831 ml       CBC:   Recent Labs     03/23/20  0525   WBC 18.5*   HGB 10.1*          BMP:    Recent Labs     03/22/20  0600  03/23/20  0525  03/23/20  1831 03/23/20  2210 03/24/20  0450   *   < > 147*  150*   < > 143 141 145  144   K 5.9  K CALLED TO GRACY LAZAR RN ICU [id number]   RESULTS READ BACK  *   < > 4.4  4.6   < > 4.1 3.8 3.8  3.8   *   < > 112*  115*   < > 108 105 109  109   CO2 20*   < > 24  25   < > 25 23 22  21   BUN 46*  --  50*  --   --   --  48*   CREATININE 2.1*  --  2.2*  --   --   --  2.5*   GLUCOSE 112*  --  154*  --   --   --  147*    < > = values in this interval not displayed. Hepatic:   Recent Labs     03/23/20  0525   AST 43*   ALT 50*   BILITOT 0.6   ALKPHOS 93     Troponin: No results for input(s): TROPONINI in the last 72 hours. BNP: No results for input(s): BNP in the last 72 hours. Lipids: No results for input(s): CHOL, HDL in the last 72 hours. Invalid input(s): LDLCALCU  ABGs: No results found for: PHART, PO2ART, FFO7QLW  INR: No results for input(s): INR in the last 72 hours.     Objective:   Vitals: BP (!) 139/95   Pulse 143   Temp 98.1 °F (36.7 °C) (Oral)   Resp 23   Ht 6' 2\" (1.88 m)   Wt 179 lb 14.3 oz (81.6 kg)   SpO2 94%   BMI 23.10 kg/m²   General appearance: alert and cooperative with exam, in no acute distress  HEENT: normocephalic, atraumatic,   Neck: supple, trachea

## 2020-03-24 NOTE — PROGRESS NOTES
[facility]   RESULTS READ BACK  *   < > 4.4  4.6   < > 4.1 3.8 3.8  3.8   *   < > 112*  115*   < > 108 105 109  109   CO2 20*   < > 24  25   < > 25 23 22  21   PHOS  --   --  3.1  --   --   --  3.4   BUN 46*  --  50*  --   --   --  48*   CREATININE 2.1*  --  2.2*  --   --   --  2.5*    < > = values in this interval not displayed.          Electronically signed by Rosi Hernandez MD on 3/24/2020 at 8:09 AM

## 2020-03-24 NOTE — PROGRESS NOTES
GENERAL SURGERY PROGRESS NOTE    Dale Holland is a 44 y.o. male with suspected ileus secondary to severe acute pancreatitis. Subjective:  Doing well today. More interactive. Having bowel function, reports passing a lot of gas and stool per fecal tube. NG with moderate output. He is taking ice chips/sips of water.     Objective:    Vitals: VITALS:  BP (!) 150/87   Pulse 124   Temp 98.4 °F (36.9 °C) (Oral)   Resp 22   Ht 6' 2\" (1.88 m)   Wt 179 lb 14.3 oz (81.6 kg)   SpO2 95%   BMI 23.10 kg/m²     I/O: 03/23 0701 - 03/24 0700  In: 5749 [I.V.:1407]  Out: 1500 [Urine:1500]    Labs/Imaging Results:     Lab Results   Component Value Date    WBC 18.5 (H) 03/23/2020    HGB 10.1 (L) 03/23/2020    HCT 30.7 (L) 03/23/2020    MCV 93.3 03/23/2020     03/23/2020     Lab Results   Component Value Date     03/24/2020     03/24/2020    K 3.8 03/24/2020    K 3.8 03/24/2020     03/24/2020     03/24/2020    CO2 21 03/24/2020    CO2 22 03/24/2020    BUN 48 03/24/2020    CREATININE 2.5 03/24/2020    GLUCOSE 147 03/24/2020    CALCIUM 7.7 03/24/2020        IV Fluids: PN-Adult Premix 5/15 - Standard Electrolytes - Central Line    PN-Adult Premix 5/15 - Standard Electrolytes - Central Line Last Rate: 42 mL/hr at 03/23/20 1830    dextrose Last Rate: 100 mL/hr at 03/24/20 0729    dexmedetomidine (PRECEDEX) IV infusion Last Rate: Stopped (03/23/20 0915)    dextrose    dextrose    Scheduled Meds: vancomycin, 1,250 mg, Intravenous, Once    metoprolol tartrate, 50 mg, Oral, BID    fat emulsion, 250 mL, Intravenous, Once per day on Mon Tue Thu Fri    hydrALAZINE, 50 mg, Oral, 3 times per day    sodium chloride flush, 10 mL, Intravenous, 2 times per day    nicotine, 1 patch, Transdermal, Daily    sodium chloride flush, 10 mL, Intravenous, 2 times per day    vancomycin (VANCOCIN) intermittent dosing (placeholder), , Other, See Admin Instructions    folic acid, 1 mg, Oral, Daily  

## 2020-03-25 NOTE — PROGRESS NOTES
Nephrology Progress Note        [address] [facility], [facility] [address]  Phone: [phone]  Office Hours: 8:30AM - 4:30PM  Monday - Friday       [facility]  Artist MD Jin Stearns DO [address],  Jaun Consuelo  [place] [facility]  PHONE: [phone]  FAX: [phone]    3/25/2020 8:52 AM  Subjective:   Admit Date: 3/16/2020  PCP: Gasper Vazquez MD  Interval History: on tpn  Remains tachycardic  nonoliguric    Diet: Diet NPO Effective Now Exceptions are: Ice Chips, Sips with Meds  PN-Adult Premix 5/15 - Standardard Electrolytes - Central Line      Data:   Scheduled Meds:   insulin lispro  0-12 Units Subcutaneous Q4H    metoprolol tartrate  50 mg Oral BID    fat emulsion  250 mL Intravenous Once per day on Mon Tue Thu Fri    hydrALAZINE  50 mg Oral 3 times per day    sodium chloride flush  10 mL Intravenous 2 times per day    nicotine  1 patch Transdermal Daily    sodium chloride flush  10 mL Intravenous 2 times per day    vancomycin (VANCOCIN) intermittent dosing (placeholder)   Other See Admin Instructions    folic acid  1 mg Oral Daily    thiamine  100 mg Oral Daily    sodium chloride flush  10 mL Intravenous 2 times per day    cefepime  2 g Intravenous Q12H    heparin (porcine)  5,000 Units Subcutaneous 3 times per day    gabapentin  300 mg Oral BID    PARoxetine  20 mg Oral Daily    tiotropium  2 puff Inhalation Daily    pantoprazole  40 mg Intravenous Daily     Continuous Infusions:   dextrose      lactated ringers      PN-Adult Premix 5/15 - Standard Electrolytes - Central Line 84 mL/hr at 03/24/20 1733    dexmedetomidine (PRECEDEX) IV infusion Stopped (03/23/20 0915)    dextrose      dextrose       PRN Meds:glucose, dextrose, glucagon (rDNA), dextrose, acetaminophen, oxyCODONE, sodium chloride flush, albuterol, benzocaine, glucose, dextrose, glucagon (rDNA), dextrose, nicotine polacrilex, potassium chloride, magnesium sulfate, sodium chloride flush, LORazepam **OR** LORazepam **OR** LORazepam **OR** LORazepam **OR** LORazepam **OR** LORazepam **OR** LORazepam **OR** LORazepam, hydrALAZINE (APRESOLINE) ivpb, sodium chloride flush, polyethylene glycol, ondansetron **OR** ondansetron, glucose, dextrose, glucagon (rDNA), dextrose, morphine  I/O last 3 completed shifts: In: 1912 [I.V.:1330; IV Piggyback:50]  Out: 4605 [YZDTT:8883; Emesis/NG output:350; Stool:75]  I/O this shift:  In: -   Out: 350 [Emesis/NG output:350]    Intake/Output Summary (Last 24 hours) at 3/25/2020 0852  Last data filed at 3/25/2020 0740  Gross per 24 hour   Intake 1912 ml   Output 2320 ml   Net -408 ml       CBC:   Recent Labs     03/23/20  0525 03/25/20  0444   WBC 18.5* 20.9*   HGB 10.1* 8.6*    317       BMP:    Recent Labs     03/23/20  0525  03/24/20  0450 03/24/20  1851 03/25/20  0444   *  150*   < > 145  144 138 128*   K 4.4  4.6   < > 3.8  3.8 3.7 4.6   *  115*   < > 109  109 102 96*   CO2 24  25   < > 22  21 21 19*   BUN 50*  --  48*  --  58*   CREATININE 2.2*  --  2.5*  --  3.4*   GLUCOSE 154*  --  147*  --  664*    < > = values in this interval not displayed. Hepatic:   Recent Labs     03/23/20  0525   AST 43*   ALT 50*   BILITOT 0.6   ALKPHOS 93     Troponin: No results for input(s): TROPONINI in the last 72 hours. BNP: No results for input(s): BNP in the last 72 hours. Lipids: No results for input(s): CHOL, HDL in the last 72 hours. Invalid input(s): LDLCALCU  ABGs: No results found for: PHART, PO2ART, ANB8GMQ  INR: No results for input(s): INR in the last 72 hours.     Objective:   Vitals: BP (!) 157/88   Pulse 120   Temp 98 °F (36.7 °C) (Oral)   Resp 27   Ht 6' 2\" (1.88 m)   Wt 179 lb 14.3 oz (81.6 kg)   SpO2 95%   BMI 23.10 kg/m²   General appearance: alert and cooperative with exam, in no acute distress  HEENT: normocephalic, atraumatic,   Neck: supple, trachea midline  Lungs: , breathing

## 2020-03-25 NOTE — PROGRESS NOTES
Hospitalist Progress Note      Name:  Dale Holland /Age/Sex: 1970  (44 y.o. male)   MRN & CSN:  [id number] & [id number] Admission Date/Time: 3/16/2020  5:52 PM   Location:  -A PCP: Rubi Frazier MD       Dale Holland is a 44 y.o.  male  who presents with Shortness of Breath; Chest Pain (left sternal, constant 10/10 burning chest pain.); and Fatigue      Assessment and Plan:   SIRS sec to acute pancreatitis  - NPO  - d/c vanc + cefepime, do not see any infectious source and agree Vanc can worsen renal fx  - blood cx NTD  - UA no signs of infection  - RESP PCR neg  - check ct abd/pelvis today, to r/o any infectious source that can be causing leucocytosis, could be reactive  - h/o alcohol, RUQ US with sludge no cholelithiasis  - Ileus/ SBO as below, surgery consulted,  started TPN     Ileus , partial bowel obstruction.   - NPO, NG tube, IVF, Surgery consulted.    - started TPN  - improving distention, okay for ice chips only for now     Alcohol dependence with withdrawal , DT  -stable  - oral, thiamine, folic acid  - No s/sx of withdrawal anymore  - CIWA with ativan prn.   - 3/21 Started on precedex IV, off now     Acute renal failure,  (baseline 0.9)  - worsening  - IVF  - Consulted Nephro; holding ACEi  - d/c neurontin  - d/c Vanc    Pseudohyponatremia  - correct glucose, d/c D5     Early signs of frostbite  Peripheral vascular disease  - bilateral toes discoloration and impaired sensation  - concern about frost bite injury, arterial doppler with no major abnormality  - consulted surgery, vascular surgery  - conservative management. Neurontin, Oxycodone prn pain     Transient AMS/unresponsiveness  - resolved  - In ED while not on telemetry- unconfirmed pulselessness   - No defibrillation/ACLS meds given; CPR done for 10 - 15 seconds  - CT head without acute process; CTA chest- no PE  - Immediately AAOX4 and on room air after episode  - 3/21 having DT's, started on precedex  - 3/23 improved PRN  potassium chloride, 10 mEq, PRN  magnesium sulfate, 1 g, PRN  sodium chloride flush, 10 mL, PRN  LORazepam, 1 mg, Q1H PRN    Or  LORazepam, 1 mg, Q1H PRN    Or  LORazepam, 2 mg, Q1H PRN    Or  LORazepam, 2 mg, Q1H PRN    Or  LORazepam, 3 mg, Q1H PRN    Or  LORazepam, 3 mg, Q1H PRN    Or  LORazepam, 4 mg, Q1H PRN    Or  LORazepam, 4 mg, Q1H PRN  sodium chloride flush, 10 mL, PRN  polyethylene glycol, 17 g, Daily PRN  ondansetron, 4 mg, Q8H PRN    Or  ondansetron, 4 mg, Q6H PRN  glucose, 15 g, PRN  dextrose, 12.5 g, PRN  glucagon (rDNA), 1 mg, PRN  dextrose, 100 mL/hr, PRN  morphine, 2 mg, Q4H PRN      Subjective:   Wants to drink, no pain    Objective: Intake/Output Summary (Last 24 hours) at 3/25/2020 0916  Last data filed at 3/25/2020 0740  Gross per 24 hour   Intake 1912 ml   Output 2320 ml   Net -408 ml      Vitals:   Vitals:    03/25/20 0817   BP:    Pulse:    Resp: 27   Temp:    SpO2: 95%     Physical Exam:   Gen:  awake, alert, cooperative, no apparent distress  Head/Eyes:  Normocephalic atraumatic, EOMI   NECK:   symmetrical, trachea midline  LUNGS: Normal Effort   CARDIOVASCULAR:  Normal rate  ABDOMEN: Non tender, + mild distended, no HSM noted. MUSCULOSKELETAL:  ROM WNL  NEUROLOGIC: Alert and Oriented,  Cranial nerves II-XII are grossly intact. SKIN:  no bruising or bleeding, normal skin color,  no redness      Data:       CBC   Recent Labs     03/23/20  0525 03/25/20  0444   WBC 18.5* 20.9*   HGB 10.1* 8.6*   HCT 30.7* 25.3*    317      BMP   Recent Labs     03/23/20  0525  03/24/20  0450 03/24/20  1851 03/25/20  0444   *  150*   < > 145  144 138 128*   K 4.4  4.6   < > 3.8  3.8 3.7 4.6   *  115*   < > 109  109 102 96*   CO2 24  25   < > 22  21 21 19*   PHOS 3.1  --  3.4  --  5.9*   BUN 50*  --  48*  --  58*   CREATININE 2.2*  --  2.5*  --  3.4*    < > = values in this interval not displayed.          Electronically signed by Jazzy Canales MD on 3/25/2020 at 9:16

## 2020-03-25 NOTE — PROGRESS NOTES
Occupational Therapy    [facility] ACUTE CARE OCCUPATIONAL THERAPY EVALUATION  [address], 1970, 2122/2122-A, 3/25/2020    History  [place]:  The primary encounter diagnosis was Acute renal failure, unspecified acute renal failure type ([facility] [address] Diagnoses of Leukocytosis, unspecified type, Lactic acidosis, and Hyponatremia were also pertinent to this visit. Patient  has a past medical history of COPD (chronic obstructive pulmonary disease) ([facility] [address] History of alcohol abuse, History of idiopathic seizure, Hypertension, and Panic attack. Patient  has a past surgical history that includes Foot surgery and Tonsillectomy. Subjective:  Patient states: \"This is my first time up\"  Pain:  8/10 pain in bilateral toes. Communication with other providers:  Handoff to RN, co-eval with PT. Restrictions: Frostbite In bilateral toes, General Precautions, Fall Risk    Home Setup/Prior level of function  Social/Functional History  ADL Assistance: Independent  Homemaking Assistance: (homeless)  Ambulation Assistance: Independent  Transfer Assistance: Independent  Type of occupation: Pt is homeless  Additional Comments: Pt reports no recent falls in last 6 months    Examination of body systems (includes body structures/functions, activity/participation limitations):  · Observation:  Supine in bed upon arrival, agreeable to therapy. BP supinein bed: 144/57, sitting EOB: 127/88 and sitting upright in chair: 108/75  · Vision:  Reading glasses  · [facility]  · Cardiopulmonary:  No 02 needs      Body Systems and functions:  · ROM R/L:  WFL.     · Strength R/L:  4-/5,   · Sensation: WFL  · Tone: Normal  · Coordination: WFL  · Perception: WNL    Activities of Daily Living (ADLs):  · Feeding: Christian  · Grooming: CGA (washing face w/ warm washcloth sitting EOB)  · UB bathing: SBA  · LB bathing: CGA  · UB dressing: SBA  · LB dressing: maxA (donning socks supine in bed)  · Toileting: Colin    Cognitive and Psychosocial

## 2020-03-25 NOTE — PROGRESS NOTES
Physical Therapy  [facility] ACUTE CARE PHYSICAL THERAPY EVALUATION  [address], 1970, 2122/2122-A, 3/25/2020    History  [place]:  The primary encounter diagnosis was Acute renal failure, unspecified acute renal failure type ([facility] [address] Diagnoses of Leukocytosis, unspecified type, Lactic acidosis, and Hyponatremia were also pertinent to this visit. Patient  has a past medical history of COPD (chronic obstructive pulmonary disease) ([facility] [address] History of alcohol abuse, History of idiopathic seizure, Hypertension, and Panic attack. Patient  has a past surgical history that includes Foot surgery and Tonsillectomy. Subjective:  Patient states:  \"I haven't been up yet. Only sitting on the edge of the bed\"   Pain:  8/10 bilat toes   Communication with other providers:  [facility]. Co-eval with Angely FARIA   Restrictions: general precautions, fall risk, marina, rectal tube, portable tele, IV, NG (suction)     Home Setup/Prior level of function  Social/Functional History  Type of Home: Homeless  ADL Assistance: Independent  Ambulation Assistance: Independent  Transfer Assistance: Independent  Active : No  Mode of Transportation: Walk  Type of occupation: Pt is homeless  Additional Comments: Pt reports no recent falls in last 6 months    Examination of body systems (includes body structures/functions, activity/participation limitations):  · Observation:  Supine in bed upon arrival. Agreeable and cooperative to work with therapy   · Vision:  [facility]   · Hearing:  [facility]   · Cardiopulmonary:  BP supine 144/57, sitting 127/88, and sitting upright in chair after ambulating 108/75    Musculoskeletal  · ROM R/L:  WFL BLEs. · Strength R/L:  BLEs grossly 4+/5. Dec strength observed in function and endurance. Mobility/treatment:   · Rolling L/R:  NT   · Supine to sit:  Colin for guidance with LEs to EOB (soreness limiting)   · Transfers:   · Sit to stand: Colin for steadying.  VCs for use of UEs from bed surface for power   · Stand to sit: Colin for eccentric control with descend. VCs for UE sequencing to reach back for recliner chair arm rests   · Step pivot: with RW CGA for safety. VCs for sequencing full pivot turn with AD prior to sitting. · Sitting balance:  SBA at EOB static and light dynamic with single UE support. CGA for safety with no UE support and light dynamic activity   · Standing balance:  CGA at RW. Fwd posture with heavy reliance on RW through UEs due to painful feet. · Gait: 10ft with RW CGA for safety. Very slow, timid step. Fwd posture with dec bilat step length. Pain limiting distance as well as feeling globally weak and slightly light headed. · Educate pt on POC, role of PT, DME use. VCs for sequencing, weight shift, UE placement to inc safety and indep with mobility. [facility] 6 Clicks Inpatient Mobility:  AM-PAC Inpatient Mobility Raw Score : 17  Safety: patient left in chair with chair alarm, call light within reach, gait belt used. Assessment: Body structures, Functions, Activity limitations: Decreased functional mobility ; Decreased safe awareness; Decreased endurance; Decreased balance; Increased pain; Decreased strength  Pt is a 44year old male admitted with acute renal failure, sepsis, and suspected ileus 2* acute pancreatitis. Pt had code called upon arrival to ED and CPR was performed. Recommend subacute rehab once medically stable. At baseline, he is indep with gross mobility and ADLs. He is currently Colin functioning below his typical baseline. He would benefit from continued therapy to address his current deficits, dec potential fall risk, and restore PLOF. Complexity: Moderate  Prognosis: Good, no significant barriers to participation at this time.    Plan Times per week: 3+/week  Discharge Recommendations: Subacute/Skilled Nursing Facility  Equipment: RW    Goals:  Short term goals  Time Frame for Short term goals: 1 week   Short term goal 1: Pt will perform sit><supine Christian Short term goal 2: Pt will transfer sit><stand Christian   Short term goal 3: Pt will transfer between surfaces Christian   Short term goal 4: Pt will ambulate 100ft with LRAD Christian        Treatment plan:  Strengthening; Balance Training; Transfer Training; ADL/Self-care Training; Functional Mobility Training; ROM; Endurance Training; Gait Training; IADL Training; Stair training; Neuromuscular Re-education; Wheelchair Mobility Training; Home Exercise Program; Patient/Caregiver Education & Training; Modalities; Positioning; Equipment Evaluation, Education, & procurement;  Safety Education & Training    Recommendations for NURSING mobility: ambulate to bathroom with RW     Time:   Time in: 1030  Time out: 1055  Timed treatment minutes: 10  Total time: 25    Electronically signed by:    Matias Christy [id number]  3/25/2020, 3:03 PM

## 2020-03-26 NOTE — PROGRESS NOTES
output  Respiratory: normal effort, no wheezes appreciated  CV: appears well perfused  Abdomen: Soft, non tender to palpation, moderately distended  Skin: warm and dry  Extremities: atraumatic  Psych: restless    Assessment and Plan:  44 y.o. male with multiple medical problems. Presented with acute pancreatitis. I suspect he has an ileus secondary to intra-abdominal inflammation due to his pancreatitis. Tolerating clears with NG clamped. NG removed today.     Patient Active Problem List:     Panic attack     Anxiety     Abnormal LFTs     Steatohepatitis     Chronic obstructive pulmonary disease (HCC)     Cramps of lower extremity     Alcoholic hepatitis without ascites     Essential hypertension     Simple chronic bronchitis (HCC)     BETTY (acute kidney injury) ([address]     Alcohol abuse     Lumbosacral radiculopathy at S1     Acute renal failure (ARF) ([address]    - regular diet, encouraged him to take only small amounts  - will continue to follow    Shlomo Hill MD   3/26/2020  11:59 AM

## 2020-03-27 NOTE — PLAN OF CARE
violence  Outcome: Ongoing     Problem: Skin Integrity:  Goal: Will show no infection signs and symptoms  Description: Will show no infection signs and symptoms  Outcome: Ongoing  Goal: Absence of new skin breakdown  Description: Absence of new skin breakdown  Outcome: Ongoing

## 2020-03-27 NOTE — PROGRESS NOTES
Nephrology Progress Note        [address], [address] [facility], [place] [address]  Phone: [phone]  Office Hours: 8:30AM - 4:30PM  Monday - Friday       [facility]  Ave Parnell MD  [id number],   [address],  [address]  [facility], [place] [address]  PHONE: [phone]  FAX: [phone]    3/27/2020 7:12 AM  Subjective:   Admit Date: 3/16/2020  PCP: Devin Morfin MD  Interval History: UOP of 2L  Reports eating a cheeseburger last night  NG tube is out    Diet: DIET GENERAL; Low Potassium      Data:   Scheduled Meds:   metoprolol tartrate  100 mg Oral BID    insulin lispro  0-12 Units Subcutaneous Q4H    hydrALAZINE  50 mg Oral 3 times per day    sodium chloride flush  10 mL Intravenous 2 times per day    nicotine  1 patch Transdermal Daily    sodium chloride flush  10 mL Intravenous 2 times per day    folic acid  1 mg Oral Daily    thiamine  100 mg Oral Daily    sodium chloride flush  10 mL Intravenous 2 times per day    heparin (porcine)  5,000 Units Subcutaneous 3 times per day    PARoxetine  20 mg Oral Daily    tiotropium  2 puff Inhalation Daily    pantoprazole  40 mg Intravenous Daily     Continuous Infusions:   sodium bicarbonate infusion      dextrose       PRN Meds:glucose, dextrose, glucagon (rDNA), dextrose, traMADol, acetaminophen, sodium chloride flush, albuterol, benzocaine, nicotine polacrilex, potassium chloride, magnesium sulfate, sodium chloride flush, LORazepam **OR** LORazepam **OR** LORazepam **OR** LORazepam **OR** LORazepam **OR** LORazepam **OR** LORazepam **OR** LORazepam, sodium chloride flush, polyethylene glycol, ondansetron **OR** ondansetron  I/O last 3 completed shifts: In: 1104 [I.V.:600]  Out: 2075 [Urine:2075]  No intake/output data recorded.     Intake/Output Summary (Last 24 hours) at 3/27/2020 2079  Last data filed at 3/27/2020 0600  Gross per 24 hour   Intake 1104 ml   Output 2075 ml   Net -971 ml       CBC: nephrotoxins  - Continue to measure UOP  - Daily bmp                    Electronically signed by Kiel Xavier DO on 3/27/2020 at [address], [address]  [place], [place] [address]  PHONE: [phone]  FAX: [phone]

## 2020-03-27 NOTE — ADT AUTH CERT
Panic attack      Anxiety      Abnormal LFTs      Steatohepatitis      Chronic obstructive pulmonary disease (HCC)      Cramps of lower extremity      Alcoholic hepatitis without ascites      Essential hypertension      Simple chronic bronchitis (HCC)      BETTY (acute kidney injury) ([address]      Alcohol abuse      Lumbosacral radiculopathy at S1      Acute renal failure (ARF) (HCC)      Per IM PN:  Assessment and Plan:   SIRS sec to acute pancreatitis   - clinically improved   - on diet now   - d/c vanc + cefepime, do not see any infectious source and agree Vanc can worsen renal fx, WBC improving   - blood cx NTD   - UA no signs of infection   - RESP PCR neg   - check ct abd/pelvis: acute pancreatitis with no necrosis or pseudocyst   - h/o alcohol, RUQ US with sludge no cholelithiasis       Ileus , partial bowel obstruction.     - improved   - TPN d/c   - NGT out   - tolerating diet           Alcohol dependence with withdrawal , DT   -stable   - oral, thiamine, folic acid   - No s/sx of withdrawal anymore   - CIWA with ativan prn.    - 3/21 Started on precedex IV, off now       Acute renal failure,  (baseline 0.9)   - worsening   - IVF bicarb gtt   - Consulted Nephro; holding ACEi   - d/c neurontin   - d/c Vanc   - may need HD if no improvement       Pseudohyponatremia   - resolved   - correct glucose, d/c D5       Early signs of frostbite   Peripheral vascular disease   - bilateral toes discoloration and impaired sensation   - concern about frost bite injury, arterial doppler with no major abnormality   - consulted surgery, vascular surgery   - conservative management. Neurontin, Oxycodone prn pain       Transient AMS/unresponsiveness   - resolved   - In ED while not on telemetry- unconfirmed pulselessness    - No defibrillation/ACLS meds given; CPR done for 10 - 15 seconds   - CT head without acute process; CTA chest- no PE   - Immediately AAOX4 and on room air after episode   - 3/21 having DT's, started on precedex prn.    - 3/21 Started on precedex IV, off now       Acute renal failure,  (baseline 0.9)   - worsening   - IVF   - Consulted Nephro; holding ACEi   - d/c neurontin   - d/c Vanc   - may need HD if no improvement       Pseudohyponatremia   - resolved   - correct glucose, d/c D5       Early signs of frostbite   Peripheral vascular disease   - bilateral toes discoloration and impaired sensation   - concern about frost bite injury, arterial doppler with no major abnormality   - consulted surgery, vascular surgery   - conservative management. Neurontin, Oxycodone prn pain       Transient AMS/unresponsiveness   - resolved   - In ED while not on telemetry- unconfirmed pulselessness    - No defibrillation/ACLS meds given; CPR done for 10 - 15 seconds   - CT head without acute process; CTA chest- no PE   - Immediately AAOX4 and on room air after episode   - 3/21 having DT's, started on precedex   - 3/23 improved mental status       Essential HTN   -stable   - hydralazine + metoprolol       Hyperglycemia   - improved, likely from D5 infusion    - No known hx of DM, A1c 5.0       Homeless   - Consulted to CM for d/c planning needs       Per Nephrology PN:  Assessment and Plan:   - BETTY from ATN //cr 2.7 from baseline of 0.9//received iv contrast on admission so some NGA is also involved//no HN on imaging studies   - Hyponatremia: resolved   - hypernatremia; from NS load: resolved   - hyperkalemia from K in IVF; resolved   - Anion gap metabolic acidosis   - Acute pancreatitis: lipase of 3000   - transaminitis   - Hx of alcohol abuse: had supposedly quit, serum alcohol was positive   - alcohol withdrawal   - ileus   - HTN    - Copd    - Anxiety   - hypokalemia       Plan:   - -Continue supportive mgmt   - Have decreased NS to 50ml to prevent fluid overload , he is already showing bilateral pleural effusions   - Discussed with him the possible need for HD, he is agreeable if needed, will assess daily, hopefully he will not end up

## 2020-03-27 NOTE — PLAN OF CARE
Problem: Pain:  Goal: Pain level will decrease  Description: Pain level will decrease  3/27/2020 1731 by Otilio Armendariz RN  Outcome: Ongoing  3/27/2020 0915 by Gold Bonilla RN  Outcome: Ongoing  Goal: Control of acute pain  Description: Control of acute pain  3/27/2020 1731 by Otilio Armendariz RN  Outcome: Ongoing  3/27/2020 0915 by Gold Bonilla RN  Outcome: Ongoing  Goal: Control of chronic pain  Description: Control of chronic pain  3/27/2020 1731 by Otilio rAmendariz RN  Outcome: Ongoing  3/27/2020 0915 by Gold Bonilla RN  Outcome: Ongoing     Problem: Falls - Risk of:  Goal: Will remain free from falls  Description: Will remain free from falls  3/27/2020 1731 by Otilio Armendariz RN  Outcome: Ongoing  3/27/2020 0915 by Gold Bonilla RN  Outcome: Ongoing  Goal: Absence of physical injury  Description: Absence of physical injury  3/27/2020 1731 by Otilio Armendariz RN  Outcome: Ongoing  3/27/2020 0915 by Gold Bonilla RN  Outcome: Ongoing     Problem: Fluid Volume:  Goal: Ability to achieve a balanced intake and output will improve  Description: Ability to achieve a balanced intake and output will improve  3/27/2020 1731 by Otilio Armendariz RN  Outcome: Ongoing  3/27/2020 0915 by Gold Bonilla RN  Outcome: Ongoing     Problem: Physical Regulation:  Goal: Ability to maintain clinical measurements within normal limits will improve  Description: Ability to maintain clinical measurements within normal limits will improve  3/27/2020 1731 by Otilio Armendariz RN  Outcome: Ongoing  3/27/2020 0915 by Gold Bonilla RN  Outcome: Ongoing  Goal: Will show no signs and symptoms of electrolyte imbalance  Description: Will show no signs and symptoms of electrolyte imbalance  3/27/2020 1731 by Otilio Armendariz RN  Outcome: Ongoing  3/27/2020 0915 by Gold Bonilla RN  Outcome: Ongoing     Problem: Discharge Planning:  Goal: Discharged to appropriate level of care  Description: Discharged to appropriate level of

## 2020-03-27 NOTE — PROGRESS NOTES
GENERAL SURGERY PROGRESS NOTE    Rosales Bryan is a 44 y.o. male with suspected ileus secondary to severe acute pancreatitis. Subjective: Tolerating regular diet. Continues to have bowel function. Distention stable or mildly improved.     Objective:    Vitals: VITALS:  /87   Pulse 90   Temp 98.1 °F (36.7 °C)   Resp 30   Ht 6' 2\" (1.88 m)   Wt 189 lb 9.5 oz (86 kg)   SpO2 96%   BMI 24.34 kg/m²     I/O: 03/26 0701 - 03/27 0700  In: 1104 [I.V.:600]  Out: 2075 [Urine:2075]    Labs/Imaging Results:     Lab Results   Component Value Date    WBC 16.6 (H) 03/26/2020    HGB 7.8 (L) 03/26/2020    HCT 23.6 (L) 03/26/2020    MCV 92.2 03/26/2020     03/26/2020     Lab Results   Component Value Date     03/27/2020    K 4.5 03/27/2020     03/27/2020    CO2 16 03/27/2020    BUN 95 03/27/2020    CREATININE 5.1 03/27/2020    GLUCOSE 96 03/27/2020    CALCIUM 7.2 03/27/2020        IV Fluids:   sodium bicarbonate infusion Last Rate: 40 mL/hr at 03/27/20 0847    dextrose    Scheduled Meds: sodium bicarbonate, 1,300 mg, Oral, TID    metoprolol tartrate, 100 mg, Oral, BID    insulin lispro, 0-12 Units, Subcutaneous, Q4H    hydrALAZINE, 50 mg, Oral, 3 times per day    sodium chloride flush, 10 mL, Intravenous, 2 times per day    nicotine, 1 patch, Transdermal, Daily    sodium chloride flush, 10 mL, Intravenous, 2 times per day    folic acid, 1 mg, Oral, Daily    thiamine, 100 mg, Oral, Daily    sodium chloride flush, 10 mL, Intravenous, 2 times per day    heparin (porcine), 5,000 Units, Subcutaneous, 3 times per day    PARoxetine, 20 mg, Oral, Daily    tiotropium, 2 puff, Inhalation, Daily    pantoprazole, 40 mg, Intravenous, Daily    Physical Exam:  General: Answers questions appropriately, in no acute distress  HEENT:  NG in place with clear output  Respiratory: normal effort, no wheezes appreciated  CV: appears well perfused  Abdomen: Soft, non tender to palpation,

## 2020-03-27 NOTE — PROGRESS NOTES
Or  LORazepam, 4 mg, Q1H PRN    Or  LORazepam, 4 mg, Q1H PRN  sodium chloride flush, 10 mL, PRN  polyethylene glycol, 17 g, Daily PRN  ondansetron, 4 mg, Q8H PRN    Or  ondansetron, 4 mg, Q6H PRN      Subjective:     Doing ok, no distress  Objective: Intake/Output Summary (Last 24 hours) at 3/27/2020 0840  Last data filed at 3/27/2020 0600  Gross per 24 hour   Intake 1104 ml   Output 2075 ml   Net -971 ml      Vitals:   Vitals:    03/27/20 0605   BP: 132/87   Pulse: 93   Resp: 25   Temp:    SpO2: 96%     Physical Exam:   Gen:  awake, alert, cooperative, no apparent distress  Head/Eyes:  Normocephalic atraumatic, EOMI   NECK:   symmetrical, trachea midline  LUNGS: Normal Effort   CARDIOVASCULAR:  Normal rate  ABDOMEN: Non tender, non distended, no HSM noted. MUSCULOSKELETAL:  ROM WNL  NEUROLOGIC: Alert and Oriented,  Cranial nerves II-XII are grossly intact.    SKIN:  no bruising or bleeding, normal skin color,  no redness      Data:       CBC   Recent Labs     03/25/20  0444 03/26/20  0432   WBC 20.9* 16.6*   HGB 8.6* 7.8*   HCT 25.3* 23.6*    298      BMP   Recent Labs     03/25/20  0444 03/25/20  1647 03/26/20  0432 03/27/20  0500   * 132* 136 134*   K 4.6 3.5 3.9 4.5   CL 96* 99 103 103   CO2 19* 20* 18* 16*   PHOS 5.9* 4.8 4.9  --    BUN 58* 73* 83* 95*   CREATININE 3.4* 3.9* 4.4* 5.1*         Electronically signed by Yael Simpson MD on 3/27/2020 at 8:40 AM

## 2020-03-27 NOTE — PROGRESS NOTES
(86.2 kg), % Ideal Body 99%  · BMI Classification: BMI 18.5 - 24.9 Normal Weight    Nutrition Interventions:   Continue current diet  Continued Inpatient Monitoring, Education not appropriate at this time, Coordination of Care    Nutrition Evaluation:   · Evaluation: Goals set   · Goals: pt will consume greater than 75% of his meals    · Monitoring: Meal Intake, Weight, Pertinent Labs, Mental Status/Confusion      Electronically signed by Shayne Grey RD, KAYLEE on 0/66/62 at 11:28 AM EDT    Contact Number: [phone]

## 2020-03-28 NOTE — PROGRESS NOTES
Hospitalist Progress Note      Name:  Chema Vo /Age/Sex: 1970  (44 y.o. male)   MRN & CSN:  [id number] & [id number] Admission Date/Time: 3/16/2020  5:52 PM   Location:  -A PCP: Sebastian Fountain MD       Chema Vo is a 44 y.o.  male  who presents with Shortness of Breath; Chest Pain (left sternal, constant 10/10 burning chest pain.); and Fatigue      Assessment and Plan:   SIRS sec to acute pancreatitis  - clinically improved   - on diet now  - d/c vanc + cefepime, do not see any infectious source and agree Vanc can worsen renal fx, WBC improving  - blood cx NTD  - UA no signs of infection  - RESP PCR neg  - check ct abd/pelvis: acute pancreatitis with no necrosis or pseudocyst  - h/o alcohol, RUQ US with sludge no cholelithiasis     Ileus , partial bowel obstruction.    - improved  - TPN d/c  - NGT out  - tolerating diet      Alcohol dependence with withdrawal , DT  -stable  - oral, thiamine, folic acid  - No s/sx of withdrawal anymore  - CIWA with ativan prn.   - 3/21 Started on precedex IV, off now     Acute renal failure,  (baseline 0.9)  - worsening  - IVF bicarb gtt  - Consulted Nephro; holding ACEi  - d/c neurontin  - d/c Vanc  - may need HD if no improvement     Pseudohyponatremia  - resolved  - correct glucose, d/c D5     Early signs of frostbite  Peripheral vascular disease  -stable  - bilateral toes discoloration and impaired sensation  - concern about frost bite injury, arterial doppler with no major abnormality  - consulted surgery, vascular surgery  - conservative management. Neurontin, Oxycodone prn pain     Transient AMS/unresponsiveness  - resolved  - In ED while not on telemetry- unconfirmed pulselessness   - No defibrillation/ACLS meds given; CPR done for 10 - 15 seconds  - CT head without acute process; CTA chest- no PE  - Immediately AAOX4 and on room air after episode  - 3/21 having DT's, started on precedex  - 3/23 improved mental status     Essential mL, PRN  LORazepam, 1 mg, Q1H PRN    Or  LORazepam, 1 mg, Q1H PRN    Or  LORazepam, 2 mg, Q1H PRN    Or  LORazepam, 2 mg, Q1H PRN    Or  LORazepam, 3 mg, Q1H PRN    Or  LORazepam, 3 mg, Q1H PRN    Or  LORazepam, 4 mg, Q1H PRN    Or  LORazepam, 4 mg, Q1H PRN  sodium chloride flush, 10 mL, PRN  polyethylene glycol, 17 g, Daily PRN  ondansetron, 4 mg, Q8H PRN    Or  ondansetron, 4 mg, Q6H PRN      Subjective:     +flatus, no pain  Objective: Intake/Output Summary (Last 24 hours) at 3/28/2020 0903  Last data filed at 3/28/2020 0600  Gross per 24 hour   Intake 1065 ml   Output 1950 ml   Net -885 ml      Vitals:   Vitals:    03/28/20 0821   BP: 135/86   Pulse: 110   Resp: 26   Temp: 98.1 °F (36.7 °C)   SpO2: 92%     Physical Exam:   Gen:  awake, alert, cooperative, no apparent distress  Head/Eyes:  Normocephalic atraumatic, EOMI   NECK:   symmetrical, trachea midline  LUNGS: Normal Effort   CARDIOVASCULAR:  Normal rate  ABDOMEN: Non tender, +distended, no HSM noted. MUSCULOSKELETAL:  ROM WNL  NEUROLOGIC: Alert and Oriented,  Cranial nerves II-XII are grossly intact.    SKIN:  no bruising or bleeding, normal skin color,  no redness      Data:       CBC   Recent Labs     03/26/20  0432 03/28/20  0500   WBC 16.6* 14.2*   HGB 7.8* 7.0*   HCT 23.6* 22.3*    377      BMP   Recent Labs     03/25/20  1647 03/26/20  0432 03/27/20  0500 03/28/20  0500   * 136 134* 130*   K 3.5 3.9 4.5 4.4   CL 99 103 103 95*   CO2 20* 18* 16* 19*   PHOS 4.8 4.9  --   --    BUN 73* 83* 95* 97*   CREATININE 3.9* 4.4* 5.1* 5.5*         Electronically signed by Robert Lin MD on 3/28/2020 at 9:03 AM

## 2020-03-28 NOTE — PLAN OF CARE
symptoms  Description: Will show no infection signs and symptoms  Outcome: Ongoing  Goal: Absence of new skin breakdown  Description: Absence of new skin breakdown  Outcome: Ongoing

## 2020-03-29 NOTE — PROGRESS NOTES
03/29/20  0545   WBC 14.2* 15.6*   HGB 7.0* 7.0*    458*       BMP:    Recent Labs     03/27/20  0500 03/28/20  0500 03/29/20  0545   * 130* 135   K 4.5 4.4 4.3    95* 101   CO2 16* 19* 18*   BUN 95* 97* 99*   CREATININE 5.1* 5.5* 5.9*   GLUCOSE 96 91 109*     Hepatic: No results for input(s): AST, ALT, ALB, BILITOT, ALKPHOS in the last 72 hours. Troponin: No results for input(s): TROPONINI in the last 72 hours. BNP: No results for input(s): BNP in the last 72 hours. Lipids: No results for input(s): CHOL, HDL in the last 72 hours. Invalid input(s): LDLCALCU  ABGs: No results found for: PHART, PO2ART, JCQ0ESP  INR: No results for input(s): INR in the last 72 hours.     Objective:   Vitals: BP (!) 154/94   Pulse 92   Temp 99.3 °F (37.4 °C) (Oral)   Resp 24   Ht 6' 2\" (1.88 m)   Wt 191 lb 12.8 oz (87 kg)   SpO2 91%   BMI 24.63 kg/m²   General appearance: alert and cooperative with exam, in no acute distress  HEENT: normocephalic, atraumatic,   Neck: supple, trachea midline  Lungs:  breathing comfortably on room air  Heart[de-identified] tachycardic  Abdomen: soft,   Extremities: bilat ischemic changes of all toes- f?frost bite looking  Neurologic: alert, oriented, follows commands, interactive    Assessment and Plan:   - BETTY- likely ATI- may be tail end of Rhabdo  Output excellent in negative balance  Acidosis multifactorial  Clinically volume depleted - negative 6 L since admit  Plan:  Cont  NS at [facility] for strict intake output- goal is to maintain euvolemia  Cont Bicarb PO  Dr Drew Strickland will follow                  Electronically signed by Artie Vargas MD on 3/29/2020 at MD Alisson Hubbard, DO  [facility],  Jaun Cordero  [facility], [place] [address]  PHONE: [phone]  FAX: [phone]

## 2020-03-29 NOTE — PROGRESS NOTES
HTN  -stable  - hydralazine + metoprolol     Hyperglycemia  - improved, likely from D5 infusion   - No known hx of DM, A1c 5.0     Homeless  - Consulted to  for d/c planning needs      HypoMG  - replaced     Sinus Tachy  - improved  - replaced MG  - start lopressor, increased to 100 BID  - check echo: ef 50-55%  - monitor     Mod B/L Pleural Effusions  - s/p IR thoracentesis 3/26: 450cc from right side, 900cc from left side     Anemia  - no signs of gross bleeding, could be dilutional from fluids  - check iron panel: low, give IV venofer x 3 doses  - check FOBT  - transfuse if <7.0  - monitor CBC  - d/c heparin for now    Acute Resp Failure  - no wheezing but increased WOB noted  - check CXR  - NC O2 and wean as tolerated     Chronic  - COPD- Not in exacerbation; cont home spiriva, albuterol  - Neuropathy- hold neurontin  - Depression- Cont paxil     SCD for dvt ppx            Diet DIET GENERAL; Low Potassium   Code Status Full Code     Medications:   Medications:    iron sucrose  300 mg Intravenous Q24H    sodium bicarbonate  650 mg Oral TID    metoprolol tartrate  100 mg Oral BID    insulin lispro  0-12 Units Subcutaneous Q4H    hydrALAZINE  50 mg Oral 3 times per day    sodium chloride flush  10 mL Intravenous 2 times per day    nicotine  1 patch Transdermal Daily    sodium chloride flush  10 mL Intravenous 2 times per day    folic acid  1 mg Oral Daily    thiamine  100 mg Oral Daily    sodium chloride flush  10 mL Intravenous 2 times per day    PARoxetine  20 mg Oral Daily    tiotropium  2 puff Inhalation Daily    pantoprazole  40 mg Intravenous Daily      Infusions:    sodium chloride 100 mL/hr at 03/28/20 2001    dextrose       PRN Meds: glucose, 15 g, PRN  dextrose, 12.5 g, PRN  glucagon (rDNA), 1 mg, PRN  dextrose, 100 mL/hr, PRN  traMADol, 50 mg, Q6H PRN  acetaminophen, 650 mg, Q4H PRN  sodium chloride flush, 10 mL, PRN  albuterol, 2.5 mg, Q6H PRN  benzocaine, , Q1H PRN  nicotine

## 2020-03-30 NOTE — PROGRESS NOTES
Physical Therapy  Pt just returned from IR for a thoracentesis, pt is c/o a lot of pain all over and is unable to do therapy now. Will cont. Ishaan Modi.  Kinsey Botaeng PTA

## 2020-03-30 NOTE — PROGRESS NOTES
Hospitalist Progress Note      Name:  Rosales Bryan /Age/Sex: 1970  (44 y.o. male)   MRN & CSN:  [id number] & [id number] Admission Date/Time: 3/16/2020  5:52 PM   Location:  -A PCP: Woody Best MD       Rosales Bryan is a 44 y.o.  male  who presents with Shortness of Breath;  Chest Pain (left sternal, constant 10/10 burning chest pain.); and Fatigue      Assessment and Plan:   Acute pancreatitis  - clinically improved   - on diet now  - check ct abd/pelvis: acute pancreatitis with no necrosis or pseudocyst  - h/o alcohol, RUQ US with sludge no cholelithiasis     Ileus , partial bowel obstruction.   - improved  - TPN d/c  - NGT out  - tolerating diet      Alcohol dependence with withdrawal , DT  -stable  - oral, thiamine, folic acid  - No s/sx of withdrawal anymore  - CIWA with ativan prn.   - 3/21 Started on precedex IV, off now     Acute renal failure,  (baseline 0.9)  - IVF NS  - Consulted Nephro; holding ACEi  - d/c neurontin  - d/c Vanc  - may need HD if no improvement     Pseudohyponatremia  - resolved  - correct glucose, d/c D5     Early signs of frostbite  Peripheral vascular disease  -stable  - bilateral toes discoloration and impaired sensation  - concern about frost bite injury, arterial doppler with no major abnormality  - consulted surgery, vascular surgery  - conservative management. Neurontin, Oxycodone prn pain     Transient AMS/unresponsiveness  - resolved  - In ED while not on telemetry- unconfirmed pulselessness   - No defibrillation/ACLS meds given; CPR done for 10 - 15 seconds  - CT head without acute process; CTA chest- no PE  - Immediately AAOX4 and on room air after episode  - 3/21 having DT's, started on precedex  - 3/23 improved mental status     Essential HTN  -stable  - hydralazine + metoprolol     Hyperglycemia  - improved, likely from D5 infusion   - No known hx of DM, A1c 5.0     Homeless  - Consulted to  for d/c planning needs      HypoMG  - replaced     Sinus

## 2020-03-30 NOTE — PROGRESS NOTES
continue oral bicarb                     Electronically signed by Jennifer Montes DO on 3/30/2020 at MD Alisson Hubbard DO [address],  Jaun BRIGGS [facility], [facility] [place] [address]  PHONE: [phone]  FAX: [phone]

## 2020-03-30 NOTE — PROGRESS NOTES
Assume care for the patient. [facility] Patient alert and oriented. No s/s of infection. All fall intervention in place.

## 2020-03-30 NOTE — PROGRESS NOTES
Patient's SPO2 was dropping into high 80's but patient was able to recover. Placed patient on the high flow canula. SPO2 came up to 92%.

## 2020-03-30 NOTE — PROGRESS NOTES
NP Clyde Figueroa was notified \"Patient has been having difficulties with his oxygen saturation. Increased the oxygen to 6 L but this morning the patient SPO2 continue to drop into o low 80's. Patient at this time is on 15 liter high flow. His SPO2 is now 92%. Patient's respiration are in the high 30's and HR is elevated in the high 120's. Pt had fluid removed from his lungs last week. Is there anything that you want us to do for this patient? \".

## 2020-03-31 NOTE — PLAN OF CARE
Problem: Pain:  Goal: Pain level will decrease  Description: Pain level will decrease  3/31/2020 0944 by Candy Her RN  Outcome: Ongoing  3/30/2020 2308 by Kimber Lopez RN  Outcome: Ongoing  3/30/2020 2307 by Kimber Lopez RN  Outcome: Ongoing  Goal: Control of acute pain  Description: Control of acute pain  3/31/2020 0944 by Candy Her RN  Outcome: Ongoing  3/30/2020 2308 by Kimber Lopez RN  Outcome: Ongoing  3/30/2020 2307 by Kimber Lopez RN  Outcome: Ongoing  Goal: Control of chronic pain  Description: Control of chronic pain  3/31/2020 0944 by Candy Her RN  Outcome: Ongoing  3/30/2020 2308 by Kimber Lopez RN  Outcome: Ongoing  3/30/2020 2307 by Kimber Lopez RN  Outcome: Ongoing     Problem: Falls - Risk of:  Goal: Will remain free from falls  Description: Will remain free from falls  3/31/2020 0944 by Candy Her RN  Outcome: Ongoing  3/30/2020 2308 by Kimber Lopez RN  Outcome: Ongoing  3/30/2020 2307 by Kimber Lopez RN  Outcome: Ongoing  Goal: Absence of physical injury  Description: Absence of physical injury  3/31/2020 0944 by Candy Her RN  Outcome: Ongoing  3/30/2020 2308 by Kimber Lopez RN  Outcome: Ongoing  3/30/2020 2307 by Kimber Lopez RN  Outcome: Ongoing     Problem: Fluid Volume:  Goal: Ability to achieve a balanced intake and output will improve  Description: Ability to achieve a balanced intake and output will improve  3/31/2020 0944 by Candy Her RN  Outcome: Ongoing  3/30/2020 2308 by Kimber Lopez RN  Outcome: Ongoing  3/30/2020 2307 by Kimber Lopez RN  Outcome: Ongoing     Problem: Physical Regulation:  Goal: Ability to maintain clinical measurements within normal limits will improve  Description: Ability to maintain clinical measurements within normal limits will improve  3/31/2020 0944 by Candy Her RN  Outcome: Ongoing  3/30/2020 2308 by Ezio Hill Pallavi Dominguez RN  Outcome: Ongoing  3/30/2020 2307 by Donaldo Neff RN  Outcome: Ongoing  Goal: Will show no signs and symptoms of electrolyte imbalance  Description: Will show no signs and symptoms of electrolyte imbalance  3/31/2020 0944 by Gerson Durant RN  Outcome: Ongoing  3/30/2020 2308 by Donaldo Neff RN  Outcome: Ongoing  3/30/2020 2307 by Donaldo Neff RN  Outcome: Ongoing     Problem: Discharge Planning:  Goal: Discharged to appropriate level of care  Description: Discharged to appropriate level of care  3/31/2020 0944 by Gerson Durant RN  Outcome: Ongoing  3/30/2020 2308 by Donaldo Neff RN  Outcome: Ongoing  3/30/2020 2307 by Donaldo Neff RN  Outcome: Ongoing     Problem: Fluid Volume - Deficit:  Goal: Absence of fluid volume deficit signs and symptoms  Description: Absence of fluid volume deficit signs and symptoms  3/31/2020 0944 by Gerson Durant RN  Outcome: Ongoing  3/30/2020 2308 by Donaldo Neff RN  Outcome: Ongoing  3/30/2020 2307 by Donaldo Neff RN  Outcome: Ongoing     Problem: Nutrition Deficit:  Goal: Ability to achieve adequate nutritional intake will improve  Description: Ability to achieve adequate nutritional intake will improve  3/31/2020 0944 by Gerson Durant RN  Outcome: Ongoing  3/30/2020 2308 by Donaldo Neff RN  Outcome: Ongoing  3/30/2020 2307 by Donaldo Neff RN  Outcome: Ongoing     Problem: Sleep Pattern Disturbance:  Goal: Appears well-rested  Description: Appears well-rested  3/31/2020 0944 by Gerson Durant RN  Outcome: Ongoing  3/30/2020 2308 by Donaldo Neff RN  Outcome: Ongoing  3/30/2020 2307 by Donaldo Neff RN  Outcome: Ongoing     Problem: Violence - Risk of, Self/Other-Directed:  Goal: Knowledge of developmental care interventions  Description: Absence of violence  3/31/2020 0944 by Gerson Durant RN  Outcome: Ongoing  3/30/2020 2308 by Donaldo Neff RN  Outcome:

## 2020-03-31 NOTE — PROGRESS NOTES
Physical Therapy  Pt states he is having a hard time breathing today and does not want therapy now but want it at 0800 tomorrow. Will cont. Meghana Iyer.  Vik Tejadas PTA

## 2020-03-31 NOTE — PROGRESS NOTES
Nephrology Progress Note        [address], [address] [facility], [facility] [address]  Phone: [phone]  Office Hours: 8:30AM - 4:30PM  Monday - Friday       [facility]  Bindu Mullen MD  [address],   [facility],  [place]  [place], [place] [address]  PHONE: [phone]  FAX: [phone]    3/31/2020 8:10 AM  Subjective:   Admit Date: 3/16/2020  PCP: Dianna Martin MD  Interval History: nonoliguric  S/p thoracentesis yesterday  Remains on nc  ivf is off currently    Diet: DIET GENERAL; Low Potassium      Data:   Scheduled Meds:   sodium bicarbonate  1,300 mg Oral TID    linezolid  600 mg Intravenous Q12H    cefepime  1 g Intravenous Q12H    azithromycin  500 mg Intravenous Q24H    iron sucrose  300 mg Intravenous Q24H    metoprolol tartrate  100 mg Oral BID    insulin lispro  0-12 Units Subcutaneous Q4H    hydrALAZINE  50 mg Oral 3 times per day    sodium chloride flush  10 mL Intravenous 2 times per day    nicotine  1 patch Transdermal Daily    sodium chloride flush  10 mL Intravenous 2 times per day    folic acid  1 mg Oral Daily    thiamine  100 mg Oral Daily    sodium chloride flush  10 mL Intravenous 2 times per day    PARoxetine  20 mg Oral Daily    tiotropium  2 puff Inhalation Daily    pantoprazole  40 mg Intravenous Daily     Continuous Infusions:   dextrose       PRN Meds:LORazepam, glucose, dextrose, glucagon (rDNA), dextrose, traMADol, acetaminophen, sodium chloride flush, albuterol, benzocaine, nicotine polacrilex, potassium chloride, magnesium sulfate, sodium chloride flush, sodium chloride flush, polyethylene glycol, ondansetron **OR** ondansetron  I/O last 3 completed shifts: In: 1556 [I.V.:20; IV Piggyback:1150]  Out: 5581 [Urine:2250; Other:1775]  No intake/output data recorded.     Intake/Output Summary (Last 24 hours) at 3/31/2020 0810  Last data filed at 3/31/2020 0622  Gross per 24 hour   Intake 1170 ml   Output 4025 ml

## 2020-03-31 NOTE — CONSULTS
TID    linezolid  600 mg Intravenous Q12H    cefepime  1 g Intravenous Q12H    azithromycin  500 mg Intravenous Q24H    metoprolol tartrate  100 mg Oral BID    insulin lispro  0-12 Units Subcutaneous Q4H    hydrALAZINE  50 mg Oral 3 times per day    sodium chloride flush  10 mL Intravenous 2 times per day    nicotine  1 patch Transdermal Daily    sodium chloride flush  10 mL Intravenous 2 times per day    folic acid  1 mg Oral Daily    thiamine  100 mg Oral Daily    sodium chloride flush  10 mL Intravenous 2 times per day    PARoxetine  20 mg Oral Daily    tiotropium  2 puff Inhalation Daily    pantoprazole  40 mg Intravenous Daily     Allergies:    Social History:    TOBACCO:   reports that he has quit smoking. His smoking use included cigarettes. He started smoking about 4 years ago. He smoked 0.50 packs per day. He has never used smokeless tobacco.  ETOH:   reports current alcohol use. Patient currently lives independently    Family History:       Problem Relation Age of Onset    High Blood Pressure Mother     Other Mother         MS    COPD Mother     COPD Father     Alcohol Abuse Father          47    Tuberculosis Maternal Grandfather        REVIEW OF SYSTEMS:    CONSTITUTIONAL:  negative for fevers, chills, diaphoresis, activity change, appetite change, fatigue, night sweats and unexpected weight change.    EYES:  negative for blurred vision, eye discharge, visual disturbance and icterus  HEENT:  negative for hearing loss, tinnitus, ear drainage, sinus pressure, nasal congestion, epistaxis and snoring  RESPIRATORY:  See HPI  CARDIOVASCULAR:  negative for chest pain, palpitations, exertional chest pressure/discomfort, edema, syncope  GASTROINTESTINAL:  negative for nausea, vomiting, diarrhea, constipation, blood in stool and abdominal pain  GENITOURINARY:  negative for frequency, dysuria, urinary incontinence, decreased urine volume, and hematuria  HEMATOLOGIC/LYMPHATIC:  negative for effusion of the joints. No clubbing, cyanosis of the digits. SKIN:  normal skin color, texture, turgor and no redness, warmth, or swelling.  No palpable nodules    DATA:    Old records have been reviewed  CBC with Differential:    Lab Results   Component Value Date    WBC 16.2 03/31/2020    RBC 2.33 03/31/2020    HGB 7.0 03/31/2020    HCT 22.0 03/31/2020     03/31/2020    MCV 94.4 03/31/2020    MCH 30.0 03/31/2020    MCHC 31.8 03/31/2020    RDW 15.9 03/31/2020    SEGSPCT 90.8 03/19/2020    BANDSPCT 6 03/16/2020    LYMPHOPCT 1.8 03/19/2020    MONOPCT 5.6 03/19/2020    EOSPCT 1.6 07/19/2011    BASOPCT 0.2 03/19/2020    MONOSABS 1.3 03/19/2020    LYMPHSABS 0.4 03/19/2020    EOSABS 0.0 03/19/2020    BASOSABS 0.0 03/19/2020    DIFFTYPE  03/19/2020     AUTOMATED DIFF RESULTS CONFIRMED BY SMEAR REVIEW  AUTOMATED DIFFERENTIAL       BMP:    Lab Results   Component Value Date     03/31/2020    K 3.9 03/31/2020    CL 99 03/31/2020    CO2 20 03/31/2020    BUN 73 03/31/2020    CREATININE 5.4 03/31/2020    CALCIUM 7.7 03/31/2020    GFRAA 14 03/31/2020    LABGLOM 11 03/31/2020    GLUCOSE 94 03/31/2020     Hepatic Function Panel:    Lab Results   Component Value Date    ALKPHOS 93 03/23/2020    ALT 50 03/23/2020    AST 43 03/23/2020    PROT 4.7 03/23/2020    PROT 6.8 11/20/2012    BILITOT 0.6 03/23/2020    BILIDIR 0.3 03/19/2020    IBILI 0.3 03/19/2020     ABG:    Lab Results   Component Value Date    ITQ2TLI 20.6 03/31/2020    NVK6PNZ 31.0 03/31/2020    PO2ART 54 03/31/2020       Cultures:   Pending    Radiology Review:  reviewed        Assessment/Plan       Patient Active Problem List    Diagnosis Date Noted    Acute renal failure (ARF) ([address] 03/16/2020    Lumbosacral radiculopathy at S1 05/22/2019    BETTY (acute kidney injury) ([facility] 05/03/2017    Alcohol abuse 05/03/2017    Simple chronic bronchitis ([facility] 12/21/2016    Essential hypertension 12/14/2016    Cramps of lower extremity 71/95/2131    Alcoholic

## 2020-03-31 NOTE — CONSULTS
[place] [place] [address] Continence Nurse  Consult Note       Lei Saldaña  AGE: 44 y.o. GENDER: male  : 1970  TODAY'S DATE:  3/31/2020    Subjective:     Reason for Evaluation and Assessment: wound care f/u t 2nd toe new rt buttock wound      Clemente palafox a 44 y.o. male referred by:   [x] Physician  [] Nursing  [] Other:     Wound Identification:  Wound Type: frost bite  Contributing Factors: incontinence of stool        PAST MEDICAL HISTORY        Diagnosis Date    COPD (chronic obstructive pulmonary disease) ([facility] 2013    History of alcohol abuse     Quit early 2012    History of idiopathic seizure 2012    Hypertension     Panic attack        PAST SURGICAL HISTORY    Past Surgical History:   Procedure Laterality Date    FOOT SURGERY      right foot tendon release as a baby    TONSILLECTOMY         FAMILY HISTORY    Family History   Problem Relation Age of Onset    High Blood Pressure Mother     Other Mother         MS    COPD Mother     COPD Father     Alcohol Abuse Father          47    Tuberculosis Maternal Grandfather        SOCIAL HISTORY    Social History     Tobacco Use    Smoking status: Former Smoker     Packs/day: 0.50     Types: Cigarettes     Start date: 2015    Smokeless tobacco: Never Used   Substance Use Topics    Alcohol use: Yes     Comment: states stopped drinking in December here and there    Drug use: No       ALLERGIES    No Known Allergies    MEDICATIONS    No current facility-administered medications on file prior to encounter.       Current Outpatient Medications on File Prior to Encounter   Medication Sig Dispense Refill    tiotropium (SPIRIVA HANDIHALER) 18 MCG inhalation capsule Inhale 1 capsule into the lungs daily 30 capsule 5    lisinopril (ZESTRIL) 30 MG tablet Take 1 tablet by mouth daily 30 tablet 5    albuterol sulfate HFA (PROVENTIL HFA) 108 (90 Base) MCG/ACT inhaler Inhale 2 puffs into the lungs every 6 hours as needed for Wheezing

## 2020-04-01 NOTE — ADT AUTH CERT
cholelithiasis       Ileus , partial bowel obstruction.    - improved   - TPN d/c   - NGT out   - tolerating diet        Alcohol dependence with withdrawal , DT   -stable   - oral, thiamine, folic acid   - No s/sx of withdrawal anymore   - CIWA with ativan prn.    - 3/21 Started on precedex IV, off now       Acute renal failure,  (baseline 0.9)   - improving   - IVF NS d/c   - Consulted Nephro; holding ACEi   - d/c neurontin   - d/c Vanc   - may need HD if no improvement       Mild Hyponatremia   - monitor       Early signs of frostbite   Peripheral vascular disease   -stable   - bilateral toes discoloration and impaired sensation   - concern about frost bite injury, arterial doppler with no major abnormality   - consulted surgery, vascular surgery   - conservative management. Neurontin, Oxycodone prn pain       Transient AMS/unresponsiveness   - resolved   - In ED while not on telemetry- unconfirmed pulselessness    - No defibrillation/ACLS meds given; CPR done for 10 - 15 seconds   - CT head without acute process; CTA chest- no PE   - Immediately AAOX4 and on room air after episode   - 3/21 having DT's, started on precedex   - 3/23 improved mental status       Essential HTN   -stable   - hydralazine + metoprolol       Hyperglycemia   - improved, likely from D5 infusion    - No known hx of DM, A1c 5.0       Homeless   - Consulted to CM for d/c planning needs        HypoMG   - replaced       Sinus Tachy   - replaced MG   - start lopressor, increased to 100 BID   - check echo: ef 50-55%   - monitor       Mod B/L Pleural Effusions   - s/p IR thoracentesis 3/26: 450cc from right side, 900cc from left side   - consult IR for repeat thoracentesis 3/30: 600cc from right side and 1175cc from left removed       Anemia   - no signs of gross bleeding, could be dilutional from fluids   - check iron panel: low, give IV venofer x 3 doses   - check FOBT   - transfuse if <7.0   - monitor CBC   - d/c heparin for now       Acute Resp Failure likely 2/2 HAP   - start IV Zyvox + Cefepime + Zithromax   - check CXR: + b/l airspace disease vs effusions   - check CT chest: large b/l effusions noted, peribronchovascular consolidation and ground glass opacities noted   - check urine strep, legionella: neg   - check resp cx   - check resp pcr: neg   - check blood cx   - NC O2 and wean as tolerated   - consult pulm       Chest Pain   - will get v/q scan to r/o PE, noted that he had CTA chest few weeks ago but with being tachy, SOB and CP and also was off dvt ppx due to anemia we will re-check    - get trops x2   - get EKG   - consult cardio           Chronic   - COPD- Not in exacerbation; cont home spiriva, albuterol   - Neuropathy- hold neurontin   - Depression- Cont paxil       SCD for dvt ppx      Per Nephrology PN:       Assessment and Plan:   - BETTY from ATN //cr 2.7 from baseline of 0.9//received iv contrast on admission so some NGA is also involved//no HN on imaging studies//UA shows no rbcs and only 1 wbcs on admission   - Hyponatremia: resolved//had developed hypernatremia from NS load and it has resolved   - Anion gap metabolic acidosis   - Acute pancreatitis: lipase of 3000   - transaminitis   - Hx of alcohol abuse: had supposedly quit, serum alcohol was positive//alcohol withdrawal: resolved   - Ileus from pancreatitis: resolved   - HTN    - COPD   - Anxiety   - hypokalemia: resolved   - BIlateral pleural effusions; s/p tap   - acute hypoxic resp failure from pneumonia       Plan:   - cr down to 4.8 from 5.4 yesterday,    - serum sodium is better at 13 today   - not planning RRT as of now   - Avoid nephrotoxins   - Continue to measure UOP, ok to dc marina but continue urine measuring through urinal   - Daily bmp   - continue oral bicarb   - please avoid vancomycin for now              Sepsis and Other Febrile Illness, without Focal Infection - Care Day 16 (3/31/2020) by Wily Arthur RN         Review Status Review Entered   Completed 3/31/2020 14:25       Criteria Review      Care Day: 16 Care Date: 3/31/2020 Level of Care:    Guideline Day 4    Level Of Care    (X) Floor to discharge [J]    3/31/2020 2:25 PM EDT by Sunny Meckel      ICU    Clinical Status    (X) * Hemodynamic stability    3/31/2020 2:25 PM EDT by Sunny Meckel      HR   bp: 153/97    (X) * Fever absent or acceptable for next level of care    3/31/2020 2:25 PM EDT by Sunny Meckel      37.1    (X) * Hypoxemia absent    3/31/2020 2:25 PM EDT by Sunny Meckel      90-96% hi flow @ 6L    (X) * Tachypnea absent    3/31/2020 2:25 PM EDT by Sunny Meckel      rr 23-32    (X) * Cultures negative or infection identified and under adequate treatment    ( ) * Mental status at baseline    (X) * Metabolic derangement (eg, dehydration, acidosis) absent    ( ) * End-organ dysfunction (eg, myocardial ischemia, renal failure) absent    ( ) * Discharge plans and education understood    Activity    ( ) * Ambulatory    Routes    ( ) * Oral hydration, medications [K]    Interventions    (X) WBC    Medications    ( ) * Antimicrobial treatment not necessary or treatment at next level of care arranged [E]    * Milestone   Additional Notes   3/31  Day 16      Pt remains in ICU      Vitals: t: 37.1 p: 110 rr: 32 bp: 153/97 spo2: 96% 6L hi flow      Abn labs:  na: 131, bun: 73, cr: 5.4, wbc: 16.2, hgb: 7.0, plt: 628      Orders:   Zithromax 500mg iv daily   Cefepime 1g iv bid   Accu checks ac/hs with ssi coverage   Zyvox 600mg iv bid   Seizure precautions   Cxr in am   Bmp, cbc daily      Per Nephrology PN:  Assessment and Plan:       - BETTY from ATN //cr 2.7 from baseline of 0.9//received iv contrast on admission so some NGA is also involved//no HN on imaging studies//UA shows no rbcs and only 1 wbcs on admission   - Hyponatremia: resolved//had developed hypernatremia from NS load and it has resolved   - Anion gap metabolic acidosis   - Acute pancreatitis: lipase of 3000   -

## 2020-04-01 NOTE — PROGRESS NOTES
Pulmonary and Critical Care  Progress Note      VITALS:  BP (!) 136/92   Pulse 78   Temp 97.4 °F (36.3 °C) (Oral)   Resp 22   Ht 6' 2\" (1.88 m)   Wt 198 lb 3.1 oz (89.9 kg)   SpO2 98%   BMI 25.45 kg/m²     Subjective:   CHIEF COMPLAINT :Fatigue     HPI:                The patient is a 44 y.o. male with significant past medical history of COPD, HTN, Alcohol abuse quit inn 2012. presents with complaints of Fatigue of 2 days. He is a home less person. He was dehydrated when he presented to ER . He had an episode of unconsciousness responded to CPR. He had BETTY and sepsis. He also had pancreatitis. Couple of days ago he had bilateral pleural effusions which were drained. I was called for suspected pneumonia. CXR done on 03/30/2020 post thoracentesis showed diffuse perhilar opacities. His albumin is 2.5 on 03/23/2020. His EF is 50-55%. His last creatinine is 5.4. His CT chest done on 03/30/2020 showed suspected pneumonia with congestion and acute pancreatitis. At this time he is lying in the bed,. He is in mild resp distress.     Objective:   PHYSICAL EXAM:    LUNGS:Bilateral basal crackles  Abd-soft, BS+,NT  Ext - no pedal edema  CVS-s1s2, no murmurs      DATA:    CBC:  Recent Labs     03/30/20  0600 03/31/20  0434 04/01/20  0450   WBC 17.6* 16.2* 16.8  CHECKS  *   RBC 2.36* 2.33* 2.31*   HGB 7.4* 7.0* 7.0*   HCT 22.3* 22.0* 22.2*   * 628* 675*   MCV 94.5 94.4 96.1   MCH 31.4* 30.0 30.3   MCHC 33.2 31.8* 31.5*   RDW 15.9* 15.9* 15.9*      BMP:  Recent Labs     03/30/20  0400 03/31/20  0434 04/01/20  0450    131* 137   K 4.4 3.9 4.1    99 103   CO2 17* 20* 20*   BUN 90* 73* 65*   CREATININE 5.7* 5.4* 4.8*   CALCIUM 7.7* 7.7* 7.8*   GLUCOSE 125* 94 118*      ABG:  Recent Labs     03/31/20  0845   PH 7.43   PO2ART 54*   NHE8ZFR 31.0*   O2SAT 90.0*     BNP  Lab Results   Component Value Date    BNP 56 11/20/2012      D-Dimer:  Lab Results   Component Value Date    DDIMER 336 (H) 08/20/2015

## 2020-04-01 NOTE — PLAN OF CARE
Problem: Pain:  Goal: Pain level will decrease  Description: Pain level will decrease  Outcome: Ongoing  Goal: Control of acute pain  Description: Control of acute pain  Outcome: Ongoing  Goal: Control of chronic pain  Description: Control of chronic pain  Outcome: Ongoing     Problem: Falls - Risk of:  Goal: Will remain free from falls  Description: Will remain free from falls  Outcome: Ongoing  Goal: Absence of physical injury  Description: Absence of physical injury  Outcome: Ongoing     Problem: Fluid Volume:  Goal: Ability to achieve a balanced intake and output will improve  Description: Ability to achieve a balanced intake and output will improve  Outcome: Ongoing     Problem: Physical Regulation:  Goal: Ability to maintain clinical measurements within normal limits will improve  Description: Ability to maintain clinical measurements within normal limits will improve  Outcome: Ongoing  Goal: Will show no signs and symptoms of electrolyte imbalance  Description: Will show no signs and symptoms of electrolyte imbalance  Outcome: Ongoing     Problem: Discharge Planning:  Goal: Discharged to appropriate level of care  Description: Discharged to appropriate level of care  Outcome: Ongoing     Problem: Fluid Volume - Deficit:  Goal: Absence of fluid volume deficit signs and symptoms  Description: Absence of fluid volume deficit signs and symptoms  Outcome: Ongoing     Problem: Nutrition Deficit:  Goal: Ability to achieve adequate nutritional intake will improve  Description: Ability to achieve adequate nutritional intake will improve  Outcome: Ongoing     Problem: Sleep Pattern Disturbance:  Goal: Appears well-rested  Description: Appears well-rested  Outcome: Ongoing     Problem: Violence - Risk of, Self/Other-Directed:  Goal: Knowledge of developmental care interventions  Description: Absence of violence  Outcome: Ongoing     Problem: Skin Integrity:  Goal: Will show no infection signs and symptoms  Description: Will show no infection signs and symptoms  Outcome: Ongoing  Goal: Absence of new skin breakdown  Description: Absence of new skin breakdown  Outcome: Ongoing     Problem: Breathing Pattern - Ineffective:  Goal: Ability to achieve and maintain a regular respiratory rate will improve  Description: Ability to achieve and maintain a regular respiratory rate will improve  Outcome: Ongoing

## 2020-04-01 NOTE — CONSULTS
[address] [id number] [facility], [address] [facility]                                  CONSULTATION    PATIENT NAME: Allen Mott                      :        1970  MED REC NO:   [id number]                          ROOM:       2019  ACCOUNT NO:   [de-identified]                           ADMIT DATE: 2020  PROVIDER:     Brandy Ojeda MD    CONSULT DATE:  2020    INDICATION:  Chest pain. HISTORY OF PRESENT ILLNESS:  This is a 57-year-old male patient who was  seen in the office. The patient comes to the hospital and the patient has been admitted to  the hospital since 2020. The patient consulted today for chest  pain and shortness of breath present, and tachycardia. He is awake,  alert, answers questions. He has significant diarrhea noted. The  patient was seen by Pulmonary also yesterday. The patient has significant alcohol abuse and smoking and COPD present. He is a homeless person, came in to the hospital with dehydration  present and unconsciousness. He was found to have an episode of  unconsciousness and responded to CPR. He has acute renal injury and  sepsis present. He has also pancreatitis present. The patient has bilateral pleural effusion noted, which were drained. Chest x-ray on 2020 post surgery, showed diffuse perihilar  opacities present. Creatinine was 5.4. CT chest done without contrast  shows suspected pneumonia and possible pancreatitis. Wound Care has  also been consulted. The patient was seen by Dr. America abad and Dr. Mary Hardwick. PAST MEDICAL HISTORY:  History of hypertension, history of COPD, history  of significant alcohol abuse, history of idiopathic seizure disorder and  panic disorder present. PAST SURGICAL HISTORY:  Foot surgery and tonsillectomy. SOCIAL HISTORY:  He smokes, drinks and possible drug use. ALLERGIES:  NKDA.     MEDICATIONS AT HOME:  He was on

## 2020-04-01 NOTE — PROGRESS NOTES
Physical Therapy  Physical Therapy Treatment Note  Name: Miesha Mehta MRN: [id number] :   1970   Date:  2020   Admission Date: 3/16/2020 Room:  -A   Restrictions/Precautions:        fall risk  Communication with other providers:  Hiren Cannon RN states pt is ok to see for therapy,nursing reports pt has been incontinent of stool today   Subjective:  Patient states:  He is ready for ex or getting up in the chair, but give me a minute  Pain:   Location, Type, Intensity (0/10 to 10/10):  Nursing medicating pt for a headache  Objective:    Observation:  Pt was resting in the bed  Treatment, including education/measures:  Transfers, pt needed extra time to complete  Supine to sit : Ind  Scooting :SBA  Sit to stand :CGA to RW  Gait:  Pt amb with RW for 4 ft with CGA and assist for lines  Gait Comments: with VC's' and TC's for B LE placement, walker placement and sequence throughout ambulation; with VC's and TC's to maintain upright posture in order to avoid COM shifting outside of NAUN; with VC's for PLB throughout ambulation  Stand to sit :VC's for hand placements and controled sitting  Sitting Exercises: Ankle pumps x 20  Glute sets x 20  LAQ's x 20  Marching x 20   Pillow squeezes x 20  Hip Abd x 20  Therapeutic Exercise:  Therapeutic exercises were instructed today. Cues were given for technique, safety, recruitment, and rationale. Cues were verbal and/or tactile. Safety  Patient left safely in the chair, with call light/phone in reach. Gait belt was used for transfers.   Assessment / Impression:     Patient's tolerance of treatment:  Good, sat O2 remained 97%, educated pt on importance of movement    Adverse Reaction: none  Significant change in status and impact:  none  Barriers to improvement:  Weakness, neuropathy in both LE's  Plan for Next Session:    Will cont to work towards pt's goals per his tolerance  Time in:  845  Time out: 944  Timed treatment minutes:  59  Total treatment time: 61  Previously filed items:  Social/Functional History  Type of Home: Homeless  ADL Assistance: Independent  Ambulation Assistance: Independent  Transfer Assistance: Independent  Active : No  Mode of Transportation: Walk  Type of occupation: Pt is homeless  Additional Comments: Pt reports no recent falls in last 6 months  Short term goals  Time Frame for Short term goals: 1 week   Short term goal 1: Pt will perform sit><supine Christian   Short term goal 2: Pt will transfer sit><stand Christian   Short term goal 3: Pt will transfer between surfaces Christian   Short term goal 4: Pt will ambulate 100ft with [id number]      Electronically signed by:     Amira Bustillo PTA  4/1/2020, 9:39 AM

## 2020-04-01 NOTE — PROGRESS NOTES
675*       BMP:    Recent Labs     03/30/20  0400 03/31/20  0434 04/01/20  0450    131* 137   K 4.4 3.9 4.1    99 103   CO2 17* 20* 20*   BUN 90* 73* 65*   CREATININE 5.7* 5.4* 4.8*   GLUCOSE 125* 94 118*     Hepatic: No results for input(s): AST, ALT, ALB, BILITOT, ALKPHOS in the last 72 hours. Troponin: No results for input(s): TROPONINI in the last 72 hours. BNP: No results for input(s): BNP in the last 72 hours. Lipids: No results for input(s): CHOL, HDL in the last 72 hours. Invalid input(s): LDLCALCU  ABGs:   Lab Results   Component Value Date    PO2ART 54 03/31/2020    CFF1PYP 31.0 03/31/2020     INR: No results for input(s): INR in the last 72 hours.     Objective:   Vitals: BP (!) 147/93   Pulse 103   Temp 98.5 °F (36.9 °C) (Oral)   Resp (!) 34   Ht 6' 2\" (1.88 m)   Wt 198 lb 3.1 oz (89.9 kg)   SpO2 96%   BMI 25.45 kg/m²   General appearance: alert and cooperative with exam, in no acute distress  HEENT: normocephalic, atraumatic,   Neck: supple, trachea midline  Lungs:  breathing comfortably on nc  Heart[de-identified] tachycardic  Abdomen: soft, non-tender;  Extremities: extremities atraumatic, no cyanosis or edema  Neurologic: alert, oriented, follows commands, interactive    Assessment and Plan:   - BETTY from ATN //cr 2.7 from baseline of 0.9//received iv contrast on admission so some NGA is also involved//no HN on imaging studies//UA shows no rbcs and only 1 wbcs on admission  - Hyponatremia: resolved//had developed hypernatremia from NS load and it has resolved  - Anion gap metabolic acidosis  - Acute pancreatitis: lipase of 3000  - transaminitis  - Hx of alcohol abuse: had supposedly quit, serum alcohol was positive//alcohol withdrawal: resolved  - Ileus from pancreatitis: resolved  - HTN   - COPD  - Anxiety  - hypokalemia: resolved  - BIlateral pleural effusions; s/p tap  - acute hypoxic resp failure from pneumonia     Plan:  - cr down to 4.8 from 5.4 yesterday,   - serum sodium is better

## 2020-04-02 PROBLEM — E43 SEVERE MALNUTRITION (HCC): Chronic | Status: ACTIVE | Noted: 2020-01-01

## 2020-04-02 NOTE — PLAN OF CARE
Problem: Pain:  Goal: Pain level will decrease  Description: Pain level will decrease  Outcome: Ongoing  Goal: Control of acute pain  Description: Control of acute pain  Outcome: Ongoing  Goal: Control of chronic pain  Description: Control of chronic pain  Outcome: Ongoing     Problem: Falls - Risk of:  Goal: Will remain free from falls  Description: Will remain free from falls  Outcome: Ongoing  Goal: Absence of physical injury  Description: Absence of physical injury  Outcome: Ongoing     Problem: Fluid Volume:  Goal: Ability to achieve a balanced intake and output will improve  Description: Ability to achieve a balanced intake and output will improve  Outcome: Ongoing     Problem: Physical Regulation:  Goal: Ability to maintain clinical measurements within normal limits will improve  Description: Ability to maintain clinical measurements within normal limits will improve  Outcome: Ongoing  Goal: Will show no signs and symptoms of electrolyte imbalance  Description: Will show no signs and symptoms of electrolyte imbalance  Outcome: Ongoing     Problem: Discharge Planning:  Goal: Discharged to appropriate level of care  Description: Discharged to appropriate level of care  Outcome: Ongoing     Problem: Fluid Volume - Deficit:  Goal: Absence of fluid volume deficit signs and symptoms  Description: Absence of fluid volume deficit signs and symptoms  Outcome: Ongoing     Problem: Nutrition Deficit:  Goal: Ability to achieve adequate nutritional intake will improve  Description: Ability to achieve adequate nutritional intake will improve  Outcome: Ongoing     Problem: Sleep Pattern Disturbance:  Goal: Appears well-rested  Description: Appears well-rested  Outcome: Ongoing     Problem: Violence - Risk of, Self/Other-Directed:  Goal: Knowledge of developmental care interventions  Description: Absence of violence  Outcome: Ongoing     Problem: Skin Integrity:  Goal: Will show no infection signs and

## 2020-04-02 NOTE — CONSULTS
Infectious Disease Consult Note  2020   Patient Name: Clemente Schaeffer : 1970   Impression   Colie Manual bite on feet   Scaly lesions both hands  o Not infective   Bilateral pneumonia and pleural effusion   EtOH pancreatitis   Multi-morbidity: per PMHx  Plan:   Continue antibiotics as per PCP    Thank you for allowing me to consult in the care of this patient.  ------------------------  REASON FOR CONSULT: Infective syndrome   Requested by: Dr. Gavin Reeder. Winsome Carrero is a 44 y.o.  male with HTN, COPD, ETOH,  depression who has been homeless since 3/1/2020 who was admitted 3/16/2020 for further evaluation and management of one day hx of epigastric pain, non-bloody vomiting. He had a period of restlessness and pulselessness in the ED. And recovered after 15 seconds of CPR. He had lactic acidosisHe received a clinical dx of BETTY, sepsis, pancreatitis. Seen by CTS for frost-bite of both feet. Being treated for bilateral pneumonia and pleural effusion. ? Infectious diseases service was consulted to evaluate the pt, and recommend further investigative and therapeutic measures. ROS: Other systems reviewed Including eyes, ENT, respiratory, cardiovascular, GI, , dermatologic, neurologic, psych, hem/lymphatic, musculoskeletal and endocrine were negative other than what is mentioned above.      Patient Active Problem List    Diagnosis Date Noted    Severe malnutrition ([address] 2020    Acute renal failure (ARF) ([address] 2020    Lumbosacral radiculopathy at S1 2019    BETTY (acute kidney injury) ([address] 2017    Alcohol abuse 2017    Simple chronic bronchitis ([address] 2016    Essential hypertension 2016    Cramps of lower extremity     Alcoholic hepatitis without ascites 10/11/2016    Chronic obstructive pulmonary disease ([address] 10/06/2015    Steatohepatitis 2015    Abnormal LFTs 04/15/2015    Anxiety 10/09/2014    Panic attack      Past Medical frost bite, no clubbing, cyanosis, or edema  Catheter Site: without erythema or tenderness  Neuro: Mental status intact. CN 2-12 intact and no focal sensory or motor deficits    ? Diagnostic Studies: reviewed  ? ? I have examined this patient and available medical records on this date and have made the above observations, conclusions and recommendations.   Electronically signed by: Electronically signed by Disha Lakhani MD on 4/2/2020 at 4:32 PM

## 2020-04-02 NOTE — PROGRESS NOTES
Physical Therapy    Physical Therapy Treatment Note  Name: Sujata Doan MRN: [id number] :   1970   Date:  2020   Admission Date: 3/16/2020 Room:  -A   Restrictions/Precautions:        general precautions, fall risk, portable tele, pulse ox   Communication with other providers: RN, OT  Subjective:  Patient states:  \"You woke me up, you've got to give me time\"   Pain:   Location, Type, Intensity (0/10 to 10/10): Denies   Objective:    Observation:    Supine in bed. Sleeping but easy to arouse. Pt overall required increased time for mobility tasks. Did not like to feel rushed. Treatment, including education/measures:  Supine to sit: SBA with use of bed rail     Sit to stand: CGA for safety from EOB, recliner, and standard toilet   Stand to sit: CGA for safety to recliner(2x) and standard toilet. Impulsive to sit with lack of eccentric control   Step pivot: CGA for safety. Somewhat impulsive with turns and dec awareness to body alignment in relation to seat surface (2x). Ambulation: CGA for safety ~15ft x 2 without AD. Slow pace with timid step. Dec lateral weight shift, short stride, and dec foot clearance. Overall unsteady reaching for external support intermittently to steady self. Uncertain about wanting to use walker. Thinks it \"may help, may not\". Also stated, the walker would get stolen and sold if he brought it to the homeless shelter so does not want to use it. Educated pt on POC, role of PT, HEP (LE strengthening), DME (RW use).    Assessment / Impression:    Patient's tolerance of treatment: good   Adverse Reaction: no  Significant change in status and impact:  no  Barriers to improvement:  Activity tolerance, strength, balance, pain, lack of sensation to LEs   Plan for Next Session:    Activity tolerance ,strength, balance, gait, transfers   Time in:  1424  Time out:  1449  Timed treatment minutes:  25  Total treatment time:25    Previously filed items:  Social/Functional History  Type of Home: Homeless  ADL Assistance: Independent  Ambulation Assistance: Independent  Transfer Assistance: Independent  Active : No  Mode of Transportation: Walk  Type of occupation: Pt is homeless  Additional Comments: Pt reports no recent falls in last 6 months  Short term goals  Time Frame for Short term goals: 1 week   Short term goal 1: Pt will perform sit><supine Christian   Short term goal 2: Pt will transfer sit><stand Christian   Short term goal 3: Pt will transfer between surfaces Christian   Short term goal 4: Pt will ambulate 100ft with [id number]        Electronically signed by:    Hugh Goyal, [id number]  4/2/2020, 2:53 PM

## 2020-04-02 NOTE — PROGRESS NOTES
replaced MG  - start lopressor, increased to 100 BID  - check echo: ef 50-55%  - monitor     Mod B/L Pleural Effusions  - s/p IR thoracentesis 3/26: 450cc from right side, 900cc from left side  - consult IR for repeat thoracentesis 3/30: 600cc from right side and 1175cc from left removed     Anemia, r/o GI bleed  - +FOBT now  - consult GI  - check iron panel: low, give IV venofer x 3 doses  - transfuse if <7.0  - monitor CBC  - d/c heparin for now     Acute Resp Failure likely 2/2 HAP  - clinically improving NC O2 down to 3L from 6   - start IV Zyvox + Cefepime + Zithromax  - check CXR: + b/l airspace disease vs effusions  - check CT chest: large b/l effusions noted, peribronchovascular consolidation and ground glass opacities noted  - check urine strep, legionella: neg  - check resp cx  - check resp pcr: neg  - check blood cx NTD  - consult pulm     Chest Pain likely musculoskeletal from chest compressions, he is very tender at sternum  -stable  - d/w Radiologist and unable to do v/q at this time due to hazy CXR and PNA, stated that results would only be indeterminant at best even if attempted, since his symptoms improved will hold off now.   - get trops x2  - get EKG  - venous duplex neg b/l  - consult cardio        Chronic  - COPD- Not in exacerbation; cont home spiriva, albuterol  - Neuropathy- hold neurontin  - Depression- Cont paxil     SCD for dvt ppx            Diet DIET GENERAL; Low Potassium  Dietary Nutrition Supplements: Low Calorie High Protein Supplement   Code Status Full Code     Medications:   Medications:    linezolid  600 mg Intravenous Q12H    cefepime  1 g Intravenous Q12H    azithromycin  500 mg Intravenous Q24H    metoprolol tartrate  100 mg Oral BID    hydrALAZINE  50 mg Oral 3 times per day    sodium chloride flush  10 mL Intravenous 2 times per day    nicotine  1 patch Transdermal Daily    sodium chloride flush  10 mL Intravenous 2 times per day    folic acid  1 mg Oral Daily    thiamine  100 mg Oral Daily    sodium chloride flush  10 mL Intravenous 2 times per day    PARoxetine  20 mg Oral Daily    tiotropium  2 puff Inhalation Daily    pantoprazole  40 mg Intravenous Daily      Infusions:    dextrose       PRN Meds: LORazepam, 1 mg, Q8H PRN  glucose, 15 g, PRN  dextrose, 12.5 g, PRN  glucagon (rDNA), 1 mg, PRN  dextrose, 100 mL/hr, PRN  traMADol, 50 mg, Q6H PRN  acetaminophen, 650 mg, Q4H PRN  sodium chloride flush, 10 mL, PRN  albuterol, 2.5 mg, Q6H PRN  benzocaine, , Q1H PRN  nicotine polacrilex, 4 mg, PRN  potassium chloride, 10 mEq, PRN  magnesium sulfate, 1 g, PRN  sodium chloride flush, 10 mL, PRN  sodium chloride flush, 10 mL, PRN  polyethylene glycol, 17 g, Daily PRN  ondansetron, 4 mg, Q8H PRN    Or  ondansetron, 4 mg, Q6H PRN      Subjective:     Doing better, no distress  Objective: Intake/Output Summary (Last 24 hours) at 4/2/2020 1004  Last data filed at 4/2/2020 0456  Gross per 24 hour   Intake 60 ml   Output 3275 ml   Net -3215 ml      Vitals:   Vitals:    04/02/20 0845   BP: (!) 150/92   Pulse: 96   Resp:    Temp:    SpO2:      Physical Exam:   Gen:  awake, alert, cooperative, no apparent distress  Head/Eyes:  Normocephalic atraumatic, EOMI   NECK:   symmetrical, trachea midline  LUNGS: Normal Effort   CARDIOVASCULAR:  Normal rate  ABDOMEN: Non tender, non distended, no HSM noted. MUSCULOSKELETAL:  +repoducible chest pain on sternal palpation  NEUROLOGIC: Alert and Oriented,  Cranial nerves II-XII are grossly intact.    SKIN:  no bruising or bleeding, normal skin color,  no redness      Data:       CBC   Recent Labs     03/31/20  0434 04/01/20  0450 04/02/20  0625   WBC 16.2* 16.8  CHECKS  * 14.2*   HGB 7.0* 7.0* 7.0*   HCT 22.0* 22.2* 21.7*   * 675* 714*      BMP   Recent Labs     04/01/20  0450 04/02/20  0604 04/02/20  0917    151* 134*   K 4.1 4.1 3.6    118* 99   CO2 20* 26 25   BUN 65* 24* 51*   CREATININE 4.8* 0.7* 3.8* Electronically signed by Marimar Tate MD on 4/2/2020 at 10:04 AM

## 2020-04-02 NOTE — PROGRESS NOTES
Physical Therapy  Attempted a.m and p.m to work with pt for therapy. Pt requesting to wait until the afternoon. When checked on in the afternoon, he was sleeping and requested therapy to come back later. Will continue to follow up and see as time permits and when pt willing to participate.

## 2020-04-02 NOTE — PROGRESS NOTES
675* 714*       BMP:    Recent Labs     03/31/20  0434 04/01/20  0450 04/02/20  0604   * 137 151*   K 3.9 4.1 4.1   CL 99 103 118*   CO2 20* 20* 26   BUN 73* 65* 24*   CREATININE 5.4* 4.8* 0.7*   GLUCOSE 94 118* 110*     Hepatic: No results for input(s): AST, ALT, ALB, BILITOT, ALKPHOS in the last 72 hours. Troponin: No results for input(s): TROPONINI in the last 72 hours. BNP: No results for input(s): BNP in the last 72 hours. Lipids: No results for input(s): CHOL, HDL in the last 72 hours. Invalid input(s): LDLCALCU  ABGs:   Lab Results   Component Value Date    PO2ART 54 03/31/2020    MOJ3HVE 31.0 03/31/2020     INR:   Recent Labs     04/01/20  1330   INR 2.51       Objective:   Vitals: BP (!) 157/97   Pulse 92   Temp 98.1 °F (36.7 °C) (Oral)   Resp 27   Ht 6' 2\" (1.88 m)   Wt 205 lb 14.6 oz (93.4 kg)   SpO2 100%   BMI 26.44 kg/m²   General appearance: alert and cooperative with exam, in no acute distress  HEENT: normocephalic, atraumatic,   Neck: supple, trachea midline  Lungs: , breathing comfortably =  Heart[de-identified] regular rate and rhythm, S1, S2 normal,  Abdomen: soft, tender; non distended,   Extremities: no leg swelling  Neurologic: alert, oriented, follows commands, interactive    Assessment and Plan:     - BETTY from ATN //cr 2.7 from baseline of 0.9//received iv contrast on admission so some NGA is also involved//no HN on imaging studies//UA shows no rbcs and only 1 wbcs on admission  - Hyponatremia: resolved//had developed hypernatremia from NS load and it has resolved  - Anion gap metabolic acidosis  - Acute pancreatitis: lipase of 3000  - transaminitis  - Hx of alcohol abuse: had supposedly quit, serum alcohol was positive//alcohol withdrawal: resolved  - Ileus from pancreatitis: resolved  - HTN   - COPD  - Anxiety  - hypokalemia: resolved  - BIlateral pleural effusions; s/p tap  - acute hypoxic resp failure from pneumonia     Plan:  - cr down to 0.7 today ?????  From 4.8 from 5.4

## 2020-04-02 NOTE — PROGRESS NOTES
Session:    Cont POC addressing goals:    Goals:  Pt goal: go home  Time Frame for STGs: discharge  Goal 1: Pt will perform UE ADLs Independent  Goal 2: Pt will perform LE ADLs Christian w/ AD  Goal 3: Pt will perform toileting Christian  Goal 4: Pt will perform functional transfer w/ AD Christian  Goal 5: Pt will perform functional mobility w/ AD Christian  Goal 6: Pt will perform therex/theract in order to increase functional activity tolerance and dynamic standing balance    Safety: Left in chair  with all needs in reach. Gait belt used for transfer and mobility. Time in:  1425  Time out:  1450  Timed treatment minutes:  25  Total treatment time:  25    Electronically signed by:     [id number], [place], [place]   [id number]   3:04 PM, 4/2/2020      Previously filed values:

## 2020-04-03 NOTE — PROGRESS NOTES
GENERAL SURGERY PROGRESS NOTE    Lucas Faria is a 44 y.o. male with  severe acute pancreatitis, resolving. Also with anemia found to have positive occult blood in the stool. Subjective: Tolerating regular diet. Continues to have bowel function, loose stools. Distention improving. Knox out and making urine. Denies seeing any jami blood or melena in the stool.      Objective:    Vitals: VITALS:  BP (!) 145/90   Pulse 88   Temp 97.6 °F (36.4 °C) (Oral)   Resp 23   Ht 6' 2\" (1.88 m)   Wt 207 lb (93.9 kg)   SpO2 95%   BMI 26.58 kg/m²     I/O: 04/02 0701 - 04/03 0700  In: 1070 [P.O.:720]  Out: 2350 [Urine:2350]    Labs/Imaging Results:     Lab Results   Component Value Date    WBC 13.6 (H) 04/03/2020    HGB (LL) 04/03/2020     6.8  HGB CALLED TO SARAHY ACHARYA RN 4/3 0642 BY PAT FERRO   RESULTS READ BACK      HCT 21.6 (L) 04/03/2020    MCV 96.4 04/03/2020     (H) 04/03/2020     Lab Results   Component Value Date     04/03/2020    K 3.9 04/03/2020     04/03/2020    CO2 27 04/03/2020    BUN 43 04/03/2020    CREATININE 3.3 04/03/2020    GLUCOSE 98 04/03/2020    CALCIUM 7.4 04/03/2020        IV Fluids: dextrose    Scheduled Meds:   linezolid, 600 mg, Intravenous, Q12H    cefepime, 1 g, Intravenous, Q12H    azithromycin, 500 mg, Intravenous, Q24H    metoprolol tartrate, 100 mg, Oral, BID    hydrALAZINE, 50 mg, Oral, 3 times per day    sodium chloride flush, 10 mL, Intravenous, 2 times per day    nicotine, 1 patch, Transdermal, Daily    sodium chloride flush, 10 mL, Intravenous, 2 times per day    folic acid, 1 mg, Oral, Daily    thiamine, 100 mg, Oral, Daily    sodium chloride flush, 10 mL, Intravenous, 2 times per day    PARoxetine, 20 mg, Oral, Daily    tiotropium, 2 puff, Inhalation, Daily    pantoprazole, 40 mg, Intravenous, Daily    Physical Exam:  General: Answers questions appropriately, in no acute distress  Respiratory: normal effort, no wheezes

## 2020-04-03 NOTE — DISCHARGE INSTR - COC
 Alcohol abuse F10.10    Lumbosacral radiculopathy at S1 M54.17    Acute renal failure (ARF) (HCC) N17.9    Severe malnutrition (HCC) E43       Isolation/Infection:   Isolation          No Isolation        Patient Infection Status     Infection Onset Added Last Indicated Last Indicated By Review Planned Expiration Resolved Resolved By    C-diff (Clostridium difficile) 04/02/20 04/03/20 04/02/20 C difficile Molecular/PCR 04/10/20       Resolved    C-diff Rule Out 04/02/20 04/02/20 04/02/20 C difficile Molecular/PCR (Ordered)   04/03/20 Rule-Out Test Resulted          Nurse Assessment:  Last Vital Signs: BP (!) 140/90   Pulse 88   Temp 98 °F (36.7 °C) (Oral)   Resp 23   Ht 6' 2\" (1.88 m)   Wt 195 lb 8.8 oz (88.7 kg)   SpO2 95%   BMI 25.11 kg/m²     Last documented pain score (0-10 scale): Pain Level: 6  Last Weight:   Wt Readings from Last 1 Encounters:   04/03/20 195 lb 8.8 oz (88.7 kg)     Mental Status:  oriented    IV Access:  - None    Nursing Mobility/ADLs:  Walking   Independent  Transfer  Independent  Bathing  Assisted  Dressing  Independent  Toileting  Independent  Feeding  Independent  Med Admin  Assisted  Med Delivery   whole    Wound Care Documentation and Therapy:  Wound Toe (Comment  which one) Anterior;Right;Posterior;Medial;Upper all toes and upper top and bottom of foot has areas of being dusky and purple with scattered black areas (Active)   Dressing/Treatment Open to air 4/3/2020  2:05 PM   Wound Assessment Black;Pink;Purple 4/3/2020  2:05 PM   Drainage Amount None 4/3/2020  2:05 PM   Odor None 4/3/2020  2:05 PM   Number of days:        Wound Toe (Comment  which one) Anterior;Left;Posterior; Upper all toes and upper top and bottom of foot has areas of being dusky and purple with scattered black area (Active)   Dressing/Treatment Open to air 4/3/2020  2:05 PM   Wound Assessment Purple;Black; Pink 4/3/2020  2:05 PM   Drainage Amount None 4/3/2020  2:05 PM   Odor None 4/3/2020  2:05 PM

## 2020-04-03 NOTE — PROGRESS NOTES
Hospitalist Progress Note      Name:  Pedro Mejía /Age/Sex: 1970  (44 y.o. male)   MRN & CSN:  [id number] & [id number] Admission Date/Time: 3/16/2020  5:52 PM   Location:  -A PCP: Mitch Ballard MD       Pedro Mejía is a 44 y.o.  male  who presents with Shortness of Breath;  Chest Pain (left sternal, constant 10/10 burning chest pain.); and Fatigue      Assessment and Plan:   Acute pancreatitis  - clinically improved   - on diet now  - check ct abd/pelvis: acute pancreatitis with no necrosis or pseudocyst  - h/o alcohol, RUQ US with sludge no cholelithiasis     Ileus , partial bowel obstruction.   - improved  - TPN d/c  - NGT out  - tolerating diet      Alcohol dependence with withdrawal , DT  -resolved  - oral, thiamine, folic acid  - No s/sx of withdrawal anymore  - CIWA with ativan prn.   - 3/21 Started on precedex IV, off now     Acute renal failure,  (baseline 0.9)  - improving  - IVF NS d/c  - d/c marina now  - Consulted Nephro; holding ACEi  - d/c neurontin  - d/c Vanc     Mild Hyponatremia  - monitor     Early signs of frostbite  Peripheral vascular disease  -stable  - bilateral toes discoloration and impaired sensation  - concern about frost bite injury, arterial doppler with no major abnormality  - consulted surgery, vascular surgery  - conservative management. Neurontin, Oxycodone prn pain     Transient AMS/unresponsiveness  - resolved  - In ED while not on telemetry- unconfirmed pulselessness   - No defibrillation/ACLS meds given; CPR done for 10 - 15 seconds  - CT head without acute process; CTA chest- no PE  - Immediately AAOX4 and on room air after episode  - 3/21 having DT's, started on precedex  - 3/23 improved mental status     Essential HTN  -stable  - hydralazine + metoprolol     Hyperglycemia  - improved, likely from D5 infusion   - No known hx of DM, A1c 5.0     Homeless  - Consulted to  for d/c planning needs      HypoMG  - replaced     Sinus Tachy  -stable  - day    nicotine  1 patch Transdermal Daily    sodium chloride flush  10 mL Intravenous 2 times per day    folic acid  1 mg Oral Daily    thiamine  100 mg Oral Daily    sodium chloride flush  10 mL Intravenous 2 times per day    PARoxetine  20 mg Oral Daily    tiotropium  2 puff Inhalation Daily    pantoprazole  40 mg Intravenous Daily      Infusions:    dextrose       PRN Meds: LORazepam, 1 mg, Q8H PRN  glucose, 15 g, PRN  dextrose, 12.5 g, PRN  glucagon (rDNA), 1 mg, PRN  dextrose, 100 mL/hr, PRN  traMADol, 50 mg, Q6H PRN  acetaminophen, 650 mg, Q4H PRN  sodium chloride flush, 10 mL, PRN  albuterol, 2.5 mg, Q6H PRN  benzocaine, , Q1H PRN  nicotine polacrilex, 4 mg, PRN  potassium chloride, 10 mEq, PRN  magnesium sulfate, 1 g, PRN  sodium chloride flush, 10 mL, PRN  sodium chloride flush, 10 mL, PRN  polyethylene glycol, 17 g, Daily PRN  ondansetron, 4 mg, Q8H PRN    Or  ondansetron, 4 mg, Q6H PRN      Subjective:     Doing well, no distress  Objective: Intake/Output Summary (Last 24 hours) at 4/3/2020 1103  Last data filed at 4/3/2020 0649  Gross per 24 hour   Intake 830 ml   Output 1400 ml   Net -570 ml      Vitals:   Vitals:    04/03/20 1100   BP: (!) 140/90   Pulse:    Resp:    Temp: 98 °F (36.7 °C)   SpO2:      Physical Exam:   Gen:  awake, alert, cooperative, no apparent distress  Head/Eyes:  Normocephalic atraumatic, EOMI   NECK:   symmetrical, trachea midline  LUNGS: Normal Effort   CARDIOVASCULAR:  Normal rate  ABDOMEN: Non tender, non distended, no HSM noted. MUSCULOSKELETAL:  ROM WNL  NEUROLOGIC: Alert and Oriented,  Cranial nerves II-XII are grossly intact.    SKIN:  no bruising or bleeding, normal skin color,  no redness      Data:       CBC   Recent Labs     04/01/20  0450 04/02/20  0625 04/03/20  0610   WBC 16.8  CHECKS  * 14.2* 13.6*   HGB 7.0* 7.0* 6.8  HGB CALLED TO SARAHY ACHARYA RN 4/3 0642 BY PAT FERRO   RESULTS READ BACK  *   HCT 22.2* 21.7* 21.6*   * 714* 670*      BMP

## 2020-04-03 NOTE — PROGRESS NOTES
Pulmonary and Critical Care  Progress Note      VITALS:  BP (!) 140/90   Pulse 88   Temp 98 °F (36.7 °C) (Oral)   Resp 23   Ht 6' 2\" (1.88 m)   Wt 195 lb 8.8 oz (88.7 kg)   SpO2 95%   BMI 25.11 kg/m²     Subjective:   CHIEF COMPLAINT :Fatigue     HPI:                The patient is a 44 y.o. male with significant past medical history of COPD, HTN, Alcohol abuse quit inn 2012.   presents with complaints of Fatigue of 2 days. He is a home less person. He was dehydrated when he presented to ER . He had an episode of unconsciousness responded to CPR. He had BETTY and sepsis. He also had pancreatitis. Couple of days ago he had bilateral pleural effusions which were drained. I was called for suspected pneumonia. CXR done on 03/30/2020 post thoracentesis showed diffuse perhilar opacities. His albumin is 2.5 on 03/23/2020. His EF is 50-55%. His last creatinine is 5.4. His CT chest done on 03/30/2020 showed suspected pneumonia with congestion and acute pancreatitis. At this time he is lying in the bed,. He is in mild resp distress.     Objective:   PHYSICAL EXAM:    LUNGS:Occasional basal crackles  Abd-soft, BS+,NT  Ext- no pedal edema  CVS-s1s2, no murmurs      DATA:    CBC:  Recent Labs     04/01/20  0450 04/02/20  0625 04/03/20  0610   WBC 16.8  CHECKS  * 14.2* 13.6*   RBC 2.31* 2.30* 2.24*   HGB 7.0* 7.0* 6.8  HGB CALLED TO SARAHY ACHARYA RN 4/3 0642 BY PAT FERRO   RESULTS READ BACK  *   HCT 22.2* 21.7* 21.6*   * 714* 670*   MCV 96.1 94.3 96.4   MCH 30.3 30.4 30.4   MCHC 31.5* 32.3 31.5*   RDW 15.9* 15.9* 15.9*      BMP:  Recent Labs     04/02/20  0604 04/02/20  0917 04/03/20  0610   NA QUESTIONABLE RESULTS, TEST CREDITED AND PT REDRAWN  CORRECTED ON 04/02 AT 1125: PREVIOUSLY REPORTED AS: 151 134* 139   K QUESTIONABLE RESULTS, TEST CREDITED AND PT REDRAWN, CALLED INDU RODRIGUEZ RN  CORRECTED ON 04/02 AT 1125: PREVIOUSLY REPORTED AS: 4.1 3.6 3.9   CL QUESTIONABLE RESULTS, TEST CREDITED AND PT REDRAWN  CORRECTED optimization  8. C/w present management  9. Keep sats > 92%  10. Check INR/PT & PTT now  No follow-ups on file.     Electronically signed by Arik Reyna MD on 4/3/2020 at 11:39 AM

## 2020-04-03 NOTE — PROGRESS NOTES
Nephrology Progress Note        [address], [address] [address]  Phone: [phone]  Office Hours: 8:30AM - 4:30PM  Monday - Friday       [facility]  MD Colette Monteiro Sender, DO Sage Fan,  Jaun Cordero  [place] [facility] [address]  PHONE: [phone]  FAX: [phone]    4/3/2020 7:08 AM  Subjective:   Admit Date: 3/16/2020  PCP: Rusty Oakes MD  Interval History: doing ok on nc  Knox cath is out, reports urinating easily    Diet: DIET GENERAL; Low Potassium  Dietary Nutrition Supplements: Low Calorie High Protein Supplement      Data:   Scheduled Meds:   linezolid  600 mg Intravenous Q12H    cefepime  1 g Intravenous Q12H    azithromycin  500 mg Intravenous Q24H    metoprolol tartrate  100 mg Oral BID    hydrALAZINE  50 mg Oral 3 times per day    sodium chloride flush  10 mL Intravenous 2 times per day    nicotine  1 patch Transdermal Daily    sodium chloride flush  10 mL Intravenous 2 times per day    folic acid  1 mg Oral Daily    thiamine  100 mg Oral Daily    sodium chloride flush  10 mL Intravenous 2 times per day    PARoxetine  20 mg Oral Daily    tiotropium  2 puff Inhalation Daily    pantoprazole  40 mg Intravenous Daily     Continuous Infusions:   dextrose       PRN Meds:LORazepam, glucose, dextrose, glucagon (rDNA), dextrose, traMADol, acetaminophen, sodium chloride flush, albuterol, benzocaine, nicotine polacrilex, potassium chloride, magnesium sulfate, sodium chloride flush, sodium chloride flush, polyethylene glycol, ondansetron **OR** ondansetron  I/O last 3 completed shifts: In: 3481 [P.O.:720; IV Piggyback:350]  Out: 2350 [Urine:2350]  No intake/output data recorded.     Intake/Output Summary (Last 24 hours) at 4/3/2020 0708  Last data filed at 4/3/2020 0649  Gross per 24 hour   Intake 1070 ml   Output 2350 ml   Net -1280 ml       CBC:   Recent Labs     04/01/20  0450 04/02/20  0625 04/03/20  0610   WBC

## 2020-04-03 NOTE — PLAN OF CARE
Problem: Pain:  Goal: Pain level will decrease  Description: Pain level will decrease  4/3/2020 0408 by Basim Whitman RN  Outcome: Ongoing  4/2/2020 1540 by Kingston Severe, RN  Outcome: Ongoing  4/2/2020 1535 by Esteban Millan RN  Outcome: Ongoing  Goal: Control of acute pain  Description: Control of acute pain  4/3/2020 0408 by Basim Whitman RN  Outcome: Ongoing  4/2/2020 1540 by Kingston Severe, RN  Outcome: Ongoing  4/2/2020 1535 by Esteban Millan RN  Outcome: Ongoing  Goal: Control of chronic pain  Description: Control of chronic pain  4/3/2020 0408 by Basim Whitman RN  Outcome: Ongoing  4/2/2020 1540 by Kingston Severe, RN  Outcome: Ongoing  4/2/2020 1535 by Esteban Millan RN  Outcome: Ongoing

## 2020-04-04 NOTE — PLAN OF CARE
Problem: Pain:  Description: Pain management should include both nonpharmacologic and pharmacologic interventions.   Goal: Pain level will decrease  Description: Pain level will decrease  Outcome: Ongoing  Goal: Control of acute pain  Description: Control of acute pain  Outcome: Ongoing  Goal: Control of chronic pain  Description: Control of chronic pain  Outcome: Ongoing     Problem: Falls - Risk of:  Goal: Will remain free from falls  Description: Will remain free from falls  Outcome: Ongoing  Goal: Absence of physical injury  Description: Absence of physical injury  Outcome: Ongoing     Problem: Fluid Volume:  Goal: Ability to achieve a balanced intake and output will improve  Description: Ability to achieve a balanced intake and output will improve  Outcome: Ongoing     Problem: Physical Regulation:  Goal: Ability to maintain clinical measurements within normal limits will improve  Description: Ability to maintain clinical measurements within normal limits will improve  Outcome: Ongoing  Goal: Will show no signs and symptoms of electrolyte imbalance  Description: Will show no signs and symptoms of electrolyte imbalance  Outcome: Ongoing     Problem: Discharge Planning:  Goal: Discharged to appropriate level of care  Description: Discharged to appropriate level of care  Outcome: Ongoing     Problem: Fluid Volume - Deficit:  Goal: Absence of fluid volume deficit signs and symptoms  Description: Absence of fluid volume deficit signs and symptoms  Outcome: Ongoing     Problem: Nutrition Deficit:  Goal: Ability to achieve adequate nutritional intake will improve  Description: Ability to achieve adequate nutritional intake will improve  Outcome: Ongoing     Problem: Sleep Pattern Disturbance:  Goal: Appears well-rested  Description: Appears well-rested  Outcome: Ongoing     Problem: Violence - Risk of, Self/Other-Directed:  Goal: Knowledge of developmental care interventions  Description: Absence of

## 2020-04-04 NOTE — PROGRESS NOTES
Pulmonary and Critical Care  Progress Note      VITALS:  BP (!) 146/90   Pulse 93   Temp 98 °F (36.7 °C) (Oral)   Resp 20   Ht 6' 2\" (1.88 m)   Wt 196 lb 10.4 oz (89.2 kg)   SpO2 91%   BMI 25.25 kg/m²     Subjective:   CHIEF COMPLAINT :Fatigue     HPI:                The patient is a 44 y.o. male with significant past medical history of COPD, HTN, Alcohol abuse quit inn 2012.   presents with complaints of Fatigue of 2 days. He is a home less person. He was dehydrated when he presented to ER . He had an episode of unconsciousness responded to CPR. He had BETTY and sepsis. He also had pancreatitis. Couple of days ago he had bilateral pleural effusions which were drained. I was called for suspected pneumonia. CXR done on 03/30/2020 post thoracentesis showed diffuse perhilar opacities. His albumin is 2.5 on 03/23/2020. His EF is 50-55%. His last creatinine is 5.4. His CT chest done on 03/30/2020 showed suspected pneumonia with congestion and acute pancreatitis. At this time he is lying in the bed,. He is in mild resp distress. Objective:   PHYSICAL EXAM:    LUNGS:Occasional basal crackles  Abd-soft, BS+, NT  Ext - no pedal edema  CVS-s1s2, no murmurs      DATA:    CBC:  Recent Labs     04/02/20  0625 04/03/20  0610 04/04/20  0100 04/04/20  0542   WBC 14.2* 13.6*  --  15.1*   RBC 2.30* 2.24*  --  2.68*   HGB 7.0* 6.8  HGB CALLED TO SARAHY ACHARYARN 4/3 0642 BY PAT FERRO   RESULTS READ BACK  * 8.1* 8.2*   HCT 21.7* 21.6* 25.6* 25.5*   * 670*  --  647*   MCV 94.3 96.4  --  95.1   MCH 30.4 30.4  --  30.6   MCHC 32.3 31.5*  --  32.2   RDW 15.9* 15.9*  --  15.9*      BMP:  Recent Labs     04/02/20  0917 04/03/20  0610 04/04/20  0542   * 139 138   K 3.6 3.9 3.7   CL 99 101 101   CO2 25 27 25   BUN 51* 43* 36*   CREATININE 3.8* 3.3* 2.7*   CALCIUM 7.4* 7.4* 7.7*   GLUCOSE 113* 98 144*      ABG:  No results for input(s): PH, PO2ART, TQE6RXQ, HCO3, BEART, O2SAT in the last 72 hours.   BNP  Lab Results Component Value Date    BNP 56 11/20/2012      D-Dimer:  Lab Results   Component Value Date    DDIMER 1015 (H) 04/01/2020      1. Radiology: None      Assessment/Plan     Patient Active Problem List    Diagnosis Date Noted    Severe malnutrition ([facility] 04/02/2020    [facility] failure [facility]ARF) ([facility] 03/16/2020    Lumbosacral radiculopathy at S1 05/22/2019    BETTY (acute kidney injury) ([facility] 05/03/2017    Alcohol abuse 05/03/2017    Simple chronic bronchitis ([facility] 12/21/2016    Essential hypertension 12/14/2016    Cramps of lower extremity 30/67/5787    Alcoholic hepatitis without ascites 10/11/2016    Chronic obstructive pulmonary disease ([facility] 10/06/2015    Steatohepatitis 04/27/2015    Abnormal LFTs 04/15/2015    Anxiety 10/09/2014    Panic attack    Patient is lying in the bed. He is in mild resp distress    BETTY - improving  Anemia-stable  Leukocytosis- improving  Coagulopathy- improved  Bilateral pneumonia  Bilateral pleural effusions L > R s/p thoracentesis  Recent pancreatitis  Acute hypoxic resp failure - slowly improving  Lactic acidosis - resolved  H/o Alcohol abuse   severe malnutrition      1. Abx  2. F/u C&s  3. Nebs  4. Nutritional optimization  5. ICs  6. OOB  7. Await placement  8. C/w present management  No follow-ups on file.     Electronically signed by Clemente Watts MD on 4/4/2020 at 11:02 AM

## 2020-04-05 NOTE — PROGRESS NOTES
Hospitalist Progress Note      Name:  Rosales Bryan /Age/Sex: 1970  (44 y.o. male)   MRN & CSN:  [id number] & [id number] Admission Date/Time: 3/16/2020  5:52 PM   Location:  -A PCP: Woody Best MD       Rosales Bryan is a 44 y.o.  male  who presents with Shortness of Breath;  Chest Pain (left sternal, constant 10/10 burning chest pain.); and Fatigue      Assessment and Plan:   Acute pancreatitis  - resolved  - on diet now  - check ct abd/pelvis: acute pancreatitis with no necrosis or pseudocyst  - h/o alcohol, RUQ US with sludge no cholelithiasis     Ileus , partial bowel obstruction.   - resolved  - TPN d/c  - NGT out  - tolerating diet      Alcohol dependence with withdrawal , DT  -resolved  - oral, thiamine, folic acid  - No s/sx of withdrawal anymore  - CIWA with ativan prn.   - 3/21 Started on precedex IV, off now     Acute renal failure,  (baseline 0.9)  - improving  - IVF NS d/c  - d/c marina   - Consulted Nephro; holding ACEi  - d/c neurontin  - d/c Vanc     Mild Hyponatremia  - resolved      Early signs of frostbite  Peripheral vascular disease  -stable  - bilateral toes discoloration and impaired sensation  - concern about frost bite injury, arterial doppler with no major abnormality  - consulted surgery, vascular surgery  - conservative management. Neurontin, Oxycodone prn pain     Transient AMS/unresponsiveness  - resolved  - In ED while not on telemetry- unconfirmed pulselessness   - No defibrillation/ACLS meds given; CPR done for 10 - 15 seconds  - CT head without acute process; CTA chest- no PE  - Immediately AAOX4 and on room air after episode  - 3/21 having DT's, started on precedex  - 3/23 improved mental status     Essential HTN  -stable  - hydralazine + metoprolol     Hyperglycemia  - resolved, likely from D5 infusion   - No known hx of DM, A1c 5.0     Homeless  - Consulted to  for d/c planning needs      HypoMG  - replaced     Sinus Tachy  -stable  - replaced MG  - start

## 2020-04-05 NOTE — PROGRESS NOTES
Pulmonary and Critical Care  Progress Note      VITALS:  BP (!) 154/93   Pulse 92   Temp 98.3 °F (36.8 °C) (Oral)   Resp 29   Ht 6' 2\" (1.88 m)   Wt 192 lb 14.4 oz (87.5 kg)   SpO2 95%   BMI 24.77 kg/m²     Subjective:   CHIEF COMPLAINT :Fatigue     HPI:                The patient is a 44 y.o. male with significant past medical history of COPD, HTN, Alcohol abuse quit inn 2012.   presents with complaints of Fatigue of 2 days. He is a home less person. He was dehydrated when he presented to ER . He had an episode of unconsciousness responded to CPR. He had BETTY and sepsis. He also had pancreatitis. Couple of days ago he had bilateral pleural effusions which were drained. I was called for suspected pneumonia. CXR done on 03/30/2020 post thoracentesis showed diffuse perhilar opacities. His albumin is 2.5 on 03/23/2020. His EF is 50-55%. His last creatinine is 5.4. His CT chest done on 03/30/2020 showed suspected pneumonia with congestion and acute pancreatitis. At this time he is lying in the bed,. He is in mild resp distress. Objective:   PHYSICAL EXAM:    LUNGS:Occasional basal crackles  Abd-soft, BS+,NT  Ext - no pedal edema  CVS-s1s2, no murmurs      DATA:    CBC:  Recent Labs     04/03/20  0610 04/04/20  0100 04/04/20 0542 04/05/20  0600   WBC 13.6*  --  15.1* 13.7*   RBC 2.24*  --  2.68* 2.70*   HGB 6.8  HGB CALLED TO SARAHY ACHARYARN 4/3 0642 BY PAT FERRO   RESULTS READ BACK  * 8.1* 8.2* 8.2*   HCT 21.6* 25.6* 25.5* 26.2*   *  --  647* 622*   MCV 96.4  --  95.1 97.0   MCH 30.4  --  30.6 30.4   MCHC 31.5*  --  32.2 31.3*   RDW 15.9*  --  15.9* 15.9*      BMP:  Recent Labs     04/03/20  0610 04/04/20  0542 04/05/20  0600    138 139   K 3.9 3.7 3.8    101 100   CO2 27 25 23   BUN 43* 36* 28*   CREATININE 3.3* 2.7* 2.2*   CALCIUM 7.4* 7.7* 7.8*   GLUCOSE 98 144* 88      ABG:  No results for input(s): PH, PO2ART, ULA4IBX, HCO3, BEART, O2SAT in the last 72 hours.   BNP  Lab Results

## 2020-04-06 NOTE — PROGRESS NOTES
Pulmonary and Critical Care  Progress Note      VITALS:  BP (!) 172/98   Pulse 90   Temp 97.8 °F (36.6 °C) (Oral)   Resp 19   Ht 6' 2\" (1.88 m)   Wt 184 lb 4.9 oz (83.6 kg)   SpO2 95%   BMI 23.66 kg/m²     Subjective:   CHIEF COMPLAINT :Fatigue     HPI:                The patient is a 44 y.o. male with significant past medical history of COPD, HTN, Alcohol abuse quit inn 2012.   presents with complaints of Fatigue of 2 days. He is a home less person. He was dehydrated when he presented to ER . He had an episode of unconsciousness responded to CPR. He had BETTY and sepsis. He also had pancreatitis. Couple of days ago he had bilateral pleural effusions which were drained. I was called for suspected pneumonia. CXR done on 03/30/2020 post thoracentesis showed diffuse perhilar opacities. His albumin is 2.5 on 03/23/2020. His EF is 50-55%. His last creatinine is 5.4. His CT chest done on 03/30/2020 showed suspected pneumonia with congestion and acute pancreatitis. At this time he is lying in the bed,. He is in mild resp distress. Objective:   PHYSICAL EXAM:    LUNGS:Occasional basal crackles  Abd-soft, BS+,NT  Ext - no pedal edema  CVS-s1s2, no murmurs      DATA:    CBC:  Recent Labs     04/04/20  0542 04/05/20  0600 04/06/20  0620   WBC 15.1* 13.7* 13.6*   RBC 2.68* 2.70* 2.66*   HGB 8.2* 8.2* 8.1*   HCT 25.5* 26.2* 25.4*   * 622* 541*   MCV 95.1 97.0 95.5   MCH 30.6 30.4 30.5   MCHC 32.2 31.3* 31.9*   RDW 15.9* 15.9* 15.6*      BMP:  Recent Labs     04/04/20  0542 04/05/20  0600 04/06/20  0620    139 140   K 3.7 3.8 3.3*    100 101   CO2 25 23 27   BUN 36* 28* 20   CREATININE 2.7* 2.2* 1.7*   CALCIUM 7.7* 7.8* 7.6*   GLUCOSE 144* 88 96      ABG:  No results for input(s): PH, PO2ART, QPK1CEO, HCO3, BEART, O2SAT in the last 72 hours. BNP  Lab Results   Component Value Date    BNP 56 11/20/2012      D-Dimer:  Lab Results   Component Value Date    DDIMER 1015 (H) 04/01/2020      1.  Radiology:

## 2020-04-06 NOTE — ADT AUTH CERT
Utilization Reviews         Sepsis and Other Febrile Illness, without Focal Infection - Care Day 21 (4/5/2020) by Palmira Cast RN         Review Status Review Entered   Completed 4/6/2020 11:48       Criteria Review      Care Day: 21 Care Date: 4/5/2020 Level of Care:    Guideline Day 4    Level Of Care    (X) Floor to discharge [J]    Clinical Status    ( ) * Hemodynamic stability    (X) * Fever absent or acceptable for next level of care    (X) * Hypoxemia absent    (X) * Tachypnea absent    (X) * Cultures negative or infection identified and under adequate treatment    ( ) * Mental status at baseline    (X) * Metabolic derangement (eg, dehydration, acidosis) absent    ( ) * End-organ dysfunction (eg, myocardial ischemia, renal failure) absent    ( ) * Discharge plans and education understood    Activity    ( ) * Ambulatory    Routes    ( ) * Oral hydration, medications [K]    Interventions    (X) WBC    Medications    ( ) * Antimicrobial treatment not necessary or treatment at next level of care arranged [E]    * Milestone   Additional Notes   4/5 --      Rosales Bryan is a 44 y.o. Claudio Ko presents with Shortness of Breath;  Chest Pain (left sternal, constant 10/10 burning chest pain.); and Fatigue           Assessment and Plan:   Acute pancreatitis   - resolved   - on diet now   - check ct abd/pelvis: acute pancreatitis with no necrosis or pseudocyst   - h/o alcohol, RUQ US with sludge no cholelithiasis       Ileus , partial bowel obstruction.    - resolved   - TPN d/c   - NGT out   - tolerating diet        Alcohol dependence with withdrawal , DT   -resolved   - oral, thiamine, folic acid   - No s/sx of withdrawal anymore   - CIWA with ativan prn.    - 3/21 Started on precedex IV, off now       Acute renal failure,  (baseline 0.9)   - improving   - IVF NS d/c   - d/c marina    - Consulted Nephro; holding ACEi   - d/c neurontin   - d/c Vanc       Mild Hyponatremia   - resolved        Early signs of frostbite left side   - consult IR for repeat thoracentesis 3/30: 600cc from right side and 1175cc from left removed       Anemia, r/o GI bleed   -stable   - will need outpt EGD/C-Scope, per surgery   - s/p 1 unit PRBC   - +FOBT now   - consult GI, they refused to see pt and wanted surgery to see   - check iron panel: low, give IV venofer x 3 doses   - monitor CBC   - d/c heparin for now       Acute Resp Failure likely 2/2 HAP   - clinically improving, NC O2 wean as tolerated   - start IV Zyvox + Cefepime + Zithromax, de-escalate to oral cefdinir to complete course   - check CXR: + b/l airspace disease vs effusions   - check CT chest: large b/l effusions noted, peribronchovascular consolidation and ground glass opacities noted   - check urine strep, legionella: neg   - check resp cx   - check resp pcr: neg   - check blood cx NTD   - consult pulm       C. Diff   - treat with fidaxomicin  BID x 10 days        Chest Pain likely musculoskeletal from chest compressions, he is very tender at sternum   -stable   - d/w Radiologist and unable to do v/q at this time due to hazy CXR and PNA, stated that results would only be indeterminant at best even if attempted, since his symptoms improved will hold off now.    - get trops x2   - get EKG   - venous duplex neg b/l   - consult cardio           Chronic   - COPD- Not in exacerbation; cont home spiriva, albuterol   - Neuropathy- hold neurontin   - Depression- Cont paxil       SCD for dvt ppx       SNF placement pending      Nephrology   - now with c diff   - Cr down to 2.7   - continue supportive mgmt          Pulmonary and Critical Care   Progress Note       Assessment/Plan       Patient Active Problem List     Diagnosis Date Noted   · Severe malnutrition ([address] 04/02/2020   · Acute renal failure (ARF) ([address] 03/16/2020   · Lumbosacral radiculopathy at S1 05/22/2019   · BETTY (acute kidney injury) ([address] 05/03/2017   · Alcohol abuse 05/03/2017   · Simple chronic bronchitis ([address] 12/21/2016   ·

## 2020-04-06 NOTE — PROGRESS NOTES
Daily Progress Note     patient is awake alert   No chest pain , heart rate and BP stable  He has chest wall pain  Diarrhea improving   Stable from cardiac stand   Change to coreg from lopressor   HTN on meds watch for now     Cr is 1.7 today  watch for now  HTN on meds continue --stable    DVT negative  CT negative for PE  Renal injury possible NGA  Anemia stable    increase activity      Echo--Summary   This is a limited echocardiogram.   Left ventricular systolic function is normal.   Ejection fraction is visually estimated at 50%.   No evidence of any pericardial effusion.   Pleural effusion present.  RVSP is normal   Essentially unchanged from previous echo 3/25/20.     Objective:   BP (!) 172/98   Pulse 90   Temp 97.8 °F (36.6 °C) (Oral)   Resp 19   Ht 6' 2\" (1.88 m)   Wt 184 lb 4.9 oz (83.6 kg)   SpO2 95%   BMI 23.66 kg/m²       Intake/Output Summary (Last 24 hours) at 4/6/2020 1054  Last data filed at 4/6/2020 8643  Gross per 24 hour   Intake 320 ml   Output 2100 ml   Net -1780 ml       Medications:   Scheduled Meds:   carvedilol  25 mg Oral BID WC    ferrous sulfate  325 mg Oral BID WC    Fidaxomicin  200 mg Oral BID    cefdinir  300 mg Oral 2 times per day    hydrALAZINE  50 mg Oral 3 times per day    sodium chloride flush  10 mL Intravenous 2 times per day    nicotine  1 patch Transdermal Daily    sodium chloride flush  10 mL Intravenous 2 times per day    folic acid  1 mg Oral Daily    thiamine  100 mg Oral Daily    sodium chloride flush  10 mL Intravenous 2 times per day    PARoxetine  20 mg Oral Daily    tiotropium  2 puff Inhalation Daily    pantoprazole  40 mg Intravenous Daily      Infusions:   dextrose        PRN Meds:  LORazepam, glucose, dextrose, glucagon (rDNA), dextrose, traMADol, acetaminophen, sodium chloride flush, albuterol, benzocaine, nicotine polacrilex, potassium chloride, magnesium sulfate, sodium chloride flush, sodium chloride flush, polyethylene glycol,

## 2020-04-06 NOTE — PROGRESS NOTES
Hospitalist Progress Note      Name:  Huma Tracy /Age/Sex: 1970  (44 y.o. male)   MRN & CSN:  [id number] & [id number] Admission Date/Time: 3/16/2020  5:52 PM   Location:  -A PCP: Aditya Martell MD       Huma Tracy is a 44 y.o.  male  who presents with Shortness of Breath;  Chest Pain (left sternal, constant 10/10 burning chest pain.); and Fatigue      Assessment and Plan:   Acute pancreatitis  - resolved  - on diet now  - check ct abd/pelvis: acute pancreatitis with no necrosis or pseudocyst  - h/o alcohol, RUQ US with sludge no cholelithiasis     Ileus , partial bowel obstruction.   - resolved  - TPN d/c  - NGT out  - tolerating diet      Alcohol dependence with withdrawal , DT  -resolved  - oral, thiamine, folic acid  - No s/sx of withdrawal anymore  - CIWA with ativan prn.   - 3/21 Started on precedex IV, off now     Acute renal failure,  (baseline 0.9)  - improving  - IVF NS d/c  - d/c marina   - Consulted Nephro; holding ACEi  - d/c neurontin  - d/c Vanc     Mild Hyponatremia  - resolved      Early signs of frostbite  Peripheral vascular disease  -stable  - bilateral toes discoloration and impaired sensation  - concern about frost bite injury, arterial doppler with no major abnormality  - consulted surgery, vascular surgery  - conservative management. Neurontin, Oxycodone prn pain     Transient AMS/unresponsiveness  - resolved  - In ED while not on telemetry- unconfirmed pulselessness   - No defibrillation/ACLS meds given; CPR done for 10 - 15 seconds  - CT head without acute process; CTA chest- no PE  - Immediately AAOX4 and on room air after episode  - 3/21 having DT's, started on precedex  - 3/23 improved mental status     Essential HTN  -stable  - hydralazine + coreg     Hyperglycemia  - resolved, likely from D5 infusion   - No known hx of DM, A1c 5.0     Homeless  - Consulted to  for d/c planning needs      HypoMG  - replaced     Sinus Tachy  -stable  - replaced MG  - start lopressor, increased to 100 BID  - check echo: ef 50-55%  - monitor     Mod B/L Pleural Effusions  - s/p IR thoracentesis 3/26: 450cc from right side, 900cc from left side  - consult IR for repeat thoracentesis 3/30: 600cc from right side and 1175cc from left removed     Anemia, r/o GI bleed  -stable  - will need outpt EGD/C-Scope, per surgery  - s/p 1 unit PRBC  - +FOBT now  - consult GI, they refused to see pt and wanted surgery to see  - check iron panel: low, give IV venofer x 3 doses, start oral iron  - monitor CBC  - d/c heparin for now     Acute Resp Failure likely 2/2 HAP  - clinically improving, NC O2 weaned off now  - start IV Zyvox + Cefepime + Zithromax, de-escalate to oral cefdinir to complete course  - check CXR: + b/l airspace disease vs effusions  - check CT chest: large b/l effusions noted, peribronchovascular consolidation and ground glass opacities noted  - check urine strep, legionella: neg  - check resp cx  - check resp pcr: neg  - check blood cx NTD  - consult pulm     C. Diff  - treat with fidaxomicin  BID x 10 days      Chest Pain likely musculoskeletal from chest compressions, he is very tender at sternum  -stable  - d/w Radiologist and unable to do v/q at this time due to hazy CXR and PNA, stated that results would only be indeterminant at best even if attempted, since his symptoms improved will hold off now.   - get trops x2  - get EKG  - venous duplex neg b/l  - consult cardio    HypoK  - replace        Chronic  - COPD- Not in exacerbation; cont home spiriva, albuterol  - Neuropathy- hold neurontin  - Depression- Cont paxil     SCD for dvt ppx     SNF placement pending            Diet DIET GENERAL; Low Potassium  Dietary Nutrition Supplements: Low Calorie High Protein Supplement   Code Status Full Code     Medications:   Medications:    carvedilol  25 mg Oral BID WC    ferrous sulfate  325 mg Oral BID WC    Fidaxomicin  200 mg Oral BID    cefdinir  300 mg Oral 2 times per day   

## 2020-04-07 NOTE — PROGRESS NOTES
None      Assessment/Plan     Patient Active Problem List    Diagnosis Date Noted    Severe malnutrition ([facility] 04/02/2020    Acute renal failure (ARF) ([facility] 03/16/2020    Lumbosacral radiculopathy at S1 05/22/2019    BETTY (acute kidney injury) ([facility] 05/03/2017    Alcohol abuse 05/03/2017    Simple chronic bronchitis ([facility] 12/21/2016    Essential hypertension 12/14/2016    Cramps of lower extremity 29/52/1807    Alcoholic hepatitis without ascites 10/11/2016    Chronic obstructive pulmonary disease ([facility] 10/06/2015    Steatohepatitis 04/27/2015    Abnormal LFTs 04/15/2015    Anxiety 10/09/2014    Panic attack      Patient is lying in the bed. He is not in acute resp distress    BETTY resolved  Hypokalemia  Leukocytosis- improved  Anemia-stable  Coagulopathy- improved  Bilateral pneumonia- improving  Bilateral pleural effusions L > R s/p thoracentesis  Recent pancreatitis  Acute hypoxic resp failure - slowly improving  Lactic acidosis - resolved  H/o Alcohol abuse   severe malnutrition      1. Abx  2. F/u C&S  3. ICs  4. Nebs  5. BMP in am  6. OOB  7. Await placement  8. C/w present management  No follow-ups on file.     Electronically signed by Vinicius Diaz MD on 4/7/2020 at 12:03 PM

## 2020-04-07 NOTE — CARE COORDINATION
Phoned and spoke with [facility] at [facility] . She stated that she has this referral and is in contact with [facility] and is waiting to hear back from them. She stated that once [facility] confirms that they can take patient once he is finished at [facility] she will proceed to [facility] .

## 2020-04-07 NOTE — PROGRESS NOTES
Physical Therapy    Physical Therapy Treatment Note  Name: Florencia Serrano MRN: [id number] :   1970   Date:  2020   Admission Date: 3/16/2020 Room:  -A   Restrictions/Precautions:        isolation  Communication with other providers:  Alicia Lincoln RN states pt is ok to see for therapy  Subjective:  Patient states:  He is feeling better today but still getting tired from ex and activity  Pain:   Location, Type, Intensity (0/10 to 10/10):  5/10 in UE's after his OT this am  Objective:    Observation:  Pt was resting in the bed  Treatment, including education/measures:  Transfers  Supine to sit :CGA  Sit to supine :Ind  Scooting :CGA  Rolling :CGA  Sit to stand :CGA  Stand to sit :CGA  Pt took 5 steps to the chair with no AD and CGA of 1  Sitting Exercises: Ankle pumps x 30  Glute sets x 20  LAQ's x 20  Marching x 20   Pillow squeezes x 20  Hip Abd x 20  Therapeutic Exercise:  Therapeutic exercises were instructed today. Cues were given for technique, safety, recruitment, and rationale. Cues were verbal and/or tactile. Gait:  Pt amb with no AD for 60 ft with CGA to min a for balance assist  Pt needed VC's for pathway and balance  Gait Comments: with VC's' and TC's for B LE placement, walker placement and sequence throughout ambulation; with VC's and TC's to maintain upright posture in order to avoid COM shifting outside of NAUN; with VC's for PLB throughout ambulation  Safety  Patient left safely in the bed, with call light/phone in reach with alarm applied. Gait belt was used for transfers and gait.   Assessment / Impression:     Patient's tolerance of treatment:  Good, fatigues quickly but cont to try to progress himself   Adverse Reaction: none  Significant change in status and impact:  none  Barriers to improvement:  Endurance, weakness  Plan for Next Session:    Will cont to work towards pt's goals per his tolerance, try standing balance ex  Time in:  1020  Time out:  1120  Timed treatment minutes: 60  Total treatment time:  61  Previously filed items:  Social/Functional History  Type of Home: Homeless  ADL Assistance: Independent  Ambulation Assistance: Independent  Transfer Assistance: Independent  Active : No  Mode of Transportation: Walk  Type of occupation: Pt is homeless  Additional Comments: Pt reports no recent falls in last 6 months  Short term goals  Time Frame for Short term goals: 1 week   Short term goal 1: Pt will perform sit><supine Christian   Short term goal 2: Pt will transfer sit><stand Christian   Short term goal 3: Pt will transfer between surfaces Christian   Short term goal 4: Pt will ambulate 100ft with [id number] [address]    Electronically signed by:     Sarabjit Edwards PTA  4/7/2020, 11:10 AM

## 2020-04-07 NOTE — CONSULTS
[address] [id number] Continence Nurse  Consult Note       Mu Saldaña  AGE: 44 y.o. GENDER: male  : 1970  TODAY'S DATE:  2020    Subjective:     Reason for  Evaluation and Assessment: wound reassessment      Sujata Doan is a 44 y.o. male referred by:   [x] Physician  [] Nursing  [] Other:     Wound Identification:  Wound Type: traumatic  Contributing Factors: decreased mobility, malnutrition and substance abuse        PAST MEDICAL HISTORY        Diagnosis Date    [facility]    History of alcohol abuse     Quit early 2012    History of idiopathic seizure 2012    Hypertension     Panic attack        PAST SURGICAL HISTORY    Past Surgical History:   Procedure Laterality Date    FOOT SURGERY      right foot tendon release as a baby    TONSILLECTOMY         FAMILY HISTORY    Family History   Problem Relation Age of Onset    High Blood Pressure Mother     Other Mother         MS    COPD Mother     COPD Father     Alcohol Abuse Father          47    Tuberculosis Maternal Grandfather        SOCIAL HISTORY    Social History     Tobacco Use    Smoking status: Former Smoker     Packs/day: 0.50     Types: Cigarettes     Start date: 2015    Smokeless tobacco: Never Used   Substance Use Topics    Alcohol use: Yes     Comment: states stopped drinking in December here and there    Drug use: No       ALLERGIES    No Known Allergies    MEDICATIONS    No current facility-administered medications on file prior to encounter.       Current Outpatient Medications on File Prior to Encounter   Medication Sig Dispense Refill    tiotropium (SPIRIVA HANDIHALER) 18 MCG inhalation capsule Inhale 1 capsule into the lungs daily 30 capsule 5    lisinopril (ZESTRIL) 30 MG tablet Take 1 tablet by mouth daily 30 tablet 5    albuterol sulfate HFA (PROVENTIL HFA) 108 (90 Base) MCG/ACT inhaler Inhale 2 puffs into the lungs every 6 hours as needed for Wound Cleansed Wound cleanser 4/7/2020  2:30 PM   Wound Length (cm) 1 cm 4/7/2020  2:30 PM   Wound Width (cm) 2 cm 4/7/2020  2:30 PM   Wound Depth (cm) 0.1 cm 4/7/2020  2:30 PM   Wound Surface Area (cm^2) 2 cm^2 4/7/2020  2:30 PM   Change in Wound Size % (l*w) 13.04 4/7/2020  2:30 PM   Wound Volume (cm^3) 0.2 cm^3 4/7/2020  2:30 PM   Wound Healing % 13 4/7/2020  2:30 PM   Distance Tunneling (cm) 0 cm 4/7/2020  2:30 PM   Tunneling Position ___ O'Clock 0 4/7/2020  2:30 PM   Undermining Starts ___ O'Clock 0 4/7/2020  2:30 PM   Undermining Ends___ O'Clock 0 4/7/2020  2:30 PM   Undermining Maxium Distance (cm) 0 4/7/2020  2:30 PM   Wound Assessment Dry;Black 4/7/2020  2:30 PM   Drainage Amount None 4/7/2020  2:30 PM   Odor None 4/7/2020  2:30 PM   Margins Attached edges 4/7/2020  2:30 PM   Laly-wound Assessment Dry 4/7/2020  2:30 PM   Non-staged Wound Description Not applicable 7/3/6715  9:87 PM   Black%Wound Bed 100 4/7/2020  2:30 PM   Number of days: 15       Response to treatment:  Well tolerated by patient. Pain Assessment:  Severity:  none  Quality of pain: na  Wound Pain Timing/Severity: na  Premedicated: no    Plan:     Plan of Care:     Wound 03/23/20 Toe (Comment  which one) Anterior;Right 2nd toe-Dressing/Treatment: Betadine swabs, Open to air  [REMOVED] Wound 03/31/20 Buttocks Right-Dressing/Treatment: Open to air(calazime)    Pt in recliner. Agreeable to wound assessment. Frostbite injury to toes. Right 2nd toe with black eschar. Intact. Discoloration improving. Picture and measurements taken. Right buttock wound now healed. EMIR. Pt stated diarrhea is improved. Pt is at mild risk for skin breakdown AEB Davonte. Follow Davonte orders.      Specialty Bed Required : yes  [x] Low Air Loss   [x] Pressure Redistribution  [] Fluid Immersion  [] Bariatric  [] Total Pressure Relief  [] Other:     Discharge Plan:  Placement for patient upon discharge: tbd  Hospice Care: no  Patient appropriate for Outpatient Wound

## 2020-04-07 NOTE — PROGRESS NOTES
Occupational Therapy    Occupational Therapy Treatment Note  Name: Rufus Mathew MRN: [id number] :   1970   Date:  2020   Admission Date: 3/16/2020 Room:  -A   Restrictions/Precautions:    Fall risk, Cdiff precautions. Communication with other providers:   Cleared for treatment by Foster Noriega RN. Subjective:  Patient states:  \"I'm doing OK\"  Pain:   Location, Type, Intensity (0/10 to 10/10): 4/10 feet  Objective:    Observation:  Pt alert and oriented. Treatment, including education:      Therapeutic Exercise:  Cues were given for technique, safety, recruitment, and rationale. Cues were verbal and/or tactile. For BUE strengthening for ADL & functional mobility Indep pt performed BUE strengthening HEP c 3# hand weights x 10 reps x 6 exercises with Minimal difficulty. For BUE strengthening for ADL & functional mobility Indep pt performed BUE strengthening HEP using green strength theraband  X 3 exer for R/L UE x 15 reps. Therapeutic Activity Training:   Therapeutic activity training was instructed today. Cues were given for safety, sequence, UE/LE placement, awareness, and balance. Activities performed today included bed mobility training, sup-sit, sit-stand, SPT. Safety Measures:  Left in bed, Pull/Bed Alarm activated and call light left in reach          Assessment / Impression:        Patient's tolerance of treatment: Good   Adverse Reaction: None  Significant change in status and impact:  None  Barriers to improvement:  Decreased strength    Plan for Next Session:    Continue with POC.     Time in:  0935  Time out:  1015  Timed treatment minutes:  40  Total treatment time:  40  Electronically signed by:    Qiana Heredia, [address]    2020, 10:27 AM    Previously filed values:

## 2020-04-07 NOTE — PROGRESS NOTES
- cr down to 1.5, better  - He is doing ok  - nonoliguric    A&P:  - BETTY from ATN //cr 2.7 on admission from baseline of 0.9 but it ended up peaking at 6//received iv contrast on admission so some NGA is also involved//no HN on imaging studies//UA shows no rbcs and only 1 wbcs on admission  - Hyponatremia: resolved//had developed hypernatremia from NS load and it has resolved  - Anion gap metabolic acidosis  - Acute pancreatitis: lipase of 3000  - transaminitis  - Hx of alcohol abuse: had supposedly quit, serum alcohol was positive//alcohol withdrawal: resolved  - Ileus from pancreatitis: resolved  - HTN   - COPD  - Anxiety  - hypokalemia: resolved  - BIlateral pleural effusions; s/p tap  - acute hypoxic resp failure from pneumonia

## 2020-04-08 NOTE — PROGRESS NOTES
Nutrition Assessment    Type and Reason for Visit: Reassess    Nutrition Recommendations:   · Continue current diet and supplements     Nutrition Assessment: Pt continues to consume 51-75% of his needs. Will continue current diet and supplements as ordered. Pt is now at moderate risk    Malnutrition Assessment:  · Malnutrition Status: Meets the criteria for severe malnutrition  · Context: Social or environmental circumstances  · Findings of the 6 clinical characteristics of malnutrition (Minimum of 2 out of 6 clinical characteristics is required to make the diagnosis of moderate or severe Protein Calorie Malnutrition based on AND/ASPEN Guidelines):  1. Energy Intake-Less than or equal to 50% of estimated energy requirement, Greater than or equal to 7 days    2. Weight Loss-No significant weight loss, in 3 months  3. Fat Loss-Severe subcutaneous fat loss, Orbital  4. Muscle Loss-Severe muscle mass loss, Clavicles (pectoralis and deltoids)  5. Fluid Accumulation-No significant fluid accumulation, Generalized  6.  Strength-Not measured    Nutrition Risk Level:  Moderate    Nutrient Needs:  · Estimated Daily Total Kcal: 0806-0477 based on MSJ  · Estimated Daily Protein (g):  based on 1-1.2 g/kg/IBW  · Estimated Daily Total Fluid (ml/day): 1961-7853 based on 1 mL/kcal    Nutrition Diagnosis:   · Problem: Severe malnutrition, In context of social or environmental circumstances  · Etiology: related to Insufficient energy/nutrient consumption     Signs and symptoms:  as evidenced by Severe loss of subcutaneous fat, Severe muscle loss    Objective Information:  · Wound Type: None  · Current Nutrition Therapies:  · Oral Diet Orders: General, Low Potassium   · Oral Diet intake: 51-75%  · Oral Nutrition Supplement (ONS) Orders: Low Calorie High Protein Supplement  · ONS intake: Unable to assess  · Anthropometric Measures:  · Ht: 6' 2\" (188 cm)   · Current Body Wt: 185 lb (83.9 kg)  · Admission Body Wt: 179 lb (81.2 kg)  · Usual Body Wt: 170 lb (77.1 kg)  · % Weight Change: none noted  · Ideal Body Wt: 190 lb (86.2 kg), % Ideal Body 97%  · BMI Classification: BMI 25.0 - 29.9 Overweight(26.5)    Nutrition Interventions:   Continue current diet, Continue current ONS  Continued Inpatient Monitoring, Education not appropriate at this time, Coordination of Care    Nutrition Evaluation:   · Evaluation: Progressing toward goals   · Goals: pt will consume greater than 75% of his meals    · Monitoring: Meal Intake, Weight, Pertinent Labs, Mental Status/Confusion      Electronically signed by Alejandrina Lakhani RD, LD on 9/6/46 at 12:01 PM EDT    Contact Number: [phone]

## 2020-04-08 NOTE — PROGRESS NOTES
Pulmonary and Critical Care  Progress Note      VITALS:  BP (!) 146/97   Pulse 116   Temp 98.7 °F (37.1 °C) (Oral)   Resp 20   Ht 6' 2\" (1.88 m)   Wt 185 lb 3 oz (84 kg)   SpO2 98%   BMI 23.78 kg/m²     Subjective:   CHIEF COMPLAINT :Fatigue     HPI:                The patient is a 44 y.o. male with significant past medical history of COPD, HTN, Alcohol abuse quit inn 2012.   presents with complaints of Fatigue of 2 days. He is a home less person. He was dehydrated when he presented to ER . He had an episode of unconsciousness responded to CPR. He had BETTY and sepsis. He also had pancreatitis. Couple of days ago he had bilateral pleural effusions which were drained. I was called for suspected pneumonia. CXR done on 03/30/2020 post thoracentesis showed diffuse perhilar opacities. His albumin is 2.5 on 03/23/2020. His EF is 50-55%. His last creatinine is 5.4. His CT chest done on 03/30/2020 showed suspected pneumonia with congestion and acute pancreatitis. At this time he is lying in the bed,. He is in mild resp distress. Objective:   PHYSICAL EXAM:    LUNGS:Occasional basal crackles  Abd-soft, BS+,NT  Ext - no pedal edema  CVS-s1s2, no murmurs      DATA:    CBC:  Recent Labs     04/06/20 0620 04/07/20  0552 04/08/20  0445   WBC 13.6* 12.2* 11.8*   RBC 2.66* 2.64* 2.81*   HGB 8.1* 8.1* 8.6*   HCT 25.4* 25.6* 26.7*   * 477* 470*   MCV 95.5 97.0 95.0   MCH 30.5 30.7 30.6   MCHC 31.9* 31.6* 32.2   RDW 15.6* 15.7* 15.9*      BMP:  Recent Labs     04/06/20  0620 04/07/20  0552 04/08/20  0445    141 141   K 3.3* 3.5 3.3*    101 101   CO2 27 27 28   BUN 20 15 12   CREATININE 1.7* 1.5* 1.4*   CALCIUM 7.6* 7.6* 7.6*   GLUCOSE 96 162* 121*      ABG:  No results for input(s): PH, PO2ART, DPV0XLO, HCO3, BEART, O2SAT in the last 72 hours. BNP  Lab Results   Component Value Date    BNP 56 11/20/2012      D-Dimer:  Lab Results   Component Value Date    DDIMER 1015 (H) 04/01/2020      1.  Radiology: VQ

## 2020-04-08 NOTE — PROGRESS NOTES
Josefina Davis is a 44 y.o. male patient complains of pain upon breathing and when this occurs he has to \"double up\" his breathing    Current Facility-Administered Medications   Medication Dose Route Frequency Provider Last Rate Last Dose    potassium chloride (KLOR-CON M) extended release tablet 20 mEq  20 mEq Oral Once Charles Murguia DO        carvedilol (COREG) tablet 25 mg  25 mg Oral BID  Thierno Gaxiola MD   25 mg at 04/08/20 0827    ferrous sulfate (IRON 325) tablet 325 mg  325 mg Oral BID  Steve Walsh MD   325 mg at 04/08/20 0827    Fidaxomicin (DIFICID) tablet 200 mg  200 mg Oral BID Steve Walsh MD   200 mg at 04/08/20 0827    cefdinir (OMNICEF) capsule 300 mg  300 mg Oral 2 times per day Steve Walsh MD   300 mg at 04/08/20 0827    LORazepam (ATIVAN) tablet 1 mg  1 mg Oral Q8H PRN Steve Walsh MD   1 mg at 04/08/20 0233    glucose (GLUTOSE) 40 % oral gel 15 g  15 g Oral PRN Lexy Momo, APRN - CNP        dextrose 50 % IV solution  12.5 g Intravenous PRN Lexy Barr, APRN - CNP        glucagon (rDNA) injection 1 mg  1 mg Intramuscular PRN Lexy Momo, APRN - CNP        dextrose 5 % solution  100 mL/hr Intravenous PRN Lexy Barr APRN - CNP        traMADol (ULTRAM) tablet 50 mg  50 mg Oral Q6H PRN Steve Walsh MD   50 mg at 04/08/20 0233    acetaminophen (TYLENOL) tablet 650 mg  650 mg Oral Q4H PRN Steve Walsh MD   650 mg at 04/01/20 1822    hydrALAZINE (APRESOLINE) tablet 50 mg  50 mg Oral 3 times per day Yassine Borges MD   50 mg at 04/08/20 0451    sodium chloride flush 0.9 % injection 10 mL  10 mL Intravenous 2 times per day Yassine Borges MD   10 mL at 04/08/20 0827    sodium chloride flush 0.9 % injection 10 mL  10 mL Intravenous PRN Yassine Borges MD   10 mL at 04/02/20 2108    albuterol (PROVENTIL) nebulizer solution 2.5 mg  2.5 mg Nebulization Q6H PRN Yassine Borges MD   2.5 mg at 03/30/20 0326    benzocaine (HURRICAINE) 20 % oral Allergies  Active Problems:    Acute renal failure (ARF) (HCC)    Severe malnutrition (HCC)  Resolved Problems:    * No resolved hospital problems. *    Blood pressure (!) 146/97, pulse 116, temperature 98.7 °F (37.1 °C), temperature source Oral, resp. rate 20, height 6' 2\" (1.88 m), weight 185 lb 3 oz (84 kg), SpO2 98 %. Subjective:  Symptoms:  Stable. Diet:  Adequate intake. Pain:  He complains of pain that is mild. Objective:  General Appearance:  Comfortable. Vital signs: (most recent): Blood pressure (!) 146/97, pulse 116, temperature 98.7 °F (37.1 °C), temperature source Oral, resp. rate 20, height 6' 2\" (1.88 m), weight 185 lb 3 oz (84 kg), SpO2 98 %. Vital signs are normal.    HEENT: Normal HEENT exam.    Heart: Normal rate. Abdomen: Bowel sounds are normal.     Extremities: Decreased range of motion. Neurological: Patient is alert. Pupils:  Pupils are equal, round, and reactive to light. Skin:  Warm. (Right 2nd toe with some mild necrosis)    Assessment & Plan  Acute pancareatitis(resolved)  -CT abd with no pseudocyst, RUQ US with no stone  -recommend CT abd outpt to f/u  Etoh use with withdrawal(resolved)  PSBO/ileus(resolved)  Acute kidney injury(resolved)  -ACE held, dc neurontin  -Hold ACE  PVD with frosbite and mild ischemia  -surg and vasc with  Conservative managemant  -no DVT and art doppler with PVC  Metabolic encpehalopathy(Resolved)  -due to DT/etoh withdrawal  HTN  -hydralazine and BB  B/l effusion  -s/p bilateral thoracentesis  3/26: 450cc from right side, 900cc from left side  - repeat thoracentesis 3/30: 600cc from right side and 1175cc from left    Anemia  R/o GI bleed  -s/p transfusion, iron panel and venofer givn  -outpt egd. cscope  -ferrous sulfate  Acute resp failure  2/2 suspected gram neg pna  -resp PCR,legionella, strep neg  -inhalers  -cefdinir  Clostridium difficile  -fidoxaimn 10 days      Has chest pain and noted elevated DDimer and this could be due

## 2020-04-08 NOTE — PROGRESS NOTES
alert   Chest: Nontender  Cardiac: sinus   Lungs:Clear to auscultation and percussion. Abdomen: Soft, NT, ND, +BS  Extremities:  No edema   Vascular:  Equal 2+ peripheral pulses. Lab Data:  CBC:   Recent Labs     04/06/20  0620 04/07/20  0552 04/08/20  0445   WBC 13.6* 12.2* 11.8*   HGB 8.1* 8.1* 8.6*   HCT 25.4* 25.6* 26.7*   MCV 95.5 97.0 95.0   * 477* 470*     BMP:   Recent Labs     04/06/20  0620 04/07/20  0552 04/08/20  0445    141 141   K 3.3* 3.5 3.3*    101 101   CO2 27 27 28   BUN 20 15 12   CREATININE 1.7* 1.5* 1.4*     LIVER PROFILE: No results for input(s): AST, ALT, LIPASE, BILIDIR, BILITOT, ALKPHOS in the last 72 hours. Invalid input(s): AMYLASE,  ALB  PT/INR: No results for input(s): PROTIME, INR in the last 72 hours. APTT: No results for input(s): APTT in the last 72 hours. BNP:  No results for input(s): BNP in the last 72 hours.       Assessment:  Patient Active Problem List    Diagnosis Date Noted    Severe malnutrition ([facility] 04/02/2020    Acute renal failure (ARF) ([facility] 03/16/2020    Lumbosacral radiculopathy at S1 05/22/2019    BETTY (acute kidney injury) ([address] 05/03/2017    Alcohol abuse 05/03/2017    Simple chronic bronchitis ([address] 12/21/2016    Essential hypertension 12/14/2016    Cramps of lower extremity 84/36/2963    Alcoholic hepatitis without ascites 10/11/2016    Chronic obstructive pulmonary disease ([address] 10/06/2015    Steatohepatitis 04/27/2015    Abnormal LFTs 04/15/2015    Anxiety 10/09/2014    Panic attack        Sneha Guzman MD 4/8/2020 7:41 AM

## 2020-04-09 NOTE — ADT AUTH CERT
Utilization Reviews         Sepsis and Other Febrile Illness, without Focal Infection - Care Day 25 (4/9/2020) by Zeeshan Aguilar RN         Review Status Review Entered   Completed 4/9/2020 10:49       Criteria Review      Care Day: 25 Care Date: 4/9/2020 Level of Care: Step Down    Guideline Day 4    Level Of Care    (X) Floor to discharge [J]    Clinical Status    (X) * Hemodynamic stability    (X) * Fever absent or acceptable for next level of care    (X) * Hypoxemia absent    (X) * Tachypnea absent    (X) * Cultures negative or infection identified and under adequate treatment    ( ) * Mental status at baseline    (X) * Metabolic derangement (eg, dehydration, acidosis) absent    ( ) * End-organ dysfunction (eg, myocardial ischemia, renal failure) absent    ( ) * Discharge plans and education understood    Activity    ( ) * Ambulatory    Routes    (X) * Oral hydration, medications [K]    (X) Usual diet    Interventions    (X) WBC    Medications    (X) * Antimicrobial treatment not necessary or treatment at next level of care arranged [E]    * Milestone   Additional Notes   4/9--    Daily Progress Note        awake alert doing ok   Has diarrhea on treatment for colitis    Cr back to baseline   Anemia stable   HTN keep on meds   COPD stable       Echo--Summary    This is a limited echocardiogram.    Left ventricular systolic function is normal.    Ejection fraction is visually estimated at 50%.    No evidence of any pericardial effusion.    Pleural effusion present.     RVSP is normal    Essentially unchanged from previous echo 3/25/20.       PAST MEDICAL HISTORY:  History of hypertension, history of COPD, history of significant alcohol abuse, history of idiopathic seizure disorder and panic disorder present.           Objective:    BP (!) 164/105   Pulse 103   Temp 97.8 °F (36.6 °C) (Oral)   Resp (!) 31   Ht 6' 2\" (1.88 m)   Wt 184 lb 4.9 oz (83.6 kg)   SpO2 91%   BMI 23.66 kg/m²          Intake/Output

## 2020-04-09 NOTE — PROGRESS NOTES
[] H2 Blocker,  [] Carafate,  [] Diet/Tube Feeds   Code Status Full Code   Disposition    MDM [] Low, [] Moderate,[]  High     Chief complaint/Interval History/ROS     Chief Complaint: BETTY, anemia, pancreatitis    INTERVAL HISTORY: No change in clinical status. Awaiting placement. ROS:    No abdominal pain. No nausea. No vomiting. No fevers or chills. Objective: Intake/Output Summary (Last 24 hours) at 4/9/2020 1451  Last data filed at 4/9/2020 0549  Gross per 24 hour   Intake 20 ml   Output 700 ml   Net -680 ml      Vitals:   Vitals:    04/09/20 1202   BP: 138/87   Pulse: 91   Resp: 24   Temp: 98 °F (36.7 °C)   SpO2: 95%     Physical Exam:   GEN: Awake male, upright in bed. Nontoxic-appearing. EYES: No eye discharge. Ocular muscles intact. HENT: Membranes moist.  No nasal discharge. NECK: Supple,  RESP: Clear to auscultation, no wheezes, rales or rhonchi. Symmetric chest movement. CV: RRR. No murmur. No peripheral edema. GI: Abdomen soft. Nontender. Nondistended. :  Knox catheter is not present. MSK: No bony fractures. No gross deformities. SKIN: warm, dry, no rashes, no pressure ulcer  NEURO: Cranial nerves appear grossly intact, normal speech, no lateralizing weakness.   PSYCH: Awake, alert, oriented     Medications:   Medications:    carvedilol  25 mg Oral BID WC    ferrous sulfate  325 mg Oral BID WC    Fidaxomicin  200 mg Oral BID    hydrALAZINE  50 mg Oral 3 times per day    sodium chloride flush  10 mL Intravenous 2 times per day    nicotine  1 patch Transdermal Daily    sodium chloride flush  10 mL Intravenous 2 times per day    folic acid  1 mg Oral Daily    thiamine  100 mg Oral Daily    sodium chloride flush  10 mL Intravenous 2 times per day    PARoxetine  20 mg Oral Daily    tiotropium  2 puff Inhalation Daily    pantoprazole  40 mg Intravenous Daily      Infusions:    dextrose       PRN Meds: LORazepam, 1 mg, Q8H PRN  glucose, 15 g, PRN  dextrose, 12.5 g, PRN  glucagon (rDNA), 1 mg, PRN  dextrose, 100 mL/hr, PRN  traMADol, 50 mg, Q6H PRN  acetaminophen, 650 mg, Q4H PRN  sodium chloride flush, 10 mL, PRN  albuterol, 2.5 mg, Q6H PRN  benzocaine, , Q1H PRN  nicotine polacrilex, 4 mg, PRN  potassium chloride, 10 mEq, PRN  magnesium sulfate, 1 g, PRN  sodium chloride flush, 10 mL, PRN  sodium chloride flush, 10 mL, PRN  polyethylene glycol, 17 g, Daily PRN  ondansetron, 4 mg, Q8H PRN    Or  ondansetron, 4 mg, Q6H PRN          Electronically signed by Jodi Mejia MD on 4/9/2020 at 2:51 PM

## 2020-04-09 NOTE — PROGRESS NOTES
Pulmonary and Critical Care  Progress Note      VITALS:  BP (!) 164/105   Pulse 103   Temp 97.8 °F (36.6 °C) (Oral)   Resp (!) 31   Ht 6' 2\" (1.88 m)   Wt 184 lb 4.9 oz (83.6 kg)   SpO2 91%   BMI 23.66 kg/m²     Subjective:   CHIEF COMPLAINT :Fatigue     HPI:                The patient is a 44 y.o. male with significant past medical history of COPD, HTN, Alcohol abuse quit inn 2012.   presents with complaints of Fatigue of 2 days. He is a home less person. He was dehydrated when he presented to ER . He had an episode of unconsciousness responded to CPR. He had BETTY and sepsis. He also had pancreatitis. Couple of days ago he had bilateral pleural effusions which were drained. I was called for suspected pneumonia. CXR done on 03/30/2020 post thoracentesis showed diffuse perhilar opacities. His albumin is 2.5 on 03/23/2020. His EF is 50-55%. His last creatinine is 5.4. His CT chest done on 03/30/2020 showed suspected pneumonia with congestion and acute pancreatitis. At this time he is lying in the bed,. He is in mild resp distress. Objective:   PHYSICAL EXAM:    LUNGS:Occasional basal crackles  Abd-soft, BS+,NT  Ext - no pedal edema  CVS-s1s2, no murmurs      DATA:    CBC:  Recent Labs     04/07/20 0552 04/08/20 0445 04/09/20 0421   WBC 12.2* 11.8* 10.4   RBC 2.64* 2.81* 2.75*   HGB 8.1* 8.6* 8.4*   HCT 25.6* 26.7* 26.5*   * 470* 428   MCV 97.0 95.0 96.4   MCH 30.7 30.6 30.5   MCHC 31.6* 32.2 31.7*   RDW 15.7* 15.9* 16.1*      BMP:  Recent Labs     04/07/20  0552 04/08/20 0445 04/09/20 0421    141 138   K 3.5 3.3* 3.7    101 100   CO2 27 28 26   BUN 15 12 11   CREATININE 1.5* 1.4* 1.2   CALCIUM 7.6* 7.6* 7.3*   GLUCOSE 162* 121* 156*      ABG:  No results for input(s): PH, PO2ART, LTA1HSO, HCO3, BEART, O2SAT in the last 72 hours. BNP  Lab Results   Component Value Date    BNP 56 11/20/2012      D-Dimer:  Lab Results   Component Value Date    DDIMER 1015 (H) 04/01/2020      1.  Radiology:

## 2020-04-10 NOTE — PROGRESS NOTES
Charles Murguia,  mL/hr at 03/19/20 1340 10 mEq at 03/19/20 1340    magnesium sulfate 1 g in dextrose 5% 100 mL IVPB  1 g Intravenous PRN Charles Murguia, DO        sodium chloride flush 0.9 % injection 10 mL  10 mL Intravenous 2 times per day Brandon Jacobs MD   10 mL at 04/09/20 0847    sodium chloride flush 0.9 % injection 10 mL  10 mL Intravenous PRN Brandon Jacobs MD   10 mL at 25/89/94 6441    folic acid (FOLVITE) tablet 1 mg  1 mg Oral Daily Tushar Langley MD   1 mg at 04/10/20 7717    vitamin B-1 (THIAMINE) tablet 100 mg  100 mg Oral Daily Tushar Langley MD   100 mg at 04/10/20 7336    sodium chloride flush 0.9 % injection 10 mL  10 mL Intravenous 2 times per day Sherine Eveline, APRN - NP   10 mL at 04/08/20 2115    sodium chloride flush 0.9 % injection 10 mL  10 mL Intravenous PRN Sherine Eveline, APRN - NP        polyethylene glycol (GLYCOLAX) packet 17 g  17 g Oral Daily PRN Dorisann Jackson, APRN - NP   17 g at 03/29/20 2056    ondansetron (ZOFRAN-ODT) disintegrating tablet 4 mg  4 mg Oral Q8H PRN Dorisann Eveline, APRN - NP        Or    ondansetron (ZOFRAN) injection 4 mg  4 mg Intravenous Q6H PRN Tristenisann Eveline, APRN - NP   4 mg at 04/02/20 1205    PARoxetine (PAXIL) tablet 20 mg  20 mg Oral Daily Dorisann Jackson, APRN - NP   20 mg at 04/10/20 0928    tiotropium (SPIRIVA RESPIMAT) 2.5 MCG/ACT inhaler 2 puff  2 puff Inhalation Daily Dorisann Jackson, APRN - NP   2 puff at 04/10/20 0820           Labs:  CBC with Differential:    Lab Results   Component Value Date    WBC 9.7 04/10/2020    RBC 2.80 04/10/2020    HGB 8.5 04/10/2020    HCT 26.8 04/10/2020     04/10/2020    MCV 95.7 04/10/2020    MCH 30.4 04/10/2020    MCHC 31.7 04/10/2020    RDW 15.8 04/10/2020    SEGSPCT 90.8 03/19/2020    BANDSPCT 6 03/16/2020    LYMPHOPCT 1.8 03/19/2020    MONOPCT 5.6 03/19/2020    EOSPCT 1.6 07/19/2011    BASOPCT 0.2 03/19/2020    MONOSABS 1.3 03/19/2020    LYMPHSABS 0.4

## 2020-04-10 NOTE — CARE COORDINATION
Call from [facility]. Pt can admit to the SNF on Saturday. If Pt discharged over the weekend. Please arrange transportation with either [facility] [phone] or [facility] [phone] Pt choice. Contact Pt family. Complete RN part of the [address] [place]. Complete packet by printing AVS with DAVID, H&P, and facesheet. Call [phone] to give report. Thanks.  Jakob Hunt

## 2020-04-10 NOTE — PROGRESS NOTES
Pulmonary and Critical Care  Progress Note      VITALS:  BP (!) 150/108   Pulse 119   Temp 98.2 °F (36.8 °C) (Oral)   Resp 28   Ht 6' 2\" (1.88 m)   Wt 181 lb (82.1 kg)   SpO2 97%   BMI 23.24 kg/m²     Subjective:   CHIEF COMPLAINT :Fatigue   HPI:                The patient is a 44 y.o. male with significant past medical history of COPD, HTN, Alcohol abuse quit inn 2012.   presents with complaints of Fatigue of 2 days. He is a home less person. He was dehydrated when he presented to ER . He had an episode of unconsciousness responded to CPR. He had BETTY and sepsis. He also had pancreatitis. Couple of days ago he had bilateral pleural effusions which were drained. I was called for suspected pneumonia. CXR done on 03/30/2020 post thoracentesis showed diffuse perhilar opacities. His albumin is 2.5 on 03/23/2020. His EF is 50-55%. His last creatinine is 5.4. His CT chest done on 03/30/2020 showed suspected pneumonia with congestion and acute pancreatitis. At this time he is lying in the bed,. He is in mild resp distress. Objective:   PHYSICAL EXAM:    LUNGS:Occasional basal crackles  Abd-soft, BS+, NT  Ext - no pedal edema  CVS-s1s2, no murmurs      DATA:    CBC:  Recent Labs     04/08/20  0445 04/09/20  0421 04/10/20  0430   WBC 11.8* 10.4 9.7   RBC 2.81* 2.75* 2.80*   HGB 8.6* 8.4* 8.5*   HCT 26.7* 26.5* 26.8*   * 428 385   MCV 95.0 96.4 95.7   MCH 30.6 30.5 30.4   MCHC 32.2 31.7* 31.7*   RDW 15.9* 16.1* 15.8*      BMP:  Recent Labs     04/08/20 0445 04/09/20  0421    138   K 3.3* 3.7    100   CO2 28 26   BUN 12 11   CREATININE 1.4* 1.2   CALCIUM 7.6* 7.3*   GLUCOSE 121* 156*      ABG:  No results for input(s): PH, PO2ART, VKY0IWV, HCO3, BEART, O2SAT in the last 72 hours. BNP  Lab Results   Component Value Date    BNP 56 11/20/2012      D-Dimer:  Lab Results   Component Value Date    DDIMER 1015 (H) 04/01/2020      1.  Radiology: None      Assessment/Plan     Patient Active Problem List

## 2020-04-10 NOTE — PROGRESS NOTES
PRN  acetaminophen, 650 mg, Q4H PRN  sodium chloride flush, 10 mL, PRN  albuterol, 2.5 mg, Q6H PRN  benzocaine, , Q1H PRN  nicotine polacrilex, 4 mg, PRN  potassium chloride, 10 mEq, PRN  magnesium sulfate, 1 g, PRN  sodium chloride flush, 10 mL, PRN  sodium chloride flush, 10 mL, PRN  polyethylene glycol, 17 g, Daily PRN  ondansetron, 4 mg, Q8H PRN    Or  ondansetron, 4 mg, Q6H PRN      Laboratory and imaging data reviewed    Electronically signed by MD Bello on 4/10/2020 at 11:42 AM

## 2020-04-10 NOTE — PROGRESS NOTES
Occupational Therapy  . Occupational Therapy Treatment Note  Name: Sujata Doan MRN: [id number] :   1970   Date:  4/10/2020   Admission Date: 3/16/2020 Room:  [address] [place], [place] [address]A   Restrictions/Precautions:    General precautions; Fall Risk    Communication with other providers:  BIA Van cleared pt for OT Tx session. At end of Tx, BIA Van confirms staff awareness for condition of pt's B feet. Subjective:  Patient states:  \"I still have pain in my toes after being out in the road 15 days, my wet socks and boots froze up my toes. \" Pt reports \"feeling tired and weak if I stand or walk for very long\". Pain:   Location, Type, Intensity (0/10 to 10/10):  8/10, Lungs and B feet    Objective:    Observation: Pt received in semi-fowlers in bed, wearing gown and B  socks. This therapist checked pt's feet s/p pt's report of pain and noted darkened and several blackened areas on toes of B feet, R>L, and cracking,flaking skin from soles of feet. Objective Measures:  Telemetry, HR 92, RR 25 at beginning of Tx,   HR 95, O2 sat 96% on room air, RR 24 s/p standing and functional mobility inside room. Treatment, including education:  Therapeutic Activity Training:   Therapeutic activity training was instructed today. Cues were given for safety, sequence, UE/LE placement, awareness, and balance. Activities performed today included bed mobility training, sup-sit, sit-stand, SPT. Rolling: Mod I c bed rail  Supine to sit: Sup + cue   Sit to supine: N/A  Scooting: Sup   Sitting balance: Sup for sitting EOB  Sit to stand: CGA  Stand to sit: CGA  SPT: N/A  Functional Mobility: CGA w/o AD for steady for ~15 ft x3, and again ~8 ft, inside room. .  Standing balance / tolerance: CGA w/o AD for stand c self-support at window and for steady during functional reaching to lift shade to look out window. Pt tolerated <4 minutes before identifying fatigue and need to return to chair.  Pt required seated rest break to manage activity tolerance prior to beginning therapeutic exercises. Therapeutic Exercise:  Cues were given for technique, safety, recruitment, and rationale. Cues were verbal and/or tactile. Pt Sup + minimal verbal / visual demonstration for technique to perform BUE AROM exercises to include: shoulder flex/extension, internal/external rotation, chest presses, bicep curls, rowing, and supination/pronation x15 reps each and education to perform outside Tx session c use of self-pacing c rest breaks PRN to manage activity tolerance. Pt required rest breaks x2 during exercise portion. No shortness of breath noted, O2 remained stable and 95% or greater throughout. All therapeutic intervention performed c emphasis on BUE AROM  therapeutic exercises / dynamic balance / standing tolerance to inc strength, endurance and act tolerance for inc Indep c ADL tasks, func transfers / mobility. Safety  Patient safely in bedside chair at end of session, with call light/phone in reach, and nursing aware. Gait belt was used for func transfers / mobility.     Assessment / Impression:        Patient's tolerance of treatment:  Well   Adverse Reaction: None  Significant change in status and impact:  Improving activity tolerance, managed c use of rest breaks  Barriers to improvement:  Continues to be limited by decreased strength, endurance    Plan for Next Session:    Continue per OT POC    Time in:  1335  Time out:  1428  Timed treatment minutes: 53  Total treatment time: 48    Electronically signed by:    HUSAM Estes  4/10/2020, 1:34 PM    Previously filed values:       Goals:  Pt goal: go home  Time Frame for STGs: discharge  Goal 1: Pt will perform UE ADLs Independent  Goal 2: Pt will perform LE ADLs Christian w/ AD  Goal 3: Pt will perform toileting Christian  Goal 4: Pt will perform functional transfer w/ AD Christian  Goal 5: Pt will perform functional mobility w/ AD Christian  Goal 6: Pt will perform therex/theract in order to increase functional activity tolerance and dynamic standing balance

## 2020-04-11 NOTE — PROGRESS NOTES
Current Inpatient Medications:    Current Facility-Administered Medications   Medication Dose Route Frequency Provider Last Rate Last Dose    pantoprazole (PROTONIX) tablet 40 mg  40 mg Oral QAM  Adeline Goldmann, MD   40 mg at 04/11/20 0708    carvedilol (COREG) tablet 25 mg  25 mg Oral BID  Melanei Celeste MD   25 mg at 04/10/20 1636    ferrous sulfate (IRON 325) tablet 325 mg  325 mg Oral BID  Sneha Castle MD   325 mg at 04/10/20 1636    Fidaxomicin (DIFICID) tablet 200 mg  200 mg Oral BID Sneha Castle MD   200 mg at 04/10/20 2147    LORazepam (ATIVAN) tablet 1 mg  1 mg Oral Q8H PRN Sneha Castle MD   1 mg at 04/10/20 2202    glucose (GLUTOSE) 40 % oral gel 15 g  15 g Oral PRN Lexy Barr APRN - CNP        dextrose 50 % IV solution  12.5 g Intravenous PRN Lexy Barr APRN - CNP        glucagon (rDNA) injection 1 mg  1 mg Intramuscular PRN Lexy Barr, APRN - CNP        dextrose 5 % solution  100 mL/hr Intravenous PRN Lexy Barr, APRN - CNP        traMADol (ULTRAM) tablet 50 mg  50 mg Oral Q6H PRN Sneha Castle MD   50 mg at 04/10/20 2202    acetaminophen (TYLENOL) tablet 650 mg  650 mg Oral Q4H PRN Sneha Castle MD   650 mg at 04/01/20 1822    hydrALAZINE (APRESOLINE) tablet 50 mg  50 mg Oral 3 times per day Shruthi Cruz MD   50 mg at 04/11/20 0701    sodium chloride flush 0.9 % injection 10 mL  10 mL Intravenous 2 times per day Shruthi Cruz MD   10 mL at 04/10/20 2151    sodium chloride flush 0.9 % injection 10 mL  10 mL Intravenous PRN Shruthi Cruz MD   10 mL at 04/02/20 2108    albuterol (PROVENTIL) nebulizer solution 2.5 mg  2.5 mg Nebulization Q6H PRN Shruthi Cruz MD   2.5 mg at 03/30/20 0326    benzocaine (HURRICAINE) 20 % oral spray   Mouth/Throat Q1H PRN Vibha aEston MD        nicotine polacrilex (NICORETTE) gum 4 mg  4 mg Oral PRN Aundrea Taylor PA-C        nicotine (NICODERM CQ) 21 MG/24HR 1 patch  1 patch Transdermal Daily Altamease Habermann MURRAY Ayala   1 patch at 04/10/20 3289    potassium chloride 10 mEq/100 mL IVPB (Peripheral Line)  10 mEq Intravenous PRN Charles Murguia,  100 mL/hr at 03/19/20 1340 10 mEq at 03/19/20 1340    magnesium sulfate 1 g in dextrose 5% 100 mL IVPB  1 g Intravenous PRN Charles Murguia, DO        sodium chloride flush 0.9 % injection 10 mL  10 mL Intravenous 2 times per day Chan Rivera MD   10 mL at 04/09/20 0847    sodium chloride flush 0.9 % injection 10 mL  10 mL Intravenous PRN Chan Rivera MD   10 mL at 44/29/28 1368    folic acid (FOLVITE) tablet 1 mg  1 mg Oral Daily Tushar Langley MD   1 mg at 04/10/20 8869    vitamin B-1 (THIAMINE) tablet 100 mg  100 mg Oral Daily Tushar Langley MD   100 mg at 04/10/20 0928    sodium chloride flush 0.9 % injection 10 mL  10 mL Intravenous 2 times per day KEREN Astorga NP   10 mL at 04/08/20 2115    sodium chloride flush 0.9 % injection 10 mL  10 mL Intravenous PRN KEREN Astorga NP        polyethylene glycol (GLYCOLAX) packet 17 g  17 g Oral Daily PRN Wally Ferrari APRN - NP   17 g at 03/29/20 2056    ondansetron (ZOFRAN-ODT) disintegrating tablet 4 mg  4 mg Oral Q8H PRN KEREN Astorga NP        Or    ondansetron (ZOFRAN) injection 4 mg  4 mg Intravenous Q6H PRN KEREN Astorga - NP   4 mg at 04/02/20 1205    PARoxetine (PAXIL) tablet 20 mg  20 mg Oral Daily KEREN Astorga NP   20 mg at 04/10/20 0928    tiotropium (SPIRIVA RESPIMAT) 2.5 MCG/ACT inhaler 2 puff  2 puff Inhalation Daily KEREN Astorga NP   2 puff at 04/10/20 0820           Labs:  CBC with Differential:    Lab Results   Component Value Date    WBC 8.8 04/11/2020    RBC 2.95 04/11/2020    HGB 8.9 04/11/2020    HCT 28.1 04/11/2020     04/11/2020    MCV 95.3 04/11/2020    MCH 30.2 04/11/2020    MCHC 31.7 04/11/2020    RDW 15.5 04/11/2020    SEGSPCT 90.8 03/19/2020    BANDSPCT 6 03/16/2020    LYMPHOPCT 1.8 03/19/2020

## 2020-04-11 NOTE — DISCHARGE SUMMARY
TO NEPHROLOGY  IP CONSULT TO CASE MANAGEMENT  IP CONSULT TO SOCIAL WORK  IP CONSULT TO GENERAL SURGERY  IP CONSULT TO VASCULAR SURGERY  IP CONSULT TO GENERAL SURGERY  IP CONSULT TO DIETITIAN  IP CONSULT TO SOCIAL WORK  IP CONSULT TO INTERVENTIONAL RADIOLOGY  IP CONSULT TO INTERVENTIONAL RADIOLOGY  IP CONSULT TO PULMONOLOGY  IP CONSULT TO CARDIOLOGY  IP CONSULT TO INFECTIOUS DISEASES    Patient Instructions:   Crista Sanchez Medication Instructions [facility]:[id number]    Printed on:04/11/20 2842   Medication Information                      albuterol sulfate HFA (PROVENTIL HFA) 108 (90 Base) MCG/ACT inhaler  Inhale 2 puffs into the lungs every 6 hours as needed for Wheezing             carvedilol (COREG) 25 MG tablet  Take 1 tablet by mouth 2 times daily (with meals)             ferrous sulfate (IRON 325) 325 (65 Fe) MG tablet  Take 1 tablet by mouth 2 times daily (with meals)             Fidaxomicin (DIFICID) 200 MG TABS tablet  Take 200 mg by mouth 2 times daily for 3 days             folic acid (FOLVITE) 1 MG tablet  Take 1 tablet by mouth daily             hydrALAZINE (APRESOLINE) 50 MG tablet  Take 1 tablet by mouth every 8 hours             hydrOXYzine (ATARAX) 50 MG tablet  Take 50 mg by mouth 3 times daily as needed for Itching             nicotine (NICODERM CQ) 21 MG/24HR  Place 1 patch onto the skin daily             omeprazole (PRILOSEC) 20 MG delayed release capsule  Take 20 mg by mouth daily             PARoxetine (PAXIL) 20 MG tablet  Take 1 tablet by mouth daily             tiotropium (SPIRIVA HANDIHALER) 18 MCG inhalation capsule  Inhale 1 capsule into the lungs daily             traMADol (ULTRAM) 50 MG tablet  Take 1 tablet by mouth every 8 hours as needed for Pain for up to 5 days.              vitamin B-1 100 MG tablet  Take 1 tablet by mouth daily                   Diet:  DIET GENERAL; Low Potassium    Activity:   activity as tolerated     Discharge Condition:   good    Disposition: home    Follow-up    Dianna Martin MD  Go to  Medical Management and follow-up labs  [address], [address]  [phone]     Anish Olson MD  Go to  Medical Management of pleural effusion  [address]  [facility] [address] [facility]  [phone]     Kendall Lanier MD  Go to  Outpatient colonoscopy  [address] [place] [address] [phone]       Time spent on discharge in the examination, evaluation, counseling and review of medications and discharge plan: 40 minutes       Discharge Physician Signed: Ju Butler

## 2020-04-11 NOTE — PROGRESS NOTES
 Severe malnutrition ([facility] 04/02/2020    Acute renal failure (ARF) ([facility] 03/16/2020    Lumbosacral radiculopathy at S1 05/22/2019    BETTY (acute kidney injury) ([facility] 05/03/2017    Alcohol abuse 05/03/2017    Simple chronic bronchitis ([facility] 12/21/2016    Essential hypertension 12/14/2016    Cramps of lower extremity 22/83/3454    Alcoholic hepatitis without ascites 10/11/2016    Chronic obstructive pulmonary disease ([facility] 10/06/2015    Steatohepatitis 04/27/2015    Abnormal LFTs 04/15/2015    Anxiety 10/09/2014    Panic attack    Patient is lying in the bed. He is in mild reps distress     BETTY resolved  Hypokalemia  Leukocytosis- improved  Anemia-stable  Coagulopathy- improved  Bilateral pneumonia- improving  Bilateral pleural effusions L > R s/p thoracentesis  Recent pancreatitis  Acute hypoxic resp failure - slowly improving  Lactic acidosis - resolved  H/o Alcohol abuse   severe malnutrition        1. Abx  2. ICs  3. Nebs  4. Await placement  5. C/w present management  No follow-ups on file.     Electronically signed by Launa Jeans, MD on 4/11/2020 at 12:35 PM

## 2020-04-11 NOTE — PLAN OF CARE
symptoms  Description: Will show no infection signs and symptoms  Outcome: Ongoing  Goal: Absence of new skin breakdown  Description: Absence of new skin breakdown  Outcome: Ongoing     Problem: Breathing Pattern - Ineffective:  Goal: Ability to achieve and maintain a regular respiratory rate will improve  Description: Ability to achieve and maintain a regular respiratory rate will improve  Outcome: Ongoing

## 2020-06-01 NOTE — PROGRESS NOTES
Patient ID: Naz Bergeron 1970    . Chief Complaint   Patient presents with    Other     frost bite, nerve damage in feet         Other   Pertinent negatives include no chest pain. Hypertension   This is a chronic problem. The problem is unchanged. Pertinent negatives include no chest pain, palpitations or shortness of breath. Past treatments include ACE inhibitors. Compliance problems include psychosocial issues. COPD   There is no shortness of breath. The problem occurs rarely. The problem has been unchanged. Pertinent negatives include no chest pain. Risk factors for lung disease include smoking/tobacco exposure. His past medical history is significant for COPD. Mental Health Problem   Primary symptoms comment: anxiety. Paxil has helped. see discharge summary   . This is a chronic problem. Leg Pain    Incident onset: gabapentin helps with the leg pain and cramping. also having lots of toe pain from nerve damage from his frostbite (see discharge summary). Gastroesophageal Reflux   He reports no chest pain. anxiety. Paxil has helped. see discharge summary   . This is a recurrent problem. He has tried a PPI for the symptoms. The treatment provided moderate relief. Poor social situation: patient was homeless and had been walking around town when he got frostbitten. He had also been abusing alcohol at the time. Review of Systems   Respiratory: Negative for shortness of breath. Cardiovascular: Negative for chest pain and palpitations. Gastrointestinal: Negative for constipation. Musculoskeletal: Negative for back pain.        Patient Active Problem List   Diagnosis    Panic attack    Anxiety    Abnormal LFTs    Steatohepatitis    Chronic obstructive pulmonary disease (HCC)    Cramps of lower extremity    Alcoholic hepatitis without ascites    Essential hypertension    Simple chronic bronchitis ([address]    BETTY (acute kidney injury) ([address]    Alcohol abuse    Lumbosacral 12/31/2019 4.7     Albumin/Globulin Ratio 12/31/2019 2.4*    Total Bilirubin 12/31/2019 1.0     Alkaline Phosphatase 12/31/2019 70     ALT 12/31/2019 18     AST 12/31/2019 19     Globulin 12/31/2019 2.0            Assessment:       Diagnosis Orders   1. Anxiety  PARoxetine (PAXIL) 20 MG tablet   2. Simple chronic bronchitis ([address]     3. Essential hypertension  lisinopril (PRINIVIL;ZESTRIL) 10 MG tablet   4. Lumbosacral radiculopathy at S1     5. Gastroesophageal reflux disease without esophagitis  pantoprazole (PROTONIX) 40 MG tablet   6. History of frostbite  gabapentin (NEURONTIN) 300 MG capsule   7. Chronic obstructive pulmonary disease, unspecified COPD type (HCC)  tiotropium (SPIRIVA HANDIHALER) 18 MCG inhalation capsule           Plan:      BP is  High. Re-starting lisinopril. He is to check a temperature of his bath water with his hands before putting his feet and to soak. Copd very well controlled. contnue current medication  Increasing the gabapentin to 3 times a day. Return in about 3 weeks (around 6/22/2020), or nerve damage feet, for HTN, COPD, In office.

## 2020-06-01 NOTE — PATIENT INSTRUCTIONS
October Fifth is the DEADLINE for voter registration for the November Third GENERAL ELECTION. Don't forget to vote!!!        [address] TO ALL APPOINTMENTS    The diagnoses and medications listed in this after visit summary may not be accurate at the time of check out. Please check MY CHART in 28-48 hours for possible corrections. Late cancellation policy: So that we can better accommodate people who are sick, please give our office 24 hour notice for an appointment cancellation. Thank you. Missed appointments: Your care is very important to us. It is important that you keep your scheduled appointments. Multiple missed appointments will lead to a dismissal from the office. Later arrival policy: If you are more than 10 minutes late for your appointment, you will be asked to reschedule. Please allow 5-7 business days for paperwork to be processed. It is important that you check your MY Chart messages, as they include appointment reminders, test results, and other important information. If you have forgotten your password, please call [phone].

## 2020-06-22 NOTE — PROGRESS NOTES
Patient ID: Dale Holland 1970    . Chief Complaint   Patient presents with    COPD    Hypertension         Leg Pain    Incident onset: gabapentin helps with the leg pain and cramping. also having lots of toe pain from nerve damage from his frostbite (see discharge summary). Treatments tried: Gabapentin. The treatment provided mild (The increase in the gabapentin from the last visit did not help per patient.) relief. Ulcer: On the bottom of his right second toe. His toes been swollen. He thinks some of his other toes are swollen as well. No injury. Review of Systems   Constitutional: Negative for chills, diaphoresis and fever. HENT:        No change in taste or smell sensation   Respiratory: Negative for cough and shortness of breath. Gastrointestinal: Negative for abdominal pain and diarrhea. Musculoskeletal: Negative for myalgias. Neurological: Negative for headaches.        Patient Active Problem List   Diagnosis    Panic attack    Anxiety    Abnormal LFTs    Steatohepatitis    Chronic obstructive pulmonary disease (HCC)    Cramps of lower extremity    Alcoholic hepatitis without ascites    Essential hypertension    Simple chronic bronchitis (HCC)    BETTY [facility]acute kidney injury) ([facility] [address]    Alcohol abuse    Lumbosacral radiculopathy at S1    Acute renal failure (ARF) (HCC)    Severe malnutrition (HCC)       Past Surgical History:   Procedure Laterality Date    FOOT SURGERY      right foot tendon release as a baby    TONSILLECTOMY         Family History   Problem Relation Age of Onset    High Blood Pressure Mother     Other Mother         MS    COPD Mother     COPD Father     Alcohol Abuse Father          47    Tuberculosis Maternal Grandfather        Current Outpatient Medications on File Prior to Visit   Medication Sig Dispense Refill    pantoprazole (PROTONIX) 40 MG tablet Take 1 tablet by mouth daily as needed (acid reflux) 30 tablet 1    lisinopril (PRINIVIL;ZESTRIL) 10 MG tablet Take 1 tablet by mouth daily 30 tablet 0    gabapentin (NEURONTIN) 300 MG capsule Take 1 capsule by mouth 3 times daily for 90 doses. 90 capsule 0    PARoxetine (PAXIL) 20 MG tablet Take 1 tablet by mouth daily 30 tablet 0    tiotropium (SPIRIVA HANDIHALER) 18 MCG inhalation capsule Inhale 1 capsule into the lungs daily 30 capsule 0    carvedilol (COREG) 25 MG tablet Take 1 tablet by mouth 2 times daily (with meals) 60 tablet 3    hydrALAZINE (APRESOLINE) 50 MG tablet Take 1 tablet by mouth every 8 hours 90 tablet 3    ferrous sulfate (IRON 325) 325 (65 Fe) MG tablet Take 1 tablet by mouth 2 times daily (with meals) 30 tablet 3    folic acid (FOLVITE) 1 MG tablet Take 1 tablet by mouth daily 30 tablet 3    vitamin B-1 100 MG tablet Take 1 tablet by mouth daily 30 tablet 3    hydrOXYzine (ATARAX) 50 MG tablet Take 50 mg by mouth 3 times daily as needed for Itching      albuterol sulfate HFA (PROVENTIL HFA) 108 (90 Base) MCG/ACT inhaler Inhale 2 puffs into the lungs every 6 hours as needed for Wheezing 1 Inhaler 5    nicotine (NICODERM CQ) 21 MG/24HR Place 1 patch onto the skin daily (Patient not taking: Reported on 6/22/2020) 30 patch 3     No current facility-administered medications on file prior to visit. Objective:         Physical Exam  Constitutional:       Appearance: He is well-developed. Cardiovascular:      Pulses:           Dorsalis pedis pulses are 2+ on the right side. Musculoskeletal:        Feet:    Psychiatric:         Behavior: Behavior normal.         Thought Content: Thought content normal.         Judgment: Judgment normal.       Vitals:    06/22/20 1121   BP: 122/70   Pulse: 96   Temp: 98.1 °F (36.7 °C)   TempSrc: Temporal   Weight: 194 lb (88 kg)   Height: 6' 2\" (1.88 m)     Body mass index is 24.91 kg/m².      Wt Readings from Last 3 Encounters:   06/22/20 194 lb (88 kg)   06/01/20 193 lb 3.2 oz (87.6 kg)   04/11/20 183 lb 9.6 oz

## 2020-06-23 NOTE — TELEPHONE ENCOUNTER
Nurse called stating the the order for the patient X ray for his foot is wrong. The xray says left foot and it needs to say right foot. Patient is there now getting his xray done.  Can you fix the orders please and thank you

## 2020-06-29 NOTE — TELEPHONE ENCOUNTER
[place].      He may need to re-schedule the Friday appt that was cancelled if there is anything else.

## 2020-06-29 NOTE — TELEPHONE ENCOUNTER
Patient stated when he took the penicillin medication for his infection he has,  it made him feel nauseated.  Patient stated he did not take the medication yesterday or today and he felt fine

## 2020-06-30 NOTE — PROGRESS NOTES
Patient ID: Sujata Doan 1970    Chief Complaint   Patient presents with    Foot Pain         HPI     Toe blister: Here for follow-up. Was not able to tolerate the complete course of doxycycline. It caused him to be nauseated. He still has the blister on his toe. He has not been able to squeeze any pus out of the blister. He does not have a car and walks everywhere. He states it took him about 20 minutes to walk here from the motel that he staying in. He has numbness in his feet    Neuropathy: He did go ahead and double the gabapentin as instructed. The higher dose of gabapentin helped tremendously. He would like to continue taking it. Tobacco use: Quit smoking when he was in the hospital.  Still craves nicotine. He says that the nicotine patches really have helped him to avoid restarting smoking. Would like to continue taking. Review of Systems   Constitutional: Negative for chills, diaphoresis and fever. HENT:        No change in taste or smell sensation   Respiratory: Negative for cough and shortness of breath. Gastrointestinal: Negative for abdominal pain and diarrhea. Musculoskeletal: Negative for myalgias. Neurological: Negative for headaches.        Patient Active Problem List   Diagnosis    Panic attack    Anxiety    Abnormal LFTs    Steatohepatitis    Chronic obstructive pulmonary disease (HCC)    Cramps of lower extremity    Alcoholic hepatitis without ascites    Essential hypertension    Simple chronic bronchitis (HCC)    BETTY (acute kidney injury) ([facility] [address]    Alcohol abuse    Lumbosacral radiculopathy at S1    Acute renal failure (ARF) (HCC)    Severe malnutrition (HCC)       Past Surgical History:   Procedure Laterality Date    FOOT SURGERY      right foot tendon release as a baby    TONSILLECTOMY         Family History   Problem Relation Age of Onset    High Blood Pressure Mother     Other Mother         MS    COPD Mother     COPD Father     Alcohol

## 2020-06-30 NOTE — Clinical Note
Please let patient know that I sent gauze to his pharmacy. He can wrap his toe with a gauze to protect the ulcer. He can apply Vaseline to the wound once daily and then wrapped the gauze around that.

## 2020-10-07 PROBLEM — N17.9 AKI (ACUTE KIDNEY INJURY) (HCC): Status: RESOLVED | Noted: 2017-05-03 | Resolved: 2020-01-01

## 2020-10-07 PROBLEM — E43 SEVERE MALNUTRITION (HCC): Chronic | Status: RESOLVED | Noted: 2020-01-01 | Resolved: 2020-01-01

## 2020-10-07 PROBLEM — N17.9 ACUTE RENAL FAILURE (ARF) (HCC): Status: RESOLVED | Noted: 2020-01-01 | Resolved: 2020-01-01

## 2020-10-07 PROBLEM — F10.920 ACUTE ALCOHOLIC INTOXICATION WITHOUT COMPLICATION (HCC): Status: RESOLVED | Noted: 2020-01-01 | Resolved: 2020-01-01

## 2020-10-07 PROBLEM — F10.920 ACUTE ALCOHOLIC INTOXICATION WITHOUT COMPLICATION (HCC): Status: ACTIVE | Noted: 2020-01-01

## 2020-10-07 NOTE — PATIENT INSTRUCTIONS
Early voting starts October sixth. VOTE VOTE VOTE VOTE VOTE VOTE        PLEASE BRING YOUR MEDICATIONS TO ALL APPOINTMENTS    The diagnoses and medications listed in this after visit summary may not be accurate at the time of check out. Please check MY CHART in 28-48 hours for possible corrections. Late cancellation policy: So that we can better accommodate people who are sick, please give our office 24 hour notice for an appointment cancellation. Thank you. Missed appointments: Your care is very important to us. It is important that you keep your scheduled appointments. Multiple missed appointments will lead to a dismissal from the office. Later arrival policy: If you are more than 10 minutes late for your appointment, you will be asked to reschedule. Please allow 5-7 business days for paperwork to be processed. It is important that you check your MY Chart messages, as they include appointment reminders, test results, and other important information. If you have forgotten your password, please call [phone]. Patient Education        Arch Pain: Exercises  Introduction  Here are some examples of exercises for you to try. The exercises may be suggested for a condition or for rehabilitation. Start each exercise slowly. Ease off the exercises if you start to have pain. You will be told when to start these exercises and which ones will work best for you. How to do the exercises  Plantar fascia stretch   1. Sit in a chair and put your affected foot on your other knee. 2. Hold the heel of your foot in one hand, and grasp your toes with the other hand. 3. Pull on your heel (toward your body), and at the same time pull your toes back with your other hand. 4. You should feel a stretch along the bottom of your foot. 5. Hold 15 to 30 seconds. 6. Repeat 2 to 4 times. Plantar fascia stretch (kneeling)   You may want to place a pillow under your knees for this exercise.   1. Get on your hands and knees on the floor. Keep your heels pointing up and the balls of your feet and your toes on the floor. 2. Slowly sit back toward your ankles. 3. If this is too hard, you can try doing it one leg at a time. Stand up, and then kneel on one knee and keep the other leg forward. Place the foot of your forward leg flat on the ground and bend that knee. The heel on the leg still behind you should point up. The ball and toes of that foot should be on the floor. Sit back toward that ankle. 4. Hold 15 to 30 seconds. 5. Repeat 2 to 4 times. Switch legs if you are doing this one leg at a time. Plantar fascia self-massage   1. Sit in a chair. 2. Place your affected foot on a firm, tube-shaped object, such as a can or water bottle. 3. Roll your foot back and forth over the object to massage the bottom of your foot. 4. If you want to do ice massage, fill a water bottle about three-fourths of the way full and freeze before using. 5. Continue for 2 to 5 minutes. Bilateral calf stretch (knees straight)   1. Place a book on the floor a few inches from a wall or countertop, and put the balls of your feet on it. Your heels should be on the floor. The book needs to be thick enough so that you can feel a gentle stretch in each calf. If you are not steady on your feet, hold on to a chair, counter, or wall while you do this stretch. 2. Keep your knees straight, and lean forward until you feel a stretch in each calf. 3. To get more stretch, add another book or use a thicker book, such as a phone book, a dictionary, or an encyclopedia. 4. Hold the stretch for at least 15 to 30 seconds. 5. Repeat 2 to 4 times. Bilateral calf stretch (knees bent)   1. Place a book on the floor a few inches from a wall or countertop, and put the balls of your feet on it. Your heels should be on the floor. The book needs to be thick enough so that you can feel a gentle stretch in each calf.  If you are not steady on your feet, hold on to a chair, counter, or wall while you do this stretch. 2. Bend your knees, and lean forward until you feel a stretch in each calf. 3. To get more stretch, add another book or use a thicker book, such as a phone book, a dictionary, or an encyclopedia. 4. Hold the stretch for at least 15 to 30 seconds. 5. Repeat 2 to 4 times. [place] pick-ups   1. Put some marbles on the floor next to a cup.  2. Sit down, and use the toes of your affected foot to lift up one marble from the floor at a time. Then try to put the marble in the cup.  3. Repeat 8 to 12 times. Towel scrunches   1. Sit down, and place your affected foot on a towel on the floor. You may also do this with both feet on the towel. 2. Scrunch the towel toward you with your toes. Then use your toes to push the towel back into place. 3. Repeat 8 to 12 times. Heel raises on a step   1. Stand on the bottom step of a staircase, facing up toward the stairs. Put the balls of your feet on the step. If you are not steady on your feet, hold on to the banister or wall. 2. Keeping both knees straight, slowly lift your heels above the step so that you are standing on your toes. Then slowly lower your heels below the step and toward the floor. 3. Return to the starting position, with your feet even with the step. 4. Repeat 8 to 12 times. Follow-up care is a key part of your treatment and safety. Be sure to make and go to all appointments, and call your doctor if you are having problems. It's also a good idea to know your test results and keep a list of the medicines you take. Where can you learn more? Go to https://[employer]. org and sign in to your [employer] account. Enter H119 in the Sisteer box to learn more about \"Arch Pain: Exercises. \"     If you do not have an account, please click on the \"Sign Up Now\" link.   Current as of: March 2, 2020               Content Version: 12.6  © 2174-4727 [employer],

## 2020-10-07 NOTE — PROGRESS NOTES
Patient ID: Jamar Mail 1970    . Chief Complaint   Patient presents with    COPD    Other     Neuropathy    Hypertension    Gastroesophageal Reflux         COPD   There is no cough (no unusual) or shortness of breath (no unusual). The problem occurs rarely. The problem has been unchanged. Pertinent negatives include no fever, headaches, myalgias, rhinorrhea or sore throat. Risk factors for lung disease include smoking/tobacco exposure. His past medical history is significant for COPD. Other   Pertinent negatives include no abdominal pain, arthralgias, chills, coughing (no unusual), fatigue, fever, headaches, myalgias, rash, sore throat or weakness. Gastroesophageal Reflux   He reports no abdominal pain, no coughing (no unusual) or no sore throat. anxiety. Paxil has helped. . This is a recurrent problem. Pertinent negatives include no fatigue. He has tried a PPI for the symptoms. The treatment provided moderate relief. Mental Health Problem   Primary symptoms comment: anxiety. Paxil has helped. . This is a chronic problem. Additional symptoms of the illness do not include fatigue, headaches or abdominal pain. Leg Pain    Incident onset: gabapentin helps with the leg pain and cramping. f toe pain from nerve damage from his frostbite. is getting a little bit better. Foot stiffness: bottom of feet get stiff sometimes. Puts lotion on his fee. Heels dont hurt    Disabililty: brought form for me to sign saying he's disabled    Review of Systems   Constitutional: Negative for chills, fatigue and fever. HENT: Negative for rhinorrhea and sore throat. No change in taste or smell sensation   Eyes: Negative for redness. Respiratory: Negative for cough (no unusual) and shortness of breath (no unusual). Gastrointestinal: Negative for abdominal pain, constipation and diarrhea. Musculoskeletal: Negative for arthralgias, back pain and myalgias. Skin: Negative for rash. Neurological: Negative for weakness and headaches. Hematological: Does not bruise/bleed easily. Patient Active Problem List   Diagnosis    Panic attack    Anxiety    Abnormal LFTs    Steatohepatitis    Chronic obstructive pulmonary disease (HCC)    Cramps of lower extremity    Alcoholic hepatitis without ascites    Essential hypertension    Simple chronic bronchitis (HCC)    Alcohol abuse    Lumbosacral radiculopathy at S1       Past Surgical History:   Procedure Laterality Date    FOOT SURGERY      right foot tendon release as a baby    TONSILLECTOMY         Family History   Problem Relation Age of Onset    High Blood Pressure Mother     Other Mother         MS    COPD Mother     COPD Father     Alcohol Abuse Father          47    Tuberculosis Maternal Grandfather        Current Outpatient Medications on File Prior to Visit   Medication Sig Dispense Refill    pantoprazole (PROTONIX) 40 MG tablet Take 1 tablet by mouth daily as needed (acid reflux) 30 tablet 2    carvedilol (COREG) 25 MG tablet Take 1 tablet by mouth 2 times daily (with meals) 60 tablet 3    hydrALAZINE (APRESOLINE) 50 MG tablet Take 1 tablet by mouth every 8 hours 90 tablet 3    nicotine (NICODERM CQ) 21 MG/24HR Place 1 patch onto the skin daily 30 patch 3    ferrous sulfate (IRON 325) 325 (65 Fe) MG tablet Take 1 tablet by mouth 2 times daily (with meals) 30 tablet 3    folic acid (FOLVITE) 1 MG tablet Take 1 tablet by mouth daily 30 tablet 3    vitamin B-1 100 MG tablet Take 1 tablet by mouth daily 30 tablet 3    nicotine (NICODERM CQ) 7 MG/24HR Place 1 patch onto the skin every 24 hours 30 patch 3    Gauze Pads & Dressings 2\"X2\" PADS 1 each by Does not apply route daily 10 each 0     No current facility-administered medications on file prior to visit. Objective:         Physical Exam  Vitals signs and nursing note reviewed. Constitutional:       Appearance: He is well-developed. HENT:      Head: Normocephalic and atraumatic. Cardiovascular:      Rate and Rhythm: Normal rate and regular rhythm. Heart sounds: Normal heart sounds, S1 normal and S2 normal.   Pulmonary:      Effort: Pulmonary effort is normal. No respiratory distress. Breath sounds: Normal breath sounds. No wheezing or rales. Abdominal:      Palpations: Abdomen is soft. There is no mass. Tenderness: There is no abdominal tenderness. Musculoskeletal:      Right lower leg: No edema. Left lower leg: No edema. Skin:     General: Skin is warm and dry. Neurological:      Mental Status: He is alert. Psychiatric:         Speech: Speech normal.         Behavior: Behavior normal.         Thought Content: Thought content normal.         Judgment: Judgment normal.       Vitals:    10/07/20 0925   BP: 132/84   Pulse: 105   Temp: 97 °F (36.1 °C)   TempSrc: Temporal   SpO2: 96%   Weight: 184 lb 12.8 oz (83.8 kg)   Height: 6' 2\" (1.88 m)     Body mass index is 23.73 kg/m². Wt Readings from Last 3 Encounters:   10/07/20 184 lb 12.8 oz (83.8 kg)   06/30/20 189 lb 12.8 oz (86.1 kg)   06/22/20 194 lb (88 kg)     BP Readings from Last 3 Encounters:   10/07/20 132/84   06/30/20 120/80   06/22/20 122/70          No results found for this visit on 10/07/20. The ASCVD Risk score (Bull Bui, et al., 2013) failed to calculate for the following reasons: The valid total cholesterol range is 130 to 320 mg/dL  Lab Review   No results displayed because visit has over 200 results. Assessment:       Diagnosis Orders   1. Essential hypertension  lisinopril (PRINIVIL;ZESTRIL) 10 MG tablet   2. Needs flu shot  INFLUENZA, QUADV, 3 YRS AND OLDER, IM PF, PREFILL SYR OR SDV, 0.5ML (AFLURIA QUADV, PF)   3. History of frostbite  gabapentin (NEURONTIN) 600 MG tablet   4. Lumbosacral radiculopathy at S1  gabapentin (NEURONTIN) 600 MG tablet   5.  Anxiety  PARoxetine (PAXIL) 20 MG tablet    hydrOXYzine (ATARAX) 25 MG tablet   6. Chronic obstructive pulmonary disease, unspecified COPD type (HCC)  tiotropium (SPIRIVA HANDIHALER) 18 MCG inhalation capsule    albuterol sulfate HFA (PROVENTIL HFA) 108 (90 Base) MCG/ACT inhaler           Plan:      Decreasing the gabapentin from 3 times per day to 2 times per day    COPD and anxiety doing well. 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

## 2020-10-24 PROBLEM — F10.939 ALCOHOL WITHDRAWAL WITHOUT PERCEPTUAL DISTURBANCES WITH COMPLICATION (HCC): Status: ACTIVE | Noted: 2020-01-01

## 2020-10-24 NOTE — ED NOTES
Report called to [facility] on 4N. Pt will be transferred to room 4126 when room is cleaned, in process of being cleaned at this time. No changes noted to previous patient assessment noted.  Pt sitting up in bed watching TV with respirations even and unlabored     La Blue RN  10/24/20 7336

## 2020-10-24 NOTE — ED NOTES
Dinner tray ordered for patient as requested. Pt updated on plan of care.  Waiting on room for admission to be cleaned     Greg Hess RN  10/24/20 1954

## 2020-10-24 NOTE — ACP (ADVANCE CARE PLANNING)
Advance Care Planning     Advance Care Planning Activator (Inpatient)  Conversation Note      Date of ACP Conversation: 10/24/2020    Conversation Conducted with: Patient with Decision Making Capacity    ACP Activator: Emylious Box    *When Decision Maker makes decisions on behalf of the incapacitated patient: Decision Maker is asked to consider and make decisions based on patient values, known preferences, or best interests. Health Care Decision Maker:     Current Designated Health Care Decision Maker:   Primary Decision Maker: Leopoldo Grater - [facility] - [phone]  (If there is a [address] named in the [address]\" box in the ACP activity, but it is not visible above, be sure to open that field and then select the health care decision maker relationship (ie \"primary\") in the blank space to the right of the name.) Validate  this information as still accurate & up-to-date; edit [place] field as needed.)    Note: Assess and validate information in current ACP documents, as indicated. Care Preferences    Ventilation: \"If you were in your present state of health and suddenly became very ill and were unable to breathe on your own, what would your preference be about the use of a ventilator (breathing machine) if it were available to you? \"      Would the patient desire the use of ventilator (breathing machine)?: yes    \"If your health worsens and it becomes clear that your chance of recovery is unlikely, what would your preference be about the use of a ventilator (breathing machine) if it were available to you? \"     Would the patient desire the use of ventilator (breathing machine)?: Yes      Resuscitation  \"CPR works best to restart the heart when there is a sudden event, like a heart attack, in someone who is otherwise healthy.  Unfortunately, CPR does not typically restart the heart for people who have serious health conditions or who are very sick.\"    \"In the event your heart stopped as a result of an underlying serious health condition, would you want attempts to be made to restart your heart (answer \"yes\" for attempt to resuscitate) or would you prefer a natural death (answer \"no\" for do not attempt to resuscitate)? \" yes      NOTE: If the patient has a valid advance directive AND now provides care preference(s) that are inconsistent with that prior directive, advise the patient to consider either: creating a new advance directive that complies with state-specific requirements; or, if that is not possible, orally revoking that prior directive in accordance with state-specific requirements, which must be documented in the EHR. [] Yes   [] No   Educated Patient / Mo José regarding differences between Advance Directives and portable DNR orders. Length of ACP Conversation in minutes:  17    Conversation Outcomes:  [x] ACP discussion completed  [] Existing advance directive reviewed with patient; no changes to patient's previously recorded wishes  [] New Advance Directive completed  [] Portable Do Not Rescitate prepared for Provider review and signature  [] POLST/POST/MOLST/MOST prepared for Provider review and signature      Follow-up plan:    [] Schedule follow-up conversation to continue planning  [] Referred individual to Provider for additional questions/concerns   [] Advised patient/agent/surrogate to review completed ACP document and update if needed with changes in condition, patient preferences or care setting    [x] This note routed to one or more involved healthcare providers      LSW complete POA w/pt, naming his mother as his agent. He does have a 27 yo Dtr and 33 yo Dtr but has no contact w/them.

## 2020-10-24 NOTE — ED TRIAGE NOTES
Patient to rm 31 via EMS with c/o possible confusion. EMS was called because his friends felt like he may have been confused. Patient is currently A & O x 4. Patient states he did fall and hit head approx. 3 days ago. States he is now having right rib pain. Resps even and unlabored.

## 2020-10-24 NOTE — ED NOTES
Pt returned to room from [address] scan      Kayli Samaniego RN  10/24/20 9021

## 2020-10-24 NOTE — ED NOTES
Peripheral IV inserted into right AC, blood return noted, flushed with ease, pt tolerated well.       John Barker RN  10/24/20 7441

## 2020-10-24 NOTE — ED PROVIDER NOTES
Emergency Department Encounter    Patient: Gavino Martinez  MRN: [id number]  : 1970  Date of Evaluation: 10/24/2020  ED Provider:  Cheryle Mae    Triage Chief Complaint:   Altered Mental Status    [place]:  Gavino Martinez is a 44 y.o. male that presents with reported confusion. EMS brought the patient in. He reports he had been discharged from a skilled nursing facility in May and has been living at [facility] in order to get back into the homeless program.  He states that the people that called 911 know him and that he is not acting normally. EMS got a report that he had been confused and that he fell and hit his head on Wednesday and was hallucinating today. He stopped drinking alcohol in January. He states that he is not having any concerns today other than some right rib pain from when he fell on Wednesday. He was stepping over something and caught his foot, fell and landed on his right side and struck his right forehead on the ground. He had a black eye. That improved. No loss of consciousness. No neck or back pain. No chest pain other than when he takes a deep breath on the right side. No abdominal pain. No nausea or vomiting. No diarrhea. No dysuria or hematuria. No urinary frequency. He denies drug use and alcohol use. He states that he is always a bit \"off the wall\". States that is just his personality. Denies hallucinations. He denies suicidal and homicidal ideation. He denies other concerns at this time. He is oriented to himself, time, place. ROS - see HPI, below listed is current ROS at time of my eval:  10 systems reviewed and negative except as above.        Past Medical History:   Diagnosis Date    COPD (chronic obstructive pulmonary disease) ([facility] [address] 2013    History of alcohol abuse     Quit early 2012    History of idiopathic seizure 2012    Hypertension     Panic attack      Past Surgical History:   Procedure Laterality Date    FOOT SURGERY      right foot tendon release as a baby    TONSILLECTOMY       Family History   Problem Relation Age of Onset    High Blood Pressure Mother     Other Mother         MS    COPD Mother    Ardyth Moritz COPD Father     Alcohol Abuse Father          47    Tuberculosis Maternal Grandfather      Social History     Socioeconomic History    Marital status:       Spouse name: Not on file    Number of children: Not on file    Years of education: Not on file    Highest education level: Not on file   Occupational History    Not on file   Social Needs    Financial resource strain: Not on file    Food insecurity     Worry: Not on file     Inability: Not on file    Transportation needs     Medical: Not on file     Non-medical: Not on file   Tobacco Use    Smoking status: Former Smoker     Packs/day: 0.50     Types: Cigarettes     Start date: 2015    Smokeless tobacco: Never Used   Substance and Sexual Activity    Alcohol use: Yes     Comment: states stopped drinking in December here and there    Drug use: No    Sexual activity: Not Currently   Lifestyle    Physical activity     Days per week: Not on file     Minutes per session: Not on file    Stress: Not on file   Relationships    Social connections     Talks on phone: Not on file     Gets together: Not on file     Attends Buddhist service: Not on file     Active member of club or organization: Not on file     Attends meetings of clubs or organizations: Not on file     Relationship status: Not on file    Intimate partner violence     Fear of current or ex partner: Not on file     Emotionally abused: Not on file     Physically abused: Not on file     Forced sexual activity: Not on file   Other Topics Concern    Not on file   Social History Narrative    Not on file     Current Facility-Administered Medications   Medication Dose Route Frequency Provider Last Rate Last Dose    potassium chloride (KLOR-CON M) extended release tablet 40 mEq  40 mEq Oral MIHAIN Desire Vázquez Humberto Kruger MD        Or    potassium bicarb-citric acid (EFFER-K) effervescent tablet 40 mEq  40 mEq Oral PRN Carolee Hoff MD        Or    potassium chloride 10 mEq/100 mL IVPB (Peripheral Line)  10 mEq Intravenous PRN Carolee Hoff MD        sodium chloride flush 0.9 % injection 10 mL  10 mL Intravenous 2 times per day Carolee Hoff MD        sodium chloride flush 0.9 % injection 10 mL  10 mL Intravenous PRN Carolee Hoff MD        thiamine (B-1) injection 100 mg  100 mg Intravenous Daily Carolee Hoff MD   100 mg at 10/24/20 1607    LORazepam (ATIVAN) tablet 1 mg  1 mg Oral Q1H PRN Carolee Hoff MD        Or    LORazepam (ATIVAN) injection 1 mg  1 mg Intravenous Q1H PRN Carolee Hoff MD        Or    LORazepam (ATIVAN) tablet 2 mg  2 mg Oral Q1H PRN Carolee Hoff MD        Or    LORazepam (ATIVAN) injection 2 mg  2 mg Intravenous Q1H PRN Carolee Hoff MD        Or    LORazepam (ATIVAN) tablet 3 mg  3 mg Oral Q1H PRN Carolee Hoff MD        Or    LORazepam (ATIVAN) injection 3 mg  3 mg Intravenous Q1H PRN Carolee Hoff MD        Or    LORazepam (ATIVAN) tablet 4 mg  4 mg Oral Q1H PRN Carolee Hoff MD        Or    LORazepam (ATIVAN) injection 4 mg  4 mg Intravenous Q1H PRN Carolee Hoff MD         Current Outpatient Medications   Medication Sig Dispense Refill    gabapentin (NEURONTIN) 600 MG tablet Take 1 tablet by mouth 2 times daily for 90 doses.  60 tablet 5    lisinopril (PRINIVIL;ZESTRIL) 10 MG tablet Take 1 tablet by mouth daily 30 tablet 5    PARoxetine (PAXIL) 20 MG tablet Take 1 tablet by mouth daily 30 tablet 5    tiotropium (SPIRIVA HANDIHALER) 18 MCG inhalation capsule Inhale 1 capsule into the lungs daily 30 capsule 5    albuterol sulfate HFA (PROVENTIL HFA) 108 (90 Base) MCG/ACT inhaler Inhale 2 puffs into the lungs every 6 hours as needed for Wheezing 1 Inhaler 1    hydrOXYzine (ATARAX) 25 MG tablet Take 1 tablet by mouth 2 times daily as needed for Itching or Anxiety 60 tablet 5    pantoprazole (PROTONIX) 40 MG tablet Take 1 tablet by mouth daily as needed (acid reflux) 30 tablet 2    nicotine (NICODERM CQ) 7 MG/24HR Place 1 patch onto the skin every 24 hours 30 patch 3    Gauze Pads & Dressings 2\"X2\" PADS 1 each by Does not apply route daily 10 each 0    carvedilol (COREG) 25 MG tablet Take 1 tablet by mouth 2 times daily (with meals) 60 tablet 3    hydrALAZINE (APRESOLINE) 50 MG tablet Take 1 tablet by mouth every 8 hours 90 tablet 3    nicotine (NICODERM CQ) 21 MG/24HR Place 1 patch onto the skin daily 30 patch 3    ferrous sulfate (IRON 325) 325 (65 Fe) MG tablet Take 1 tablet by mouth 2 times daily (with meals) 30 tablet 3    folic acid (FOLVITE) 1 MG tablet Take 1 tablet by mouth daily 30 tablet 3    vitamin B-1 100 MG tablet Take 1 tablet by mouth daily 30 tablet 3     No Known Allergies    Nursing Notes Reviewed    Physical Exam:  ED Triage Vitals [10/24/20 1356]   Enc Vitals Group      BP (!) 158/101      Pulse 111      Resp 24      Temp 98.3 °F (36.8 °C)      Temp Source Oral      SpO2 98 %      Weight 159 lb (72.1 kg)      Height 6' 2\" (1.88 m)      Head Circumference       Peak Flow       Pain Score       Pain Loc       Pain Edu? Excl. in [address]? My pulse ox interpretation is - normal    General appearance:  No acute distress. Skin:  Warm. Dry. Eye:  Extraocular movements intact. Ears, nose, mouth and throat:  Oral mucosa moist   Neck:  Trachea midline. Extremity:  No swelling. Normal ROM     Heart:  Regular rate and rhythm, normal S1 & S2, no extra heart sounds. Perfusion:  intact  Respiratory:  Lungs clear to auscultation bilaterally. Respirations nonlabored. Abdominal:   Soft. Nontender. Non distended. Neurological:  Alert and oriented times 3.   Cranial nerves II through XII grossly intact, strength 5 out of 5 in bilateral upper and lower extremities, sensation intact light touch in bilateral upper and lower extremities. Finger-nose without ataxia. No truncal ataxia with sitting in the bed.            Psychiatric:  Appropriate    I have reviewed and interpreted all of the currently available lab results from this visit (if applicable):  Results for orders placed or performed during the hospital encounter of 10/24/20   CBC Auto Differential   Result Value Ref Range    WBC 7.1 4.0 - 10.5 K/CU MM    RBC 3.56 (L) 4.6 - 6.2 M/CU MM    Hemoglobin 11.9 (L) 13.5 - 18.0 GM/DL    Hematocrit 36.0 (L) 42 - 52 %    .1 (H) 78 - 100 FL    MCH 33.4 (H) 27 - 31 PG    MCHC 33.1 32.0 - 36.0 %    RDW 13.8 11.7 - 14.9 %    Platelets 290 990 - 074 K/CU MM    MPV 10.5 7.5 - 11.1 FL    Differential Type AUTOMATED DIFFERENTIAL     Segs Relative 73.4 (H) 36 - 66 %    Lymphocytes % 12.0 (L) 24 - 44 %    Monocytes % 10.6 (H) 0 - 4 %    Eosinophils % 2.8 0 - 3 %    Basophils % 0.8 0 - 1 %    Segs Absolute 5.2 K/CU MM    Lymphocytes Absolute 0.9 K/CU MM    Monocytes Absolute 0.8 K/CU MM    Eosinophils Absolute 0.2 K/CU MM    Basophils Absolute 0.1 K/CU MM    Nucleated RBC % 0.0 %    Total Nucleated RBC 0.0 K/CU MM    Total Immature Neutrophil 0.03 K/CU MM    Immature Neutrophil % 0.4 0 - 0.43 %   Comprehensive Metabolic Panel   Result Value Ref Range    Sodium 135 135 - 145 MMOL/L    Potassium 2.9 (LL) 3.5 - 5.1 MMOL/L    Chloride 93 (L) 99 - 110 mMol/L    CO2 25 21 - 32 MMOL/L    BUN 16 6 - 23 MG/DL    CREATININE 1.1 0.9 - 1.3 MG/DL    Glucose 88 70 - 99 MG/DL    Calcium 8.5 8.3 - 10.6 MG/DL    Alb 4.3 3.4 - 5.0 GM/DL    Total Protein 6.4 6.4 - 8.2 GM/DL    Total Bilirubin 2.2 (H) 0.0 - 1.0 MG/DL     (H) 10 - 40 U/L     (H) 15 - 37 IU/L    Alkaline Phosphatase 79 40 - 129 IU/L    GFR Non-African American >60 >60 mL/min/1.73m2    GFR African American >60 >60 mL/min/1.73m2    Anion Gap 17 (H) 4 - 16   Troponin   Result Value Ref Range    Troponin T <0.010 <0.01 NG/ML   POC Blood Glucose   Result Value Ref Range    Glucose 84 mg/dL    QC OK? yes    POCT Glucose   Result Value Ref Range    POC Glucose 84 70 - 99 MG/DL   EKG 12 Lead   Result Value Ref Range    Ventricular Rate 84 BPM    Atrial Rate 84 BPM    P-R Interval 132 ms    QRS Duration 96 ms    Q-T Interval 402 ms    QTc Calculation (Bazett) 475 ms    P Axis 28 degrees    R Axis 33 degrees    T Axis 53 degrees    Diagnosis       Normal sinus rhythm  Voltage criteria for left ventricular hypertrophy  Nonspecific ST and T wave abnormality  Abnormal ECG  When compared with ECG of 01-APR-2020 14:16,  ST now depressed in Lateral leads        Radiographs (if obtained):  Radiologist's Report Reviewed:  No results found. EKG (if obtained): (All EKG's are interpreted by myself in the absence of a cardiologist)  Normal sinus rhythm with rate of 84 bpm, normal intervals. No ST elevation. Nonspecific ST and T wave changes. No previous compare      MDM:  25-year-old male presents with concern for confusion. Report to EMS was that he was confused, had fallen 3 days ago and was hallucinating. He is alert and oriented here and denies all complaints other than the fall from 3 days ago. He is tachycardic and hypertensive, denies drinking to me but I would be concerned this could be withdrawal. CT head ordered given the report of fall and struck his head, CXR ordered, labs ordered. He is comfortable with workup at this time. Patient's labs are concerning for hypokalemia, elevated LFTs- despite telling me he is not drinking and not having any pain, abdominal exam unremarkable for me. HR elevated, hypertension, anion gap elevated. He has mild tremor on my re-exam of him, I suspect he is having withdrawal symptoms. When I asked him about this he does admit that he occasionally will relapse and have a few drinks. He did tell me he started drinking again a couple of months ago. Denies drinking in the last few days.  We will start CIWA protocol as I am concerned could be having some

## 2020-10-24 NOTE — ED NOTES
Urinal provided and instructed pt on need for sample when able to provide one.  Pt voiced understanding     Rocky Silva RN  10/24/20 0864

## 2020-10-24 NOTE — ED NOTES
Bedside report given to [facility] and care transferred at this time     Austin Bullard, RN  10/24/20 1934

## 2020-10-24 NOTE — CARE COORDINATION
LSW was consulted to assist this pt with discharge planning. Pt here today via EMS because his friends felt like he may have been confused. Patient is currently [facility] x 4. Patient states he did fall and hit head approx. 3 days ago. States he is now having right rib pain. CT of head is negative and CXR notes rib fx. Pt was discharged from [address] in May and has been living at [address] via [email or web address]. Pt reported to Dr. Tyra Hooks that he stopped drinking alcohol in January. LSW spoke to this pt and explained CM role. Pt reports [facility] from [place] and [facility] there are working w/him to get his own apartment since may 1st, when he was d/c'd from [address]. Pt states he has been sober except for Thursday when he drank a glass of vodka and a month ago he did the same. Pt does not endorse any w/drawl symptoms. In the past when he was drinking and went through w/drawl he had N/V (very sick), tremors and sweating. Pt stated he was in [facility] x2 , had a sponsor and use to go to [facility]. He now has no transportation so he cannot go to [facility]. Pt states he is here today d/t hallucinations. He saw bugs on the wall and no one else did and he saw birds in the fields which no one else saw either. Pt explained his wife  2020 and this has been hard for him. Pt also reports he uses inhalers and use to use a nebulizer. Pt does have insurance and can afford his medications. Pt has transportation via insurance. Pt does have a PCP. Pt has the following DME: [employer]. LSW completed ACP w/pt. He wanted to complete POA naming his mother as his agent. He does have a 29 yo Dtr and 33 yo Dtr but has no contact w/them. LSW spoke to Dr. Tyra Hooks about pt and above information and she noted d/t elevated liver enzymes, hypokalemia, anion gap elevated, tachycardia, pt being hypertensive and tremors she witnessed- pt is requiring admission for ETOH w/drawl.  She noted pt changed his story w/her concerning his alcohol use and he definitely has had confusion since his fall. This, coupled w/the hallucinations, is indication further of admission.

## 2020-10-24 NOTE — H&P
History and Physical      Name:  Sarthak Chandra /Age/Sex: 1970  (44 y.o. male)   MRN & CSN:  [id number] & [id number] Admission Date/Time: 10/24/2020  1:51 PM   Location:  [facility] PCP: Jay Rebolledo MD       Discussed patient with Dr. Chase Rodarte and Plan:     Sarthak Chandra is a 44 y.o.  male  who presents with alcohol withdrawal    - Alcohol withdrawal  Last drink 2-3 days ago  CIWA score 9  Place on CIWA protocol  Seizure precautions  Banana bag q day x 3 doses  Tele   consult for rehab  UDS pending    - Hypokalemia  K 2.9  Check mg  K replacement protocol  Recheck in AM    - Transaminitis   (baseline wnl)   (baseline wnl)  Likely related to recent binge-drinking  Holding on CT scan  Check LFTs in AM    - Left upper quadrant abdominal pain  Has acute Right 9th lateral rib fracture  Reports fall 2 days ago  No pain unless coughing/sneezing/palpating  Denies n/v/d    - Tobacco abuse  Cessation education  Nicotine patch      Chronic  - HTN- Cont bb, lisinopril  - COPD- not in exacerbation; not o2 dependent; cont home regimen  - AOCD- Denies s/sx of bleeding; 2/2 alcohol use; hgb 12; cont PO iron  - Depression- Cont paxil    Discussed patient with ER physician     Diet GEN    DVT Prophylaxis [x] Lovenox, []  Heparin, [] SCDs, [] Ambulation   GI Prophylaxis [x] PPI,  [] H2 Blocker,  [] Carafate,  [] Diet/Tube Feeds   Code Status Full   Disposition Patient requires continued admission due to alcohol withdrawal   MDM [] Low, [x] Moderate,[]  High  Patient's risk as above due to      [] One or more chronic illnesses with exacerbation progression      [x] Two or more stable chronic illnesses      [x] Undiagnosed new problem with uncertain prognosis      [] Elective major surgery      []Prescription drug management       History of Present Illness:     Chief Complaint: alcohol withdrawal    Sarthak Chandra is a 44 y.o.  male  who presents with the above complaints, onset this morning. Context is, patient reports lengthy alcohol abuse. States he had been sober most of the year, trying to get back into a program for the homeless. Reports binge drinking over 1 day 2-3 days ago. Reports he had visual hallucinations earlier today. Has mild tremors at time of exam.  Reports falling in intoxicated state 2 days ago, landing on Left knee and Rt side with immediate pain to Rt lateral rib area. Denies SOB, n/v/d. States he has no pain unless he cough/sneezes. Denies illicit drug use. Denies hx of seizures. Denies chest pain/pressure, sob, abdominal pain, back pain, n/v, diarrhea. Ten point ROS reviewed negative, unless as noted above    Objective:     No intake or output data in the 24 hours ending 10/24/20 1703     Vitals:   Vitals:    10/24/20 1447   BP: (!) 165/101   Pulse: 102   Resp: 20   Temp: 98.3   SpO2: 98%       Physical Exam:     GEN Awake male, sitting upright in bed in no apparent distress. Appears given age. EYES Pupils are equally round. No scleral erythema, discharge, or conjunctivitis. HENT Mucous membranes are moist. Oral pharynx without exudates, no evidence of thrush. NECK Supple, no apparent thyromegaly or masses. RESP Clear to auscultation, no wheezes, rales or rhonchi. Symmetric chest movement while on room air. CARDIO/VASC S1/S2 auscultated. Regular rate without appreciable murmurs, rubs, or gallops. No JVD or carotid bruits. Peripheral pulses equal bilaterally and palpable. No peripheral edema. GI Abdomen is soft without significant tenderness, masses, or guarding. Bowel sounds are normoactive. Rectal exam deferred.  No costovertebral angle tenderness. Knox catheter is not present. HEME/LYMPH No palpable cervical lymphadenopathy and no hepatosplenomegaly. No petechiae or ecchymoses. MSK No gross joint deformities. Strength equal in BLE and BUE  SKIN Lt knee with scab. Normal coloration, warm, dry.   NEURO Cranial nerves appear grossly intact, normal speech, no lateralizing weakness. Tremors present  PSYCH Awake, alert, oriented x 3. Appears anxious    Past Medical History:        Past Medical History:   Diagnosis Date    COPD (chronic obstructive pulmonary disease) ([facility] [address] 4/2/2013    History of alcohol abuse     Quit early 9/2012    History of idiopathic seizure 11/2012    Hypertension     Panic attack        PSHX:  has a past surgical history that includes Foot surgery and Tonsillectomy. Allergies: No Known Allergies    FAM HX: family history includes Alcohol Abuse in his father; COPD in his father and mother; High Blood Pressure in his mother; Other in his mother; Tuberculosis in his maternal grandfather. Soc HX:   Social History     Socioeconomic History    Marital status:       Spouse name: None    Number of children: None    Years of education: None    Highest education level: None   Occupational History    None   Social Needs    Financial resource strain: None    Food insecurity     Worry: None     Inability: None    Transportation needs     Medical: None     Non-medical: None   Tobacco Use    Smoking status: Former Smoker     Packs/day: 0.50     Types: Cigarettes     Start date: 4/30/2015    Smokeless tobacco: Never Used   Substance and Sexual Activity    Alcohol use: Yes     Comment: states stopped drinking in December here and there    Drug use: No    Sexual activity: Not Currently   Lifestyle    Physical activity     Days per week: None     Minutes per session: None    Stress: None   Relationships    Social connections     Talks on phone: None     Gets together: None     Attends Mu-ism service: None     Active member of club or organization: None     Attends meetings of clubs or organizations: None     Relationship status: None    Intimate partner violence     Fear of current or ex partner: None     Emotionally abused: None     Physically abused: None     Forced sexual activity: None   Other Topics Concern  None   Social History Narrative    None       Medications:     Medications:    sodium chloride flush  10 mL Intravenous 2 times per day      gabapentin (NEURONTIN) 600 MG tablet  Take 1 tablet by mouth 2 times daily for 90 doses.   Jocy Garcia MD  Needs Review    lisinopril (PRINIVIL;ZESTRIL) 10 MG tablet  Take 1 tablet by mouth daily  Jocy Garcia MD  Needs Review    PARoxetine (PAXIL) 20 MG tablet  Take 1 tablet by mouth daily  Jocy Garcia MD  Needs Review    tiotropium (Zada Nipple) 18 MCG inhalation capsule  Inhale 1 capsule into the lungs daily  Jocy Garcia MD  Needs Review    albuterol sulfate HFA (PROVENTIL HFA) 108 (90 Base) MCG/ACT inhaler  Inhale 2 puffs into the lungs every 6 hours as needed for Wheezing  Jocy Garcia MD  Needs Review    hydrOXYzine (ATARAX) 25 MG tablet  Take 1 tablet by mouth 2 times daily as needed for Itching or Anxiety  Jocy Garcia MD  Needs Review    pantoprazole (PROTONIX) 40 MG tablet  Take 1 tablet by mouth daily as needed (acid reflux)  Jocy Garcia MD  Needs Review    nicotine (NICODERM CQ) 7 MG/24HR  Place 1 patch onto the skin every 24 hours  Jocy Garcia MD  Needs Review    Gauze Pads & Dressings 2\"X2\" PADS  1 each by Does not apply route daily  Jocy Garcia MD  Needs Review    carvedilol (COREG) 25 MG tablet  Take 1 tablet by mouth 2 times daily (with meals)  Sally Fontana MD  Needs Review    hydrALAZINE (APRESOLINE) 50 MG tablet  Take 1 tablet by mouth every 8 hours  Sally Fontana MD  Needs Review    nicotine (NICODERM CQ) 21 MG/24HR  Place 1 patch onto the skin daily  Sally Fontana MD  Needs Review    ferrous sulfate (IRON 325) 325 (65 Fe) MG tablet  Take 1 tablet by mouth 2 times daily (with meals)  Sally Fontana MD  Needs Review    folic acid (FOLVITE) 1 MG tablet  Take 1 tablet by mouth daily  Sally Fontana MD  Needs Review    vitamin B-1 100 MG tablet  Take 1 tablet by mouth daily  Sally Fontana MD  Needs Review Data:     XR CHEST PORTABLE [[id number]]  Collected: 10/24/20 1534        Order Status: Completed  Updated: 10/24/20 1539       Narrative:         EXAMINATION:   ONE XRAY VIEW OF THE CHEST     10/24/2020 2:07 pm     COMPARISON:   04/08/2020     HISTORY:   ORDERING SYSTEM PROVIDED HISTORY: right rib pain after fall three days ago   TECHNOLOGIST PROVIDED HISTORY:   Reason for exam:->right rib pain after fall three days ago   Reason for Exam: right rib pain after fall three days ago   Acuity: Acute   Type of Exam: Initial     FINDINGS:   There is an acute minimally displaced right lateral 9th rib fracture.  No   focal consolidation, pleural effusion or pneumothorax.  The cardiomediastinal   silhouette is stable.  No overt pulmonary edema.  The osseous structures are   otherwise stable.       Impression:         1. Acute minimally displaced right lateral 9th rib fracture. 2. No acute cardiopulmonary findings.       CT HEAD WO CONTRAST [[id number]]  Collected: 10/24/20 1429       Order Status: Completed  Updated: 10/24/20 1455       Narrative:         EXAMINATION:   CT OF THE HEAD WITHOUT CONTRAST,  10/24/2020 2:03 pm     TECHNIQUE:   CT of the head was performed without the administration of intravenous   contrast. Dose modulation, iterative reconstruction, and/or weight based   adjustment of the mA/kV was utilized to reduce the radiation dose to as low   as reasonably achievable. COMPARISON:   03/16/2020     HISTORY:   ORDERING SYSTEM PROVIDED HISTORY: Fall three days ago, acting confused per   friends. TECHNOLOGIST PROVIDED HISTORY:   Has a \"code stroke\" or \"stroke alert\" been called? ->No   Reason for exam:-> Fall three days ago, acting confused per friends. Reason for Exam: Fall three days ago, acting confused, left-sided head pain.      FINDINGS:   BRAIN/VENTRICLES: There is no acute intracranial hemorrhage, mass effect or   midline shift.  No abnormal extra-axial fluid collection.  The gray-white differentiation is maintained. Marnie Abhi is no evidence of hydrocephalus. ORBITS: The visualized portion of the orbits demonstrate no acute abnormality. SINUSES: The visualized paranasal sinuses and mastoid air cells demonstrate   no acute abnormality.  Old left medial orbital wall fracture.      SOFT TISSUES/SKULL:  No acute abnormality of the visualized skull or soft   tissues.       Impression:         No acute intracranial abnormality.           Electronically signed by KEREN Cervantes NP on 10/24/2020 at 5:03 PM

## 2020-10-25 NOTE — CARE COORDINATION
Met c pt to continue discharge planning. Offered pt treatment resources but pt politely declined stating he is familiar with [facility] and where the local AA/NA meetings are (he states he prefers NA meetings though not addicted to narcotics). Pt states he is still living at the [facility] through the [facility] and they are working on getting him into an apartment. Pt states he was evicted from his previous apartment after his wife  in January. Pt states this has been a significantly hard year for him. Pt states he may need a ride home, if so can authorize the use of [employer].

## 2020-10-25 NOTE — PLAN OF CARE
Injury:  Goal: Will remain free from falls  Description: Will remain free from falls  10/25/2020 1947 by Nina Ferguson RN  Outcome: Ongoing     Problem: Injury - Risk of, Physical Injury:  Goal: Absence of physical injury  Description: Absence of physical injury  10/25/2020 1947 by Nina Ferguson RN  Outcome: Ongoing     Problem: Mood - Altered:  Goal: Mood stable  Description: Mood stable  10/25/2020 1947 by Nina Ferguson RN  Outcome: Ongoing     Problem: Mood - Altered:  Goal: Absence of abusive behavior  Description: Absence of abusive behavior  10/25/2020 1947 by Nina Ferguson RN  Outcome: Ongoing     Problem: Mood - Altered:  Goal: Verbalizations of feeling emotionally comfortable while being cared for will increase  Description: Verbalizations of feeling emotionally comfortable while being cared for will increase  10/25/2020 1947 by Nina Ferguson RN  Outcome: Ongoing     Problem: Psychomotor Activity - Altered:  Goal: Absence of psychomotor disturbance signs and symptoms  Description: Absence of psychomotor disturbance signs and symptoms  10/25/2020 1947 by Nina Ferguson RN  Outcome: Ongoing     Problem: Sensory Perception - Impaired:  Goal: Demonstrations of improved sensory functioning will increase  Description: Demonstrations of improved sensory functioning will increase  10/25/2020 1947 by Nina Ferguson RN  Outcome: Ongoing     Problem: Sensory Perception - Impaired:  Goal: Decrease in sensory misperception frequency  Description: Decrease in sensory misperception frequency  10/25/2020 1947 by Nina Ferguson RN  Outcome: Ongoing     Problem: Sensory Perception - Impaired:  Goal: Able to refrain from responding to false sensory perceptions  Description: Able to refrain from responding to false sensory perceptions  10/25/2020 1947 by Nina Ferguson RN  Outcome: Ongoing     Problem: Sensory Perception - Impaired:  Goal: Demonstrates accurate environmental perceptions  Description: Demonstrates accurate environmental perceptions  10/25/2020 1947 by Ezra High RN  Outcome: Ongoing     Problem: Sensory Perception - Impaired:  Goal: Able to distinguish between reality-based and nonreality-based thinking  Description: Able to distinguish between reality-based and nonreality-based thinking  10/25/2020 1947 by Ezra High RN  Outcome: Ongoing     Problem: Sensory Perception - Impaired:  Goal: Able to interrupt nonreality-based thinking  Description: Able to interrupt nonreality-based thinking  10/25/2020 1947 by Ezra High RN  Outcome: Ongoing     Problem: Sleep Pattern Disturbance:  Goal: Appears well-rested  Description: Appears well-rested  10/25/2020 1947 by Ezra High RN  Outcome: Ongoing

## 2020-10-25 NOTE — PLAN OF CARE
Problem: Pain:  Description: Pain management should include both nonpharmacologic and pharmacologic interventions.   Goal: Pain level will decrease  Description: Pain level will decrease  Outcome: Ongoing  Goal: Control of acute pain  Description: Control of acute pain  Outcome: Ongoing  Goal: Control of chronic pain  Description: Control of chronic pain  Outcome: Ongoing     Problem: Falls - Risk of:  Goal: Will remain free from falls  Description: Will remain free from falls  Outcome: Ongoing  Goal: Absence of physical injury  Description: Absence of physical injury  Outcome: Ongoing     Problem: Confusion - Acute:  Goal: Absence of continued neurological deterioration signs and symptoms  Description: Absence of continued neurological deterioration signs and symptoms  Outcome: Ongoing  Goal: Mental status will be restored to baseline  Description: Mental status will be restored to baseline  Outcome: Ongoing     Problem: Discharge Planning:  Goal: Ability to perform activities of daily living will improve  Description: Ability to perform activities of daily living will improve  Outcome: Ongoing  Goal: Participates in care planning  Description: Participates in care planning  Outcome: Ongoing     Problem: Injury - Risk of, Physical Injury:  Goal: Will remain free from falls  Description: Will remain free from falls  Outcome: Ongoing  Goal: Absence of physical injury  Description: Absence of physical injury  Outcome: Ongoing     Problem: Mood - Altered:  Goal: Mood stable  Description: Mood stable  Outcome: Ongoing  Goal: Absence of abusive behavior  Description: Absence of abusive behavior  Outcome: Ongoing  Goal: Verbalizations of feeling emotionally comfortable while being cared for will increase  Description: Verbalizations of feeling emotionally comfortable while being cared for will increase  Outcome: Ongoing     Problem: Psychomotor Activity - Altered:  Goal: Absence of psychomotor disturbance signs and symptoms  Description: Absence of psychomotor disturbance signs and symptoms  Outcome: Ongoing     Problem: Sensory Perception - Impaired:  Goal: Demonstrations of improved sensory functioning will increase  Description: Demonstrations of improved sensory functioning will increase  Outcome: Ongoing  Goal: Decrease in sensory misperception frequency  Description: Decrease in sensory misperception frequency  Outcome: Ongoing  Goal: Able to refrain from responding to false sensory perceptions  Description: Able to refrain from responding to false sensory perceptions  Outcome: Ongoing  Goal: Demonstrates accurate environmental perceptions  Description: Demonstrates accurate environmental perceptions  Outcome: Ongoing  Goal: Able to distinguish between reality-based and nonreality-based thinking  Description: Able to distinguish between reality-based and nonreality-based thinking  Outcome: Ongoing  Goal: Able to interrupt nonreality-based thinking  Description: Able to interrupt nonreality-based thinking  Outcome: Ongoing     Problem: Sleep Pattern Disturbance:  Goal: Appears well-rested  Description: Appears well-rested  Outcome: Ongoing

## 2020-10-26 NOTE — FLOWSHEET NOTE
Patient states that he has experienced depression since the death of his wife, but assures he is ok now.  had prayer and grief care with patient and will revisit pt for a follow up. Pt agrees with his care and says that the nurses are nice to him.

## 2020-10-26 NOTE — PLAN OF CARE
Problem: Restraint Use - Nonviolent/Non-Self-Destructive Behavior:  Goal: Absence of restraint indications  Description: Absence of restraint indications  Outcome: Ongoing     Problem: Restraint Use - Nonviolent/Non-Self-Destructive Behavior:  Goal: Absence of restraint-related injury  Description: Absence of restraint-related injury  Outcome: Ongoing

## 2020-10-26 NOTE — PROGRESS NOTES
Patient alert and oriented at times, but then will have times of confusion. Patient currently attempting to pull at tele wires despite being in bilateral wrist restraints. Patient states that he wakes up and forgets where he is at. Restraints still in place at this time. Will pass on to oncoming nurse to re-evaluate need.

## 2020-10-26 NOTE — PROGRESS NOTES
Hospitalist Progress Note      Name:  Chris Sexton /Age/Sex: 1970  (44 y.o. male)   MRN & CSN:  [id number] & [id number] Admission Date/Time: 10/24/2020  1:51 PM   Location:  [address] [place] [address] PCP: Jaun Howell MD         Hospital Day: 3    Assessment and Plan:   Chris Sexton is a 44 y.o.  male  who presents with alcohol withdrawal     #Alcohol withdrawal  Last drink 2-3 prior to admission  Denies DTs or seizures during withdrawal  Continue CIWA protocol  Seizure precautions  Patient in Ativan protocol  Phenobarbital 65 mg x 3 added. CM consult for rehab  UDS negative   Restraints and address patient is actively hallucinating and trying to get out of bed while he is unsteady  Monitor closely        #Hypokalemia  #Hypomagnesemia  -repllace as needed  -K replacement protocol     #Transaminitis   (baseline wnl)   (baseline wnl)  Likely related to recent binge-drinking  Holding on CT scan  Check LFTs in AM     #Left upper quadrant abdominal pain  Has acute Right 9th lateral rib fracture  Reports recent fall  No pain unless coughing/sneezing/palpating  Denies n/v/d  As needed pain medication     #Tobacco abuse  Cessation education  Nicotine patch        Chronic  - HTN- Cont bb, lisinopril  - COPD- not in exacerbation; not o2 dependent; cont home regimen  - AOCD- Denies s/sx of bleeding; 2/2 alcohol use; hgb 12; cont PO iron  - Depression- Cont paxil    Diet DIET GENERAL;   DVT Prophylaxis [] Lovenox, []  Heparin, [] SCDs, [] Ambulation   GI Prophylaxis [] PPI,  [] H2 Blocker,  [] Carafate,  [] Diet/Tube Feeds   Code Status Full Code   Disposition Patient requires continued admission due to alcohol withdrawal         History of Present Illness:     Chief Complaint: <principal problem not specified>  Chris Sexton is a 44 y.o.  male  who presents with alcohol withdrawal        Ten point ROS reviewed negative, unless as noted above    Objective:        Intake/Output Summary (Last 24 hours) at 10/26/2020 1557  Last data filed at 10/26/2020 1445  Gross per 24 hour   Intake 250 ml   Output 900 ml   Net -650 ml      Vitals:   Vitals:    10/26/20 1521   BP: (!) 143/89   Pulse: 84   Resp: 16   Temp: 98.7 °F (37.1 °C)   SpO2: 96%     Physical Exam:   GEN Awake male, sitting upright in bed in no apparent distress. Appears given age. EYES Pupils are equally round. No scleral erythema, discharge, or conjunctivitis. HENT Mucous membranes are moist. Oral pharynx without exudates, no evidence of thrush. NECK Supple, no apparent thyromegaly or masses. RESP Clear to auscultation, no wheezes, rales or rhonchi. Symmetric chest movement while on room air. CARDIO/VASC S1/S2 auscultated. Regular rate without appreciable murmurs, rubs, or gallops. No JVD or carotid bruits. Peripheral pulses equal bilaterally and palpable. No peripheral edema. GI Abdomen is soft without significant tenderness, masses, or guarding. Bowel sounds are normoactive. Rectal exam deferred.  No costovertebral angle tenderness. Normal appearing external genitalia. Knox catheter is not present. HEME/LYMPH No palpable cervical lymphadenopathy and no hepatosplenomegaly. No petechiae or ecchymoses. MSK No gross joint deformities. SKIN Normal coloration, warm, dry. NEURO Cranial nerves appear grossly intact, normal speech, no lateralizing weakness. PSYCH Awake, alert, confused, hallucinating.     Medications:   Medications:    amLODIPine  10 mg Oral Daily    PHENobarbital  65 mg Intravenous 3 times per day    potassium chloride  20 mEq Oral TID WC    enoxaparin  40 mg Subcutaneous Daily    sodium chloride flush  10 mL Intravenous 2 times per day    folic acid, thiamine, multi-vitamin with vitamin K infusion   Intravenous Daily    carvedilol  25 mg Oral BID WC    ferrous sulfate  325 mg Oral BID WC    gabapentin  600 mg Oral BID    hydrALAZINE  50 mg Oral 3 times per day    lisinopril  10 mg Oral Daily    nicotine  1 patch Transdermal Daily    tiotropium  2 puff Inhalation Daily    PARoxetine  20 mg Oral Daily      Infusions:    sodium chloride Stopped (10/24/20 2142)     PRN Meds: polyethylene glycol, 17 g, Daily PRN  promethazine, 12.5 mg, Q6H PRN    Or  ondansetron, 4 mg, Q6H PRN  potassium chloride, 40 mEq, PRN    Or  potassium alternative oral replacement, 40 mEq, PRN    Or  potassium chloride, 10 mEq, PRN  sodium chloride flush, 10 mL, PRN  LORazepam, 1 mg, Q1H PRN    Or  LORazepam, 1 mg, Q1H PRN    Or  LORazepam, 2 mg, Q1H PRN    Or  LORazepam, 2 mg, Q1H PRN    Or  LORazepam, 3 mg, Q1H PRN    Or  LORazepam, 3 mg, Q1H PRN    Or  LORazepam, 4 mg, Q1H PRN    Or  LORazepam, 4 mg, Q1H PRN  pantoprazole, 40 mg, Daily PRN          Electronically signed by Yogesh Sweeney MD on 10/26/2020 at 3:57 PM

## 2020-10-26 NOTE — PROGRESS NOTES
Patient continually attempting to get out of bed after fall. Patient medicated with ativan per CIWA scale. Lucinda Jewell NP notified and new orders for restraints received. Patient currently agitated in bed, bilateral wrist restraints in place.  remains in place for patient safety, will cont to monitor.

## 2020-10-26 NOTE — PROGRESS NOTES
patient was attempting to get out of bed, stumbled backwards as charge nurse, Edgardo Arreguin RN, was entering room and landed on buttocks. Patient not complaining of any pain, no injuries noted. STONE placed. VSS. CIWA assessment completed and medicated appropriately. Will cont to monitor.

## 2020-10-26 NOTE — PLAN OF CARE
Problem: Pain:  Description: Pain management should include both nonpharmacologic and pharmacologic interventions.   Goal: Pain level will decrease  Description: Pain level will decrease  Outcome: Ongoing  Goal: Control of acute pain  Description: Control of acute pain  Outcome: Ongoing  Goal: Control of chronic pain  Description: Control of chronic pain  Outcome: Ongoing     Problem: Falls - Risk of:  Goal: Will remain free from falls  Description: Will remain free from falls  Outcome: Ongoing  Goal: Absence of physical injury  Description: Absence of physical injury  Outcome: Ongoing     Problem: Confusion - Acute:  Goal: Absence of continued neurological deterioration signs and symptoms  Description: Absence of continued neurological deterioration signs and symptoms  Outcome: Ongoing  Goal: Mental status will be restored to baseline  Description: Mental status will be restored to baseline  Outcome: Ongoing     Problem: Injury - Risk of, Physical Injury:  Goal: Will remain free from falls  Description: Will remain free from falls  Outcome: Ongoing  Goal: Absence of physical injury  Description: Absence of physical injury  Outcome: Ongoing     Problem: Mood - Altered:  Goal: Mood stable  Description: Mood stable  Outcome: Ongoing  Goal: Absence of abusive behavior  Description: Absence of abusive behavior  Outcome: Ongoing  Goal: Verbalizations of feeling emotionally comfortable while being cared for will increase  Description: Verbalizations of feeling emotionally comfortable while being cared for will increase  Outcome: Ongoing     Problem: Psychomotor Activity - Altered:  Goal: Absence of psychomotor disturbance signs and symptoms  Description: Absence of psychomotor disturbance signs and symptoms  Outcome: Ongoing     Problem: Psychomotor Activity - Altered:  Goal: Absence of psychomotor disturbance signs and symptoms  Description: Absence of psychomotor disturbance signs and symptoms  Outcome: Ongoing well-rested  Description: Appears well-rested  Outcome: Ongoing     Problem: Restraint Use - Nonviolent/Non-Self-Destructive Behavior:  Goal: Absence of restraint indications  Description: Absence of restraint indications  Outcome: Ongoing  Goal: Absence of restraint-related injury  Description: Absence of restraint-related injury  Outcome: Ongoing

## 2020-10-27 NOTE — CARE COORDINATION
Spoke with nursing during morning huddle, pt is out of restraints, still in active withdraw. CM to cont to follow for the appropriate and safe discharge plan.

## 2020-10-27 NOTE — PLAN OF CARE
symptoms  Description: Absence of psychomotor disturbance signs and symptoms  Outcome: Ongoing     Problem: Sensory Perception - Impaired:  Goal: Demonstrations of improved sensory functioning will increase  Description: Demonstrations of improved sensory functioning will increase  Outcome: Ongoing  Goal: Decrease in sensory misperception frequency  Description: Decrease in sensory misperception frequency  Outcome: Ongoing  Goal: Able to refrain from responding to false sensory perceptions  Description: Able to refrain from responding to false sensory perceptions  Outcome: Ongoing  Goal: Demonstrates accurate environmental perceptions  Description: Demonstrates accurate environmental perceptions  Outcome: Ongoing  Goal: Able to distinguish between reality-based and nonreality-based thinking  Description: Able to distinguish between reality-based and nonreality-based thinking  Outcome: Ongoing  Goal: Able to interrupt nonreality-based thinking  Description: Able to interrupt nonreality-based thinking  Outcome: Ongoing     Problem: Sleep Pattern Disturbance:  Goal: Appears well-rested  Description: Appears well-rested  Outcome: Ongoing     Problem: Restraint Use - Nonviolent/Non-Self-Destructive Behavior:  Goal: Absence of restraint indications  Description: Absence of restraint indications  Outcome: Ongoing  Goal: Absence of restraint-related injury  Description: Absence of restraint-related injury  Outcome: Ongoing

## 2020-10-28 NOTE — PROGRESS NOTES
Joana Baer MD, [address]                Internal Medicine Hospitalist             Daily Progress  Note   Subjective:     Chief Complaint   Patient presents with    Altered Mental Status     Mr. Saldaña Complains of nil. RN tells me that he is not requiring any meds on his ciwa. Pain right chest area. Objective:    BP (!) 175/111   Pulse 77   Temp 98.1 °F (36.7 °C) (Oral)   Resp 15   Ht 6' 2\" (1.88 m)   Wt 191 lb 12.8 oz (87 kg)   SpO2 97%   BMI 24.63 kg/m²      Intake/Output Summary (Last 24 hours) at 10/28/2020 1604  Last data filed at 10/28/2020 0947  Gross per 24 hour   Intake 1020 ml   Output 1375 ml   Net -355 ml      Physical Exam:  Heart:  Regular rate and rhythm, normal S1 and S2 in all 4 auscultatory areas. No rubs  Murmurs or gallops heard. Lungs: Mostly clear to auscultation, decreased breath sounds at bases. No wheezes appreciated no crackles heard. Abdomen: Soft, non distended. Bowel sounds appreciated. No obvious liver or spleen enlargement. Non tender, no rebound noted. Extremities: Non tender, no swelling noted, strength 5/5 both legs. CNS: Grossly intact.     Labs:  CBC with Differential:    Lab Results   Component Value Date    WBC 4.9 10/26/2020    RBC 3.27 10/26/2020    HGB 11.1 10/26/2020    HCT 33.5 10/26/2020     10/26/2020    .4 10/26/2020    MCH 33.9 10/26/2020    MCHC 33.1 10/26/2020    RDW 13.6 10/26/2020    SEGSPCT 69.0 10/26/2020    BANDSPCT 6 03/16/2020    LYMPHOPCT 16.7 10/26/2020    MONOPCT 10.2 10/26/2020    EOSPCT 1.6 07/19/2011    BASOPCT 0.8 10/26/2020    MONOSABS 0.5 10/26/2020    LYMPHSABS 0.8 10/26/2020    EOSABS 0.1 10/26/2020    BASOSABS 0.0 10/26/2020    DIFFTYPE AUTOMATED DIFFERENTIAL 10/26/2020     CMP:    Lab Results   Component Value Date     10/26/2020    K 3.4 10/26/2020     10/26/2020    CO2 27 10/26/2020    BUN 11 10/26/2020    CREATININE 0.6 10/26/2020    GFRAA >60 10/26/2020    AGRATIO 2.4 12/31/2019    LABGLOM >60 10/26/2020    GLUCOSE 119 10/26/2020    PROT 5.8 10/25/2020    PROT 6.8 11/20/2012    LABALBU 3.9 10/25/2020    CALCIUM 8.6 10/26/2020    BILITOT 2.5 10/25/2020    ALKPHOS 84 10/25/2020     10/25/2020     10/25/2020     No results for input(s): TROPONINT in the last 72 hours. Lab Results   Component Value Date    TSH 3.570 04/01/2020         sodium chloride 150 mL/hr at 10/28/20 0944      amLODIPine  10 mg Oral Daily    enoxaparin  40 mg Subcutaneous Daily    sodium chloride flush  10 mL Intravenous 2 times per day    carvedilol  25 mg Oral BID WC    ferrous sulfate  325 mg Oral BID WC    gabapentin  600 mg Oral BID    hydrALAZINE  50 mg Oral 3 times per day    lisinopril  10 mg Oral Daily    nicotine  1 patch Transdermal Daily    tiotropium  2 puff Inhalation Daily    PARoxetine  20 mg Oral Daily         Assessment:       Patient Active Problem List    Diagnosis Date Noted    Alcohol withdrawal without perceptual disturbances with complication ([facility] [address] 79/92/5853    Lumbosacral radiculopathy at S1 05/22/2019    Alcohol abuse 05/03/2017    Simple chronic bronchitis ([facility] [address] 12/21/2016    Essential hypertension 12/14/2016    Cramps of lower extremity 55/80/6039    Alcoholic hepatitis without ascites 10/11/2016    Chronic obstructive pulmonary disease ([facility] [address] 10/06/2015    Steatohepatitis 04/27/2015    Abnormal LFTs 04/15/2015    Anxiety 10/09/2014    Panic attack        Plan:     Problems being addressed this admission:   Alcohol abuse / withdrawal  Hypokalemia  Abnormal LFT's   Pain RUQ / right 9th rib fracture  HTN    On ciwa scale and so will need to continue as before. Pain control for his RUQ pain. Recheck labs in am.   Repeat his LFT's in am. Start on Thiamine, multi vit and folic acid. Improve b/p control. General Orders:  Repeat basic labs again in am. Possible dc in am. PT/OT. I have explained to the patient and discussed with him/her the treatment plan.   Donavan Oviedo Ghada Dhillon MD, [address]

## 2020-10-28 NOTE — PLAN OF CARE
Problem: Pain:  Description: Pain management should include both nonpharmacologic and pharmacologic interventions.   Goal: Pain level will decrease  Description: Pain level will decrease  Outcome: Ongoing  Goal: Control of acute pain  Description: Control of acute pain  Outcome: Ongoing  Goal: Control of chronic pain  Description: Control of chronic pain  Outcome: Ongoing     Problem: Falls - Risk of:  Goal: Will remain free from falls  Description: Will remain free from falls  Outcome: Ongoing  Goal: Absence of physical injury  Description: Absence of physical injury  Outcome: Ongoing     Problem: Confusion - Acute:  Goal: Absence of continued neurological deterioration signs and symptoms  Description: Absence of continued neurological deterioration signs and symptoms  Outcome: Ongoing  Goal: Mental status will be restored to baseline  Description: Mental status will be restored to baseline  Outcome: Ongoing     Problem: Discharge Planning:  Goal: Ability to perform activities of daily living will improve  Description: Ability to perform activities of daily living will improve  Outcome: Ongoing  Goal: Participates in care planning  Description: Participates in care planning  Outcome: Ongoing     Problem: Injury - Risk of, Physical Injury:  Goal: Will remain free from falls  Description: Will remain free from falls  Outcome: Ongoing  Goal: Absence of physical injury  Description: Absence of physical injury  Outcome: Ongoing     Problem: Mood - Altered:  Goal: Mood stable  Description: Mood stable  Outcome: Ongoing  Goal: Absence of abusive behavior  Description: Absence of abusive behavior  Outcome: Ongoing  Goal: Verbalizations of feeling emotionally comfortable while being cared for will increase  Description: Verbalizations of feeling emotionally comfortable while being cared for will increase  Outcome: Ongoing     Problem: Psychomotor Activity - Altered:  Goal: Absence of psychomotor disturbance signs and

## 2020-10-29 NOTE — ADT AUTH CERT
Substance-Related Disorders, Adult - Care Day 5 (10/28/2020) by Lindsey Ambrosio RN         Review Status  Review Entered    Completed  10/29/2020 14:43        Criteria Review       Care Day: 5 Care Date: 10/28/2020 Level of Care:    Guideline Day 3    Clinical Status    (X) * No seizure or other severe withdrawal event for at least 24 hours    10/29/2020 2:43 PM EDT by Raysa Lopez      NO SEIZURES    ( ) * Thoughts of suicide or Harm absent or manageable at lower level of care    ( ) * Patient and supports understand follow-up treatment and crisis plan    ( ) * Provider and supports sufficiently available at lower level of care    ( ) * Patient can participate (eg, verify absence of plan for harm) in needed monitoring    ( ) * Functional status impairment absent or adequate self-care support planned at lower level of care    ( ) * Medical comorbidities, adverse medication events, and substance withdrawal manifestations absent or treatable at lower level of care    * Milestone    Additional Notes    10/28       Manish Saldaña Complains of nil. RN tells me that he is not requiring any meds on his [address] right chest area.         Objective:     BP (!) 175/111   Pulse 77   Temp 98.1 °F (36.7 °C) (Oral)   Resp 15   Ht 6' 2\" (1.88 m)   Wt 191 lb 12.8 oz (87 kg)   SpO2 97%   BMI 24.63 kg/m²           Physical Exam:    Heart:  Regular rate and rhythm, normal S1 and S2 in all 4 auscultatory areas. No rubs  Murmurs or gallops heard. Lungs: Mostly clear to auscultation, decreased breath sounds at bases. No wheezes appreciated no crackles heard. Abdomen: Soft, non distended. Bowel sounds appreciated. No obvious liver or spleen enlargement. Non tender, no rebound noted. Extremities: Non tender, no swelling noted, strength 5/5 both legs.     CNS: Grossly intact.         Scheduled Medications    · amLODIPine 10 mg Oral Daily    · enoxaparin 40 mg Subcutaneous Daily    · sodium chloride flush 10 mL Intravenous 2 times per day    · carvedilol 25 mg Oral BID WC    · ferrous sulfate 325 mg Oral BID WC    · gabapentin 600 mg Oral BID    · hydrALAZINE 50 mg Oral 3 times per day    · lisinopril 10 mg Oral Daily    · nicotine 1 patch Transdermal Daily    · tiotropium 2 puff Inhalation Daily    · PARoxetine 20 mg Oral Daily       Plan:         Problems being addressed this admission:    Alcohol abuse / withdrawal    Hypokalemia    Abnormal LFT's    Pain RUQ / right 9th rib fracture    HTN         On ciwa scale and so will need to continue as before. Pain control for his RUQ pain. Recheck labs in am.    Repeat his LFT's in am. Start on Thiamine, multi vit and folic acid. Improve b/p control.         General Orders:    Repeat basic labs again in am. Possible dc in am. PT/OT.        Sodium 137 MMOL/L      Potassium 3.8 MMOL/L      Chloride 98 mMol/L      CO2 28 MMOL/L      Anion Gap 11      BUN 7 MG/DL      CREATININE 0.7 MG/DL      Glucose 112 MG/DL      Calcium 8.9 MG/DL      GFR Non-African American >60 mL/min/1.73m2      GFR African American >60 mL/min/1.73m2                 Substance-Related Disorders, Adult - Care Day 4 (10/27/2020) by Adrian Esparza RN         Review Status  Review Entered    Completed  10/29/2020 14:36        Criteria Review       Care Day: 4 Care Date: 10/27/2020 Level of Care:    Guideline Day 3    Clinical Status    (X) * No seizure or other severe withdrawal event for at least 24 hours    10/29/2020 2:36 PM EDT by Anne Celeste      NO SEIZURES    ( ) * Thoughts of suicide or Harm absent or manageable at lower level of care    ( ) * Patient and supports understand follow-up treatment and crisis plan    ( ) * Provider and supports sufficiently available at lower level of care    ( ) * Patient can participate (eg, verify absence of plan for harm) in needed monitoring    ( ) * Functional status impairment absent or adequate self-care support planned at lower level of care ( ) * Medical comorbidities, adverse medication events, and substance withdrawal manifestations absent or treatable at lower level of care    * Milestone    Additional Notes    10/27       CIWA SCORES 3  2         Patient had restraints removed 2200 and no order renewal. Patient has tolerated with only  tonight.        98.5 (36.9)   18   95   136/86         Scheduled Meds:thiamine, 100 mg, Oral, Daily    therapeutic multivitamin-minerals, 1 tablet, Oral, Daily    folic acid, 1 mg, Oral, Daily    lidocaine, 1 patch, Transdermal, Daily    lisinopril, 20 mg, Oral, Daily    amLODIPine, 10 mg, Oral, Daily    enoxaparin, 40 mg, Subcutaneous, Daily    sodium chloride flush, 10 mL, Intravenous, 2 times per day    carvedilol, 25 mg, Oral, BID WC    ferrous sulfate, 325 mg, Oral, BID WC    gabapentin, 600 mg, Oral, BID    hydrALAZINE, 50 mg, Oral, 3 times per day    nicotine, 1 patch, Transdermal, Daily    tiotropium, 2 puff, Inhalation, Daily    PARoxetine, 20 mg, Oral, Daily

## 2020-10-29 NOTE — PROGRESS NOTES
Physical Therapy  [facility] ACUTE CARE PHYSICAL THERAPY EVALUATION  Rohit Saldaña, 1970, [id number], 10/29/2020    History  [place]:  The primary encounter diagnosis was Alcohol withdrawal syndrome without complication ([facility] [address] Diagnoses of Hypokalemia, Closed fracture of one rib of right side, initial encounter, and Fall, initial encounter were also pertinent to this visit. Patient  has a past medical history of COPD (chronic obstructive pulmonary disease) ([address] History of alcohol abuse, History of idiopathic seizure, Hypertension, and Panic attack. Patient  has a past surgical history that includes Foot surgery and Tonsillectomy. Subjective:  Patient states:  \"I have only been to the bathroom\"  Pain:  6/10 feet and right ribs  Communication with other providers:  Co-eval with OTShellie  Restrictions: general precautions, falls     Home Setup/Prior level of function  Social/Functional History  Additional Comments: Pt is homeless . The homeless shelter reassigned him to stay at a motel to distance from other residents. He has been staying at a motel where he has a tub. He owns no DME. He has historically been INDEP with ADL and mobility. Examination of body systems (includes body structures/functions, activity/participation limitations):  · Observation:  Supine in bed upon arrival. Agreeable to work with therapy   · Vision:  [facility]   · Hearing:  [facility]   · Cardiopulmonary:  Vitals stable on room air     Musculoskeletal  · ROM R/L:  WFL BLEs  · Strength R/L:  BLEs grossly 4+/5  · Neuro:  Hypersensitive feet     Mobility/treatment  · Rolling L/R:  NT   · Supine to sit:  indep with HOB flat and no use of bed rail   · Transfers:   · Sit to stand: SBA from EOB   · Stand to sit: SBA to recliner   · Step pivot: SBA for safety, no AD   · Sitting balance:  indep at EOB   · Standing balance: CGA progressing to SBA   · Gait: ~150ft without AD CGA progressing to SBA.  Short and timid steps due to bilat foot pain. Appears unsteady but with no major LOB noted. Educated pt on RW for stability and decreasing WB to feet but declined use of RW   · Educated pt on POC, role of PT, d/c rec. [facility] 6 Clicks Inpatient Mobility:  AM-PAC Inpatient Mobility Raw Score : 20  Safety: patient left in chair, call light within reach,  gait belt used. Assessment:  Pt is a 44year old male admitted with ETOH withdrawal and a recent falls sustaining right rib fxs. Recommend home once medically stable. He performed well this date with no further acute PT needs. Recommend frequent mobility with nursing and when he returns home to prevent deconditioning. Complexity: low   Prognosis: Good, no significant barriers to participation at this time.    Plan: n/a   Discharge Recommendations: Home   Equipment: could benefit from RW but pt declining     Treatment plan:  Recommendations for NURSING mobility: ambulate 2-3x daily in the hallway if time permits     Time:   Time in: 1102  Time out: 1122  Timed treatment minutes: 8  Total time: 20    Electronically signed by:    Dario Colin, [id number]  10/29/2020, 12:42 PM

## 2020-10-29 NOTE — PROGRESS NOTES
Occupational Therapy  [address] [place] [address] OCCUPATIONAL THERAPY EVALUATION    History  [place]:  The primary encounter diagnosis was Alcohol withdrawal syndrome without complication ([facility] [address] Diagnoses of Hypokalemia, Closed fracture of one rib of right side, initial encounter, and Fall, initial encounter were also pertinent to this visit. Restrictions:                            Communication with other providers: PT    Subjective:  Patient states:  \"I fell, that's why I am in here\"  Pain:  No c/o  Patient goal:  Return to his room at the motel    Occupational profile (relevant social history and personal factors):    Social/Functional History  Additional Comments: Pt is homeless . The homeless shelter reassigned him to stay at a motel to distance from other residents. He has been staying at a motel where he has a tub. He owns no DME. He has historically been INDEP with ADL and mobility. Examination of body systems (includes body structures/functions, activity/participation limitations):  · Orientation: WFL   · Cognition:  WFL   · Observation:  Received pt in bed. Alert and cooperative. · Vision:  glasses  · Hearing:  WFL   · ROM:  WFL BUE  · Strength: WFL BUE  · Sensation: not tested    ADLs  Feeding: INDEP    Grooming: DIANA    Dressing: UB DIANA in seated LB DIANA    Bathing: UB SBA in seated LB SBA    Toileting: SBA    *Some ADL determined per observation of actual ADL performance, functional mobility, balance, activity tolerance, and cognition.      AM-PAC 6 click short form for inpatient daily activity:  Raw Score: 21  24/24 = unimpaired  23/24 = 1-20% impaired   20/24-22/24 = 21-40% impaired  15/24-19/24 = 41-59% impaired   10/24-14/24 = 60%-79% impaired  7/24-9/24 = 80%-99% impaired  6/24 = 100% impaired    Functional Mobility  Bed mobility:   · Supine to sit:  indep with HOB flat and no use of bed rail   · Transfers:   § Sit to stand: SBA from EOB   § Stand to sit: SBA to recliner   § Step pivot: SBA for safety, no AD · Sitting balance:  indep at EOB   · Standing balance: CGA progressing to SBA   · Gait: ~150ft without AD CGA progressing to SBA. Short and timid steps due to bilat foot pain. Appears unsteady but with no major LOB noted. Educated pt on RW for stability and decreasing WB to feet but declined use of RW     Activity tolerance  WFL    Assessment:  Assessment  Treatment Diagnosis: ETOH withdrawal  Prognosis: Good  Decision Making: Low Complexity  REQUIRES OT FOLLOW UP: No(defer to PT)  Discharge Recommendations: Home with assist PRN(plans to d/c back to the [facility] (via the [facility] services))          Goals:  By d/c or goals met:     n/a    Plan:  Plan  Times per week: n/a      Recommendation for activity with nursing staff:  Light steadying as needed due to intermittent unsteadiness. Pt does not wish to use a walker. Treatment today:    Therapeutic Activity Training:   Therapeutic activity training was instructed today. Cues were given for safety, sequence, UE/LE placement, visual cues, and balance. Activities performed today included bed mobility training, sup-sit, sit-stand, walk 200'  Education: Role of OT, OT POC, d/c needs, home safety    Safety: Left in chair with all needs in reach. Gait belt used for transfer and mobility. Time in:  1100  Time out:  1123  Timed treatment minutes:  8  Total treatment time:  23    Electronically signed by:    410Demetra Quinn, OTR/L, [place]   [id number]   1:52 PM, 10/29/2020

## 2020-10-29 NOTE — CARE COORDINATION
CM received a referral from nurse from floor 4 nurse reporting that patient was discharged and needed a ride home. CM requested that nurse bring patient to emergency room and CM will call [employer] @ [phone].

## 2020-10-29 NOTE — PROGRESS NOTES
Jay Pyle MD, [address]   Internal Medicine Hospitalist  Discharge Summary    Paras Foreman  :  1970  MRN:  [id number]    Admit date:  10/24/2020  Discharge date:  10/29/2020    Admitting Physician:  Ciara Guerrero MD    Discharge Diagnoses:    Patient Active Problem List   Diagnosis    Panic attack    Anxiety    Abnormal LFTs    Steatohepatitis    [facility] obstructive [facility] disease [facility] [address] [facility]    Cramps of lower extremity    Alcoholic hepatitis without ascites    Essential hypertension    Simple chronic bronchitis ([facility] [address]    Alcohol abuse    Lumbosacral radiculopathy at S1    Alcohol withdrawal without perceptual disturbances with complication [facility])       Admission Condition:  fair    Discharged Condition:  stable    Hospital Course:     (copied from admission history)  Paras Foreman is a 44 y.o.  male  who presents with the above complaints, onset this morning. Context is, patient reports lengthy alcohol abuse. States he had been sober most of the year, trying to get back into a program for the homeless. Reports binge drinking over 1 day 2-3 days ago. Reports he had visual hallucinations earlier today. Has mild tremors at time of exam.  Reports falling in intoxicated state 2 days ago, landing on Left knee and Rt side with immediate pain to Rt lateral rib area. Denies SOB, n/v/d. States he has no pain unless he cough/sneezes. Denies illicit drug use. Denies hx of seizures. Denies chest pain/pressure, sob, abdominal pain, back pain, n/v, diarrhea. 10/29/20- I saw him today and he seems to be doing [id number], has been ambulatory and eating OK. Has been complaining of some weakness though. Has chest pains right sided due to rib fracture that is improved with lidoderm. I am going to start him on Zithromax to prevent PNA.      Hospital Course:  (copied from last soap) 10/28/20  Alcohol abuse / withdrawal  Hypokalemia resolved  Abnormal LFT's   Pain RUQ / right 9th rib fracture  HTN     On ciwa scale and so will need to continue as before. Pain control for his RUQ pain. Recheck labs in am.   Repeat his LFT's in am. Start on Thiamine, multi vit and folic acid. Improve b/p control. Follow up appointment / plans: Needs to be seen within 7 days by his/her physician, patient to call for an appointment. On examination today  Heart is RRR, Lungs are CTA, abdomen is soft and non tender, CNS is grossly intact. Please see detailed consultation notes and radiology dictations as below. Vitals: Blood pressure 139/88, pulse 79, temperature 98.5 °F (36.9 °C), temperature source Oral, resp. rate 16, height 6' 2\" (1.88 m), weight 196 lb 6.9 oz (89.1 kg), SpO2 95 %. Discharge Medications:        Medication List      START taking these medications    amLODIPine 10 MG tablet  Commonly known as:  NORVASC  Take 1 tablet by mouth daily  Start taking on:  October 30, 2020     azithromycin 500 MG tablet  Commonly known as:  Zithromax  Take 1 tablet by mouth daily for 3 days     lidocaine 4 % external patch  Place 1 patch onto the skin daily  Start taking on:  October 30, 2020     therapeutic multivitamin-minerals tablet  Take 1 tablet by mouth daily  Start taking on:  October 30, 2020        CHANGE how you take these medications    lisinopril 20 MG tablet  Commonly known as:  PRINIVIL;ZESTRIL  Take 1 tablet by mouth daily  Start taking on:  October 30, 2020  What changed:    · medication strength  · how much to take     nicotine 21 MG/24HR  Commonly known as:  [address] [facility] 1 patch onto the skin daily  What changed:  Another medication with the same name was removed. Continue taking this medication, and follow the directions you see here.         CONTINUE taking these medications    albuterol sulfate  (90 Base) MCG/ACT inhaler  Commonly known as:  Proventil HFA  Inhale 2 puffs into the lungs every 6 hours as needed for Wheezing     carvedilol 25 MG tablet  Commonly known as:  COREG  Take 1 tablet by mouth 2 times daily (with meals)     ferrous sulfate 325 (65 Fe) MG tablet  Commonly known as:  IRON 325  Take 1 tablet by mouth 2 times daily (with meals)     folic acid 1 MG tablet  Commonly known as:  FOLVITE  Take 1 tablet by mouth daily     gabapentin 600 MG tablet  Commonly known as:  Neurontin  Take 1 tablet by mouth 2 times daily for 90 doses. Gauze Pads & Dressings 2\"X2\" Pads  1 each by Does not apply route daily     hydrALAZINE 50 MG tablet  Commonly known as:  APRESOLINE  Take 1 tablet by mouth every 8 hours     hydrOXYzine 25 MG tablet  Commonly known as:  ATARAX  Take 1 tablet by mouth 2 times daily as needed for Itching or Anxiety     pantoprazole 40 MG tablet  Commonly known as:  PROTONIX  Take 1 tablet by mouth daily as needed (acid reflux)     PARoxetine 20 MG tablet  Commonly known as:  PAXIL  Take 1 tablet by mouth daily     Spiriva HandiHaler 18 MCG inhalation capsule  Generic drug:  tiotropium  Inhale 1 capsule into the lungs daily     thiamine 100 MG tablet  Take 1 tablet by mouth daily           Where to Get Your Medications      You can get these medications from any pharmacy    Bring a paper prescription for each of these medications  · amLODIPine 10 MG tablet  · azithromycin 500 MG tablet  · lidocaine 4 % external patch  · lisinopril 20 MG tablet  · therapeutic multivitamin-minerals tablet  · thiamine 100 MG tablet         Significant Diagnostic Studies:   Blood work reviewed.    CBC with Differential:    Lab Results   Component Value Date    WBC 4.8 10/29/2020    RBC 3.35 10/29/2020    HGB 11.4 10/29/2020    HCT 34.1 10/29/2020     10/29/2020    .8 10/29/2020    MCH 34.0 10/29/2020    MCHC 33.4 10/29/2020    RDW 13.4 10/29/2020    SEGSPCT 69.0 10/26/2020    BANDSPCT 6 03/16/2020    LYMPHOPCT 16.7 10/26/2020    MONOPCT 10.2 10/26/2020    EOSPCT 1.6 07/19/2011    BASOPCT 0.8 10/26/2020    MONOSABS 0.5 10/26/2020    LYMPHSABS 0.8 10/26/2020    EOSABS 0.1 10/26/2020    BASOSABS 0.0 10/26/2020    DIFFTYPE AUTOMATED DIFFERENTIAL 10/26/2020     CMP:    Lab Results   Component Value Date     10/28/2020    K 3.8 10/28/2020    CL 98 10/28/2020    CO2 28 10/28/2020    BUN 7 10/28/2020    CREATININE 0.7 10/28/2020    GFRAA >60 10/28/2020    AGRATIO 2.4 12/31/2019    LABGLOM >60 10/28/2020    GLUCOSE 112 10/28/2020    PROT 5.5 10/29/2020    PROT 6.8 11/20/2012    LABALBU 3.6 10/29/2020    CALCIUM 8.9 10/28/2020    BILITOT 0.9 10/29/2020    ALKPHOS 69 10/29/2020     10/29/2020     10/29/2020     Procedure  Component  Value  Ref Range  Date/Time       XR CHEST PORTABLE [[id number]]  Collected: 10/28/20 1822      Order Status: Completed  Updated: 10/28/20 1909      Narrative:       EXAMINATION:   ONE XRAY VIEW OF THE CHEST     10/28/2020 4:19 pm     COMPARISON:   Chest radiograph 10/24/2020, 04/08/2020. HISTORY:   ORDERING SYSTEM PROVIDED HISTORY: r/p pna   TECHNOLOGIST PROVIDED HISTORY:   Reason for exam:->r/p pna   Reason for Exam: r/p pna   Acuity: Acute   Type of Exam: Initial     FINDINGS:   Single view provided. The mediastinal, cardiac, and hilar silhouettes are   normal.  Normal lung volumes. In the medial right lung base there is some   subtle bronchial thickening and patchy ground-glass density. No lobar   consolidation. No pneumothorax or free subdiaphragmatic air. Stable   displaced right lateral 9th, 10th, and 11th rib fractures. Impression:       1. Stable appearance of right lateral 9-11 rib fractures. 2. Mild medial right lower lobe bronchopneumonia versus atelectasis. 3. No pneumothorax or effusion.       CT HEAD WO CONTRAST [[id number]]  Collected: 10/24/20 1429      Order Status: Completed  Updated: 10/26/20 0906      Narrative:       EXAMINATION:   CT OF THE HEAD WITHOUT CONTRAST,  10/24/2020 2:03 pm     TECHNIQUE:   CT of the head was performed without the administration of intravenous   contrast. Dose modulation, iterative reconstruction, and/or weight based   adjustment of the mA/kV was utilized to reduce the radiation dose to as low   as reasonably achievable. COMPARISON:   03/16/2020     HISTORY:   ORDERING SYSTEM PROVIDED HISTORY: Fall three days ago, acting confused per   friends. TECHNOLOGIST PROVIDED HISTORY:   Has a \"code stroke\" or \"stroke alert\" been called? ->No   Reason for exam:-> Fall three days ago, acting confused per friends. Reason for Exam: Fall three days ago, acting confused, left-sided head pain. FINDINGS:   BRAIN/VENTRICLES: There is no acute intracranial hemorrhage, mass effect or   midline shift. No abnormal extra-axial fluid collection. The gray-white   differentiation is maintained. There is no evidence of hydrocephalus. ORBITS: The visualized portion of the orbits demonstrate no acute abnormality. SINUSES: The visualized paranasal sinuses and mastoid air cells demonstrate   no acute abnormality. Old left medial orbital wall fracture. SOFT TISSUES/SKULL:  No acute abnormality of the visualized skull or soft   tissues. Impression:       No acute intracranial abnormality. XR CHEST PORTABLE [[id number]]  Collected: 10/24/20 1534      Order Status: Completed  Updated: 10/25/20 1851      Narrative:       EXAMINATION:   ONE XRAY VIEW OF THE CHEST     10/24/2020 2:07 pm     COMPARISON:   04/08/2020     HISTORY:   ORDERING SYSTEM PROVIDED HISTORY: right rib pain after fall three days ago   TECHNOLOGIST PROVIDED HISTORY:   Reason for exam:->right rib pain after fall three days ago   Reason for Exam: right rib pain after fall three days ago   Acuity: Acute   Type of Exam: Initial     FINDINGS:   There is an acute minimally displaced right lateral 9th rib fracture. No   focal consolidation, pleural effusion or pneumothorax. The cardiomediastinal   silhouette is stable. No overt pulmonary edema. The osseous structures are   otherwise stable. Impression:       1.  Acute minimally displaced right lateral 9th rib fracture. 2. No acute cardiopulmonary findings. Disposition:   home  All the above has been explained to patient and or family. Patient would need F/U with his/her PCP in 7 days. Explained that it is the patients responsibility to have blood work or radiology testing done as an out patient. He/She has to take the discharge papers from the hospital for out patient follow up visit with the PCP/Physician. Time spent  >30 minutes.   Signed:  Noni Guevara MD  10/29/2020, 3:36 PM

## 2020-10-29 NOTE — PROGRESS NOTES
CLINICAL PHARMACY NOTE: MEDS TO [address] Select Patient?: No  Total # of Prescriptions Filled: 6   The following medications were delivered to the patient:  · Azithromycin 500mg  · Amlodipine 10mg  · Vitamin B-1 100mg  · Lisinopril 20mg  · Lidocaine 4% patch  · Thera-M multivit  Total # of Interventions Completed: 0  Time Spent (min): 45    Additional Documentation:

## 2021-01-01 ENCOUNTER — TELEPHONE (OUTPATIENT)
Dept: INTERNAL MEDICINE CLINIC | Age: 51
End: 2021-01-01

## 2021-01-05 NOTE — TELEPHONE ENCOUNTER
call- Pt  at home per [id number]([phone]).  They would like to know if  You can sign the certificate

## 2024-01-02 NOTE — TELEPHONE ENCOUNTER
[facility] Transitions Initial Follow Up Call    Outreach made within 2 business days of discharge: Yes    Patient: Rosales Bryan Patient : 1970   MRN: [id number]  Reason for Admission: There are no discharge diagnoses documented for the most recent discharge. Discharge Date: 20       Spoke with: Danette Ricks    Discharge department/facility: [facility] 2020  TCM Interactive Patient Contact:  Was patient able to fill all prescriptions: Yes  Was patient instructed to bring all medications to the follow-up visit: Yes  Is patient taking all medications as directed in the discharge summary?  Yes  Does patient understand their discharge instructions: Yes  Does patient have questions or concerns that need addressed prior to 7-14 day follow up office visit: no    Scheduled appointment with PCP within 7-14 days    Follow Up  Future Appointments   Date Time Provider Warren Santos   2020  9:15 AM Woody Best MD [facility] RAFA[facility]   2020 11:00 AM Woody Best MD [facility] RAFA[place] Tijerina
Jim [facility] Transitions Initial Follow Up Call    Outreach made within 2 business days of discharge: Yes  Patient: Florencia Serrano Patient : 1970   MRN: [id number]  Reason for Admission: There are no discharge diagnoses documented for the most recent discharge. Discharge Date: 20       Spoke with: left on VM to call to schedule appt  Discharge department/facility: [facility]  TCM Interactive Patient Contact:  Was patient able to fill all prescriptions:   Was patient instructed to bring all medications to the follow-up visit:   Is patient taking all medications as directed in the discharge summary?    Does patient understand their discharge instructions:   Does patient have questions or concerns that need addressed prior to 7-14 day follow up office visit:     Scheduled appointment with PCP within 7-14 days    Follow Up  Future Appointments   Date Time Provider Warren Santos   2020 11:00 AM David Dixon MD [facility]  [facility]FA[facility] Dawkins
Ok, I won't be able to do much for him. I still don't have any true details as to why he was in the hospital.  So he can follow up in person when able.
Patient was admitted to [facility] 3/16/2020 and transferred to [facility]. Patient will be discharge on 5/1/2020 into the community.  In office appt or virtual. Please advise
ok
8 days clin sig
No

## 2024-11-05 NOTE — ADT AUTH CERT
Can have her take a one time dose of oral fluconazole which it the treatment regiment for a vaginal yeast infection.   Rx in the chart   Utilization Reviews         Sepsis and Other Febrile Illness, without Focal Infection - Care Day 24 (4/8/2020) by Stephane Moritz, RN         Review Status Review Entered   Completed 4/8/2020 14:59       Criteria Review      Care Day: 24 Care Date: 4/8/2020 Level of Care: Step Down    Guideline Day 4    Level Of Care    (X) Floor to discharge [J]    Clinical Status    (X) * Hemodynamic stability    (X) * Fever absent or acceptable for next level of care    (X) * Hypoxemia absent    (X) * Tachypnea absent    (X) * Cultures negative or infection identified and under adequate treatment    ( ) * Mental status at baseline    (X) * Metabolic derangement (eg, dehydration, acidosis) absent    ( ) * End-organ dysfunction (eg, myocardial ischemia, renal failure) absent    ( ) * Discharge plans and education understood    Activity    ( ) * Ambulatory    Routes    (X) * Oral hydration, medications [K]    (X) Usual diet    Interventions    (X) WBC    Medications    (X) * Antimicrobial treatment not necessary or treatment at next level of care arranged [E]    * Milestone   Additional Notes   4/8  Nephrology note:      - Resting comfortably   - Good uop yesterday   - cr 1.4   - give 40meq po kcl this morning       A&P:   - BETTY from ATN //cr 2.7 on admission from baseline of 0.9 but it ended up peaking at 6//received iv contrast on admission so some NGA is also involved//no HN on imaging studies//UA shows no rbcs and only 1 wbcs on admission   - Hyponatremia: resolved//had developed hypernatremia from NS load and it has resolved   - Anion gap metabolic acidosis   - Acute pancreatitis: lipase of 3000   - transaminitis   - Hx of alcohol abuse: had supposedly quit, serum alcohol was positive//alcohol withdrawal: resolved   - Ileus from pancreatitis: resolved   - HTN    - COPD   - Anxiety   - hypokalemia:   - BIlateral pleural effusions; s/p tap   - acute hypoxic resp failure from pneumonia (X) Usual diet    Interventions    (X) WBC    Medications    (X) * Antimicrobial treatment not necessary or treatment at next level of care arranged [E]    * Milestone   Additional Notes   4/7 Nephrology:      - cr down to 1.5, better   - He is doing ok   - nonoliguric       A&P:   - BETTY from ATN //cr 2.7 on admission from baseline of 0.9 but it ended up peaking at 6//received iv contrast on admission so some NGA is also involved//no HN on imaging studies//UA shows no rbcs and only 1 wbcs on admission   - Hyponatremia: resolved//had developed hypernatremia from NS load and it has resolved   - Anion gap metabolic acidosis   - Acute pancreatitis: lipase of 3000   - transaminitis   - Hx of alcohol abuse: had supposedly quit, serum alcohol was positive//alcohol withdrawal: resolved   - Ileus from pancreatitis: resolved   - HTN    - COPD   - Anxiety   - hypokalemia: resolved   - BIlateral pleural effusions; s/p tap   - acute hypoxic resp failure from pneumonia      =================================================================   4/7  Cardiology note:      [place] alert feeling ok   No chest pain   Diarrhea improved   Heart rate and BP stable   Cr Is improving   Chest pain is non cardiac   Continue medical treatment for now   No PE noted on CT chest   Stable from cardiac stand       BP (!) 167/105   Pulse 98   Temp 98.3 °F (36.8 °C) (Oral)   Resp 28   Ht 6' 2\" (1.88 m)   Wt 184 lb 15.5 oz (83.9 kg)   SpO2 92%   BMI 23.75 kg/m²             Scheduled Medications   · carvedilol 25 mg Oral BID WC   · ferrous sulfate 325 mg Oral BID WC   · Fidaxomicin 200 mg Oral BID   · cefdinir 300 mg Oral 2 times per day   · hydrALAZINE 50 mg Oral 3 times per day   · sodium chloride flush 10 mL Intravenous 2 times per day   · nicotine 1 patch Transdermal Daily   · sodium chloride flush 10 mL Intravenous 2 times per day   · folic acid 1 mg Oral Daily   · thiamine 100 mg Oral Daily   · sodium chloride flush 10 mL Intravenous 2 times per day   · PARoxetine 20 mg Oral Daily   · tiotropium 2 puff Inhalation Daily   · pantoprazole 40 mg Intravenous Daily      ======================================================================   4/7   Pulmonary and Critical Care  Progress Note       VITALS:  BP (!) 167/105   Pulse 98   Temp 98.3 °F (36.8 °C) (Oral)   Resp 28   Ht 6' 2\" (1.88 m)   Wt 184 lb 15.5 oz (83.9 kg)   SpO2 92%   BMI 23.75 kg/m²       PHYSICAL EXAM:     LUNGS:Occasional basal crackles   Abd-soft, BS+,NT   Ext- no pedal edema   CVS-s1s2, no murmurs      Assessment/Plan   BETTY resolved   Hypokalemia   Leukocytosis- improved   Anemia-stable   Coagulopathy- improved   Bilateral pneumonia- improving   Bilateral pleural effusions L > R s/p thoracentesis   Recent pancreatitis   Acute hypoxic resp failure - slowly improving   Lactic acidosis - resolved   H/o Alcohol abuse    severe malnutrition        1. Abx   2. F/u C&S   3. ICs   4. Nebs   5. BMP in am   6. OOB   7. Await placement   8. C/w present management   No follow-ups on file.         =================================================================   4/7  medicine note:      Emily Randle is a 44 y.o. male patient eating breakfast with no abd pain   Blood pressure (!) 155/92, pulse 94, temperature 97.5 °F (36.4 °C), temperature source Oral, resp.  rate 22, height 6' 2\" (1.88 m), weight 184 lb 15.5 oz (83.9 kg), SpO2 93 %.       Subjective:   Symptoms:  Stable.     Diet:  Adequate intake.     Pain:  He reports no pain.        Assessment & Plan   Acute pancareatitis(resolved)   -CT abd with no pseudocyst, RUQ US with no stone   -recommend CT abd outpt   Etoh use with withdrawal(resolved)   PSBO/ileus(resolved)   Acute kidney injury(resolved)   -ACE held, dc neurontin   Hold ACE   PVD with frosbite and mild ischemia   -surg and vasc with  Conservative managemant   -no DVT and art doppler with PVC   Metabolic encpehalopathy(Resolved)   -due to DT/etoh withdrawal   HTN